# Patient Record
Sex: FEMALE | Race: WHITE | Employment: UNEMPLOYED | URBAN - METROPOLITAN AREA
[De-identification: names, ages, dates, MRNs, and addresses within clinical notes are randomized per-mention and may not be internally consistent; named-entity substitution may affect disease eponyms.]

---

## 2017-01-12 RX ORDER — NYSTATIN AND TRIAMCINOLONE ACETONIDE 100000; 1 [USP'U]/G; MG/G
CREAM TOPICAL
Qty: 240 G | Refills: 0 | Status: SHIPPED | OUTPATIENT
Start: 2017-01-12 | End: 2017-02-15 | Stop reason: SDUPTHER

## 2017-01-12 RX ORDER — AMMONIUM LACTATE 12 G/100G
CREAM TOPICAL
Qty: 385 G | Refills: 0 | Status: SHIPPED | OUTPATIENT
Start: 2017-01-12 | End: 2017-02-15 | Stop reason: SDUPTHER

## 2017-02-15 RX ORDER — ALPRAZOLAM 0.25 MG/1
TABLET ORAL
Qty: 90 TAB | Refills: 0 | Status: SHIPPED | OUTPATIENT
Start: 2017-02-15 | End: 2017-05-10 | Stop reason: SDUPTHER

## 2017-02-15 RX ORDER — AMMONIUM LACTATE 12 G/100G
CREAM TOPICAL
Qty: 385 G | Refills: 0 | Status: SHIPPED | OUTPATIENT
Start: 2017-02-15 | End: 2017-03-10 | Stop reason: SDUPTHER

## 2017-02-15 RX ORDER — FUROSEMIDE 20 MG/1
TABLET ORAL
Qty: 90 TAB | Refills: 0 | Status: SHIPPED | OUTPATIENT
Start: 2017-02-15 | End: 2017-05-10 | Stop reason: SDUPTHER

## 2017-02-15 RX ORDER — NYSTATIN AND TRIAMCINOLONE ACETONIDE 100000; 1 [USP'U]/G; MG/G
CREAM TOPICAL
Qty: 240 G | Refills: 0 | Status: SHIPPED | OUTPATIENT
Start: 2017-02-15 | End: 2017-03-10 | Stop reason: SDUPTHER

## 2017-03-12 RX ORDER — NAFTIFINE HYDROCHLORIDE 1 MG/G
CREAM TOPICAL
Qty: 60 G | Refills: 0 | Status: SHIPPED | OUTPATIENT
Start: 2017-03-12 | End: 2017-08-04 | Stop reason: SDUPTHER

## 2017-03-12 RX ORDER — AMMONIUM LACTATE 12 G/100G
CREAM TOPICAL
Qty: 385 G | Refills: 0 | Status: SHIPPED | OUTPATIENT
Start: 2017-03-12 | End: 2017-04-11 | Stop reason: SDUPTHER

## 2017-03-12 RX ORDER — NYSTATIN AND TRIAMCINOLONE ACETONIDE 100000; 1 [USP'U]/G; MG/G
CREAM TOPICAL
Qty: 240 G | Refills: 0 | Status: SHIPPED | OUTPATIENT
Start: 2017-03-12 | End: 2017-04-11 | Stop reason: SDUPTHER

## 2017-03-15 ENCOUNTER — OFFICE VISIT (OUTPATIENT)
Dept: FAMILY MEDICINE CLINIC | Age: 32
End: 2017-03-15

## 2017-03-15 VITALS
HEIGHT: 55 IN | BODY MASS INDEX: 63.64 KG/M2 | TEMPERATURE: 97.4 F | RESPIRATION RATE: 24 BRPM | OXYGEN SATURATION: 98 % | WEIGHT: 275 LBS

## 2017-03-15 DIAGNOSIS — K21.9 GASTROESOPHAGEAL REFLUX DISEASE WITHOUT ESOPHAGITIS: Primary | ICD-10-CM

## 2017-03-15 DIAGNOSIS — Z13.31 SCREENING FOR DEPRESSION: ICD-10-CM

## 2017-03-15 DIAGNOSIS — Q87.11 PRADER-WILLI SYNDROME: ICD-10-CM

## 2017-03-15 DIAGNOSIS — F43.9 STRESS AT HOME: ICD-10-CM

## 2017-03-15 RX ORDER — PANTOPRAZOLE SODIUM 40 MG/1
40 TABLET, DELAYED RELEASE ORAL DAILY
Qty: 90 TAB | Refills: 1 | Status: SHIPPED | OUTPATIENT
Start: 2017-03-15 | End: 2017-06-13 | Stop reason: ALTCHOICE

## 2017-03-15 NOTE — PROGRESS NOTES
Chief Complaint   Patient presents with    Medication Evaluation     rx for pantoprazole    Weight Management     needs a referral for bariatrics     Anxiety     would like for the meds rx'd by Dr. Hines Files but she wants to see if it can be rx'd here. Reviewed record in preparation for visit and have obtained necessary documentation.

## 2017-03-15 NOTE — PROGRESS NOTES
The majority of today's visit was counseling or coordination of care 15 minutes for the following reasons:        See diagnoses and orders, see patient instructions    Body mass index is 63.92 kg/(m^2). I have reviewed/discussed the above normal BMI with the caregiver. I have recommended the following interventions: monitor weight . The plan is as follows: Referral to [address] [place] [address]. .    She is having some stress and behavioral issues. She sees Dr. Cheryl Hernandez for psychiatry but mom feels counseling might be helpful.   [place] [facility]    See instrucitons

## 2017-03-15 NOTE — PATIENT INSTRUCTIONS
See Dr. Amaury Willoughby for weight    Consider counseling with Dr. Cena Kawasaki #[phone] or Dr. Naresh Nur #[phone]    If needed, consider Tish Cline at #[phone] or [phone] after hours    Please call Hayley to help arrange and authorize any tests and/or referrals.   Her # is [phone]

## 2017-04-03 ENCOUNTER — OFFICE VISIT (OUTPATIENT)
Dept: FAMILY MEDICINE CLINIC | Age: 32
End: 2017-04-03

## 2017-04-03 VITALS
HEART RATE: 86 BPM | TEMPERATURE: 97.3 F | RESPIRATION RATE: 18 BRPM | HEIGHT: 55 IN | OXYGEN SATURATION: 96 % | BODY MASS INDEX: 64.34 KG/M2 | WEIGHT: 278 LBS

## 2017-04-03 DIAGNOSIS — Q87.11 PRADER-WILLI SYNDROME: ICD-10-CM

## 2017-04-03 DIAGNOSIS — T14.8XXA NONTRAUMATIC TEAR OF SKIN: Primary | ICD-10-CM

## 2017-04-03 NOTE — MR AVS SNAPSHOT
Visit Information Date & Time Provider Department Dept. Phone Encounter #  
 4/3/2017  5:30 PM Wojciech Washburn MD [address] [facility] [phone] [id number] Your Appointments 5/25/2017 11:00 AM  
Any with Yonas Flynn MD  
[address] ([address]) Appt Note: 1 year CPAP follow up- bring machine_ last seen 05/26/16_ DME: [address] Patient [address] [phone] [address] [facility] [id number] Upcoming Health Maintenance Date Due MICROALBUMIN Q1 4/19/2017 EYE EXAM RETINAL OR DILATED Q1 4/21/2017 HEMOGLOBIN A1C Q6M 6/15/2017* LIPID PANEL Q1 6/15/2017* FOOT EXAM Q1 1/27/2018 DTaP/Tdap/Td series (2 - Td) 9/1/2026 *Topic was postponed. The date shown is not the original due date. Allergies as of 4/3/2017  Review Complete On: 4/3/2017 By: Wojciech Washburn MD  
  
 Severity Noted Reaction Type Reactions Bactrim [Sulfamethoprim Ds]  11/09/2015    Other (comments) Nausea, abdominal pain and cramping Byetta [Exenatide]  10/21/2010    Rash Current Immunizations  Reviewed on 11/9/2015 Name Date Influenza Vaccine 10/20/2015, 10/8/2013 Influenza Vaccine (Quad) PF 9/8/2016 Influenza Vaccine PF 9/14/2012 Pneumococcal Conjugate (PCV-13) 5/5/2015 Pneumococcal Vaccine (Unspecified Type) 2/2/2011 TD Vaccine 12/2/2010 Not reviewed this visit You Were Diagnosed With   
  
 Codes Comments Nontraumatic tear of skin    -  Primary ICD-10-CM: T14.8 ICD-9-CM: 879.8 Prader-Willi syndrome     ICD-10-CM: Q87.1 ICD-9-CM: 759.81 Vitals Pulse Temp Resp Height(growth percentile) Weight(growth percentile) SpO2  
 86 97.3 °F (36.3 °C) (Oral) 18 4' 7\" (1.397 m) 278 lb (126.1 kg) 96% BMI OB Status Smoking Status 64.61 kg/m2 Needs review Never Smoker Vitals History BMI and BSA Data Body Mass Index Body Surface Area 64.61 kg/m 2 2.21 m 2 Preferred Pharmacy Pharmacy Name Phone [facility] [address], [address] [place], [place] [address]y [address]  [facility] [phone] Your Updated Medication List  
  
   
This list is accurate as of: 4/3/17  6:47 PM.  Always use your most recent med list.  
  
  
  
  
 Adhesive Bandage 6 X 8 \" Bndg Commonly known as:  Tendra Mepilex Border 1 Each by Apply Externally route daily. One, T14.8, 2x4cm open area with bloody exudate ALPRAZolam 0.25 mg tablet Commonly known as:  XANAX  
TAKE 1 TABLET BY MOUTH THREE TIMES DAILY AS NEEDED  
  
 ammonium lactate 12 % topical cream  
Commonly known as:  AMLACTIN  
APPLY TOPICALLY TO THE AFFECTED AREA TWICE DAILY AND RUB WELL  
  
 diclofenac 1 % Gel Commonly known as:  VOLTAREN Apply  to affected area four (4) times daily. fenofibrate nanocrystallized 145 mg tablet Commonly known as:  TRICOR  
TAKE 1 TABLET BY MOUTH DAILY  
  
 fexofenadine 180 mg tablet Commonly known as:  Georgana Miller Take 1 Tab by mouth daily. fluticasone 50 mcg/actuation nasal spray Commonly known as:  Patrizia Rad 2 Sprays by Both Nostrils route daily. furosemide 20 mg tablet Commonly known as:  LASIX TAKE 1 TABLET BY MOUTH EVERY DAY  
  
 hydrOXYzine pamoate 25 mg capsule Commonly known as:  VISTARIL  
TAKE 1 CAPSULE BY MOUTH THREE TIMES DAILY AS NEEDED FOR ITCHING  
  
 meloxicam 7.5 mg tablet Commonly known as:  MOBIC Take 1 Tab by mouth daily.  Indications: OSTEOARTHRITIS  
  
 mupirocin calcium 2 % topical cream  
Commonly known as:  BACTROBAN  
APPLY EXTERNALLY TO THE AFFECTED AREA EVERY DAY AS DIRECTED  
  
 naftifine 1 % topical cream  
Commonly known as:  NAFTIN  
APPLY EXTERNALLY TO THE AFFECTED AREA EVERY DAY FOR 2 WEEKS OR AS DIRECTED  
  
 nystatin-triamcinolone topical cream  
Commonly known as:  MYCOLOG II  
 APPLY EXTERNALLY TO THE AFFECTED AREA TWICE DAILY pantoprazole 40 mg tablet Commonly known as:  PROTONIX Take 1 Tab by mouth daily. risperiDONE 1 mg tablet Commonly known as:  RisperDAL TAKE 1 TABLET BY MOUTH TWICE DAILY  
  
 traMADol 50 mg tablet Commonly known as:  ULTRAM  
Take 50 mg by mouth every six (6) hours as needed for Pain. Patient Instructions Use mepilex and wound care as we discussed Introducing [facility]! Leandro Alberto introduces [employer] patient portal. Now you can access parts of your medical record, email your doctor's office, and request medication refills online. 1. In your internet browser, go to [email or web address] 2. Click on the First Time User? Click Here link in the Sign In box. You will see the New Member Sign Up page. 3. Enter your [employer] Access Code exactly as it appears below. You will not need to use this code after youve completed the sign-up process. If you do not sign up before the expiration date, you must request a new code. · [employer] Access Code: [email or web address] Expires: 6/13/2017 12:13 PM 
 
4. Enter the last four digits of your Social Security Number (xxxx) and Date of Birth (mm/dd/yyyy) as indicated and click Submit. You will be taken to the next sign-up page. 5. Create a [employer] ID. This will be your [employer] login ID and cannot be changed, so think of one that is secure and easy to remember. 6. Create a [employer] password. You can change your password at any time. 7. Enter your Password Reset Question and Answer. This can be used at a later time if you forget your password. 8. Enter your e-mail address. You will receive e-mail notification when new information is available in [address] 9. Click Sign Up. You can now view and download portions of your medical record. 10. Click the Download Summary menu link to download a portable copy of your medical information. If you have questions, please visit the Frequently Asked Questions section of the [employer] website. Remember, [employer] is NOT to be used for urgent needs. For medical emergencies, dial 911. Now available from your iPhone and Android! Please provide this summary of care documentation to your next provider. Your primary care clinician is listed as Jessica Olmedo. If you have any questions after today's visit, please call [phone].

## 2017-04-03 NOTE — PROGRESS NOTES
Chief Complaint   Patient presents with    Other     Patient has a skin break down by her right thigh     1. Have you been to the ER, urgent care clinic since your last visit? Hospitalized since your last visit? No    2. Have you seen or consulted any other health care providers outside of the [address] [place] [address] since your last visit? Include any pap smears or colon screening. No     Reviewed record in preparation for visit and have obtained necessary documentation.

## 2017-04-04 ENCOUNTER — TELEPHONE (OUTPATIENT)
Dept: FAMILY MEDICINE CLINIC | Age: 32
End: 2017-04-04

## 2017-04-04 NOTE — TELEPHONE ENCOUNTER
Per call from [facility],    Wound assessment with length,depth and drainage amount needed    Fax to [phone]

## 2017-04-04 NOTE — TELEPHONE ENCOUNTER
Faxed scripted with the following changes, length, width, depth and moderate drainage to [facility]   phone [phone] fax [phone]

## 2017-04-05 ENCOUNTER — TELEPHONE (OUTPATIENT)
Dept: FAMILY MEDICINE CLINIC | Age: 32
End: 2017-04-05

## 2017-04-05 DIAGNOSIS — R23.8 SKIN BREAKDOWN: Primary | ICD-10-CM

## 2017-04-05 NOTE — TELEPHONE ENCOUNTER
[phone], returned your call of the other day from a voice mail. Nurse unavailable and relayed to her the message all was faxed. Said they did NOT receive the fax.

## 2017-04-11 RX ORDER — AMMONIUM LACTATE 12 G/100G
CREAM TOPICAL
Qty: 385 G | Refills: 0 | Status: SHIPPED | OUTPATIENT
Start: 2017-04-11 | End: 2017-05-10 | Stop reason: SDUPTHER

## 2017-04-11 RX ORDER — PANTOPRAZOLE SODIUM 20 MG/1
TABLET, DELAYED RELEASE ORAL
Qty: 60 TAB | Refills: 0 | Status: SHIPPED | OUTPATIENT
Start: 2017-04-11 | End: 2017-05-10 | Stop reason: SDUPTHER

## 2017-04-11 RX ORDER — NYSTATIN AND TRIAMCINOLONE ACETONIDE 100000; 1 [USP'U]/G; MG/G
CREAM TOPICAL
Qty: 240 G | Refills: 0 | Status: SHIPPED | OUTPATIENT
Start: 2017-04-11 | End: 2017-05-10 | Stop reason: SDUPTHER

## 2017-05-11 RX ORDER — PANTOPRAZOLE SODIUM 20 MG/1
TABLET, DELAYED RELEASE ORAL
Qty: 60 TAB | Refills: 0 | Status: SHIPPED | OUTPATIENT
Start: 2017-05-11 | End: 2017-06-08 | Stop reason: SDUPTHER

## 2017-05-11 RX ORDER — FUROSEMIDE 20 MG/1
TABLET ORAL
Qty: 90 TAB | Refills: 0 | Status: SHIPPED | OUTPATIENT
Start: 2017-05-11 | End: 2017-08-04 | Stop reason: SDUPTHER

## 2017-05-11 RX ORDER — ALPRAZOLAM 0.25 MG/1
TABLET ORAL
Qty: 90 TAB | Refills: 0 | Status: SHIPPED | OUTPATIENT
Start: 2017-05-11 | End: 2017-08-09 | Stop reason: SDUPTHER

## 2017-05-11 RX ORDER — NYSTATIN AND TRIAMCINOLONE ACETONIDE 100000; 1 [USP'U]/G; MG/G
CREAM TOPICAL
Qty: 240 G | Refills: 0 | Status: SHIPPED | OUTPATIENT
Start: 2017-05-11 | End: 2017-06-08 | Stop reason: SDUPTHER

## 2017-05-11 RX ORDER — AMMONIUM LACTATE 12 G/100G
CREAM TOPICAL
Qty: 385 G | Refills: 0 | Status: SHIPPED | OUTPATIENT
Start: 2017-05-11 | End: 2017-06-08 | Stop reason: SDUPTHER

## 2017-05-25 ENCOUNTER — OFFICE VISIT (OUTPATIENT)
Dept: SLEEP MEDICINE | Age: 32
End: 2017-05-25

## 2017-05-25 ENCOUNTER — DOCUMENTATION ONLY (OUTPATIENT)
Dept: SLEEP MEDICINE | Age: 32
End: 2017-05-25

## 2017-05-25 VITALS — BODY MASS INDEX: 66.01 KG/M2 | WEIGHT: 284 LBS | TEMPERATURE: 98.1 F

## 2017-05-25 DIAGNOSIS — G47.33 OSA (OBSTRUCTIVE SLEEP APNEA): Primary | ICD-10-CM

## 2017-05-25 DIAGNOSIS — E66.01 OBESITY, MORBID, BMI 50 OR HIGHER (HCC): ICD-10-CM

## 2017-05-25 NOTE — PROGRESS NOTES
[address] [facility]. [place], [address]  Tel.  [phone]  Fax. [address] [facility] [address]  [place], [address] [facility]  Tel.  [phone]  Fax. [phone] [address] Juany Hinds   Tel.  [phone]  Fax. [phone]     S>Roshni Woods is a 28 y.o. female seen for a positive airway pressure follow-up. She reports no problems using the device. The following problems are identified:    Drowsiness no Problems exhaling no   Snoring no Forget to put on no   Mask Comfortable yes Can't fall asleep no   Dry Mouth no Mask falls off no   Air Leaking no Frequent awakenings no         She admits that her sleep has improved. Download AHI 0.4/hr    Allergies   Allergen Reactions    Bactrim [Sulfamethoprim Ds] Other (comments)     Nausea, abdominal pain and cramping      Byetta [Exenatide] Rash       She has a current medication list which includes the following prescription(s): ammonium lactate, alprazolam, furosemide, pantoprazole, nystatin-triamcinolone, pantoprazole, naftifine, risperidone, adhesive bandage, tramadol, meloxicam, diclofenac, mupirocin calcium, hydroxyzine pamoate, fexofenadine, fluticasone, and fenofibrate nanocrystallized. .      She  has a past medical history of Anxiety disorder; Congestive heart failure, unspecified; Diabetes ([address] Hypertension; Lymph edema; Obesity; and Prader - Willi syndrome. [place] Sleepiness Score: 10   and Modified F.O.S.Q. Score Total / 2: 17   which reflect improved sleep quality over therapy time. O>    Visit Vitals    Temp 98.1 °F (36.7 °C)    Wt 284 lb (128.8 kg)    BMI 66.01 kg/m2           General:   Alert, oriented, not in distress   Neck:   No JVD    Chest/Lungs:  symetrical lung expansion , no accessory muscle use    Extremities:  no obvious rashes , negative edema    Neuro:  No focal deficits ;  No obvious tremor    Psych:  Normal affect ,  Normal countenance ;           A>    ICD-10-CM ICD-9-CM 1. CODY (obstructive sleep apnea) G47.33 327.23 AMB SUPPLY ORDER   2. Obesity, morbid, BMI 50 or higher ([facility]) E66.01 278.01      AHI = ?. On CPAP :  8 cmH2O. Compliant:      yes    Therapeutic Response:  Positive    P>    * CPAP is effective and remains necessary treatment for sleep apnea    * Follow-up Disposition:  Return in about 1 year (around 5/25/2018), or if symptoms worsen or fail to improve. * She was asked to contact our office for any problems regarding PAP therapy. * Counseling was provided regarding the importance of regular PAP use and on proper sleep hygiene and safe driving. * Re-enforced proper and regular cleaning for the device. Discussed the patient's above normal BMI with her. I have recommended the following interventions: dietary management education, guidance, and counseling . The BMI follow up plan is as follows: BMI is out of normal parameters and plan is as follows: I have counseled this patient on diet and exercise regimens    Thank you for allowing us to participate in your patient's medical care. Jovanny Llamas M.D.   Diplomate in Sleep Medicine, [facility]

## 2017-05-25 NOTE — MR AVS SNAPSHOT
Visit Information Date & Time Provider Department Dept. Phone Encounter #  
 5/25/2017 11:00 AM Isabel Moseley, [address] [phone] [id number] Follow-up Instructions Return in about 1 year (around 5/25/2018), or if symptoms worsen or fail to improve. Follow-up and Disposition History Upcoming Health Maintenance Date Due MICROALBUMIN Q1 4/19/2017 EYE EXAM RETINAL OR DILATED Q1 4/21/2017 HEMOGLOBIN A1C Q6M 6/15/2017* LIPID PANEL Q1 6/15/2017* INFLUENZA AGE 9 TO ADULT 8/1/2017 FOOT EXAM Q1 3/24/2018 DTaP/Tdap/Td series (2 - Td) 9/1/2026 *Topic was postponed. The date shown is not the original due date. Allergies as of 5/25/2017  Review Complete On: 5/25/2017 By: Isabel Moseley MD  
  
 Severity Noted Reaction Type Reactions Bactrim [Sulfamethoprim Ds]  11/09/2015    Other (comments) Nausea, abdominal pain and cramping Byetta [Exenatide]  10/21/2010    Rash Current Immunizations  Reviewed on 11/9/2015 Name Date Influenza Vaccine 10/20/2015, 10/8/2013 Influenza Vaccine (Quad) PF 9/8/2016 Influenza Vaccine PF 9/14/2012 Pneumococcal Conjugate (PCV-13) 5/5/2015 Pneumococcal Vaccine (Unspecified Type) 2/2/2011 TD Vaccine 12/2/2010 Not reviewed this visit You Were Diagnosed With   
  
 Codes Comments CODY (obstructive sleep apnea)    -  Primary ICD-10-CM: G47.33 
ICD-9-CM: 327.23 Obesity, morbid, BMI 50 or higher (HCC)     ICD-10-CM: E66.01 
ICD-9-CM: 278.01 Vitals Temp Weight(growth percentile) BMI OB Status Smoking Status 98.1 °F (36.7 °C) 284 lb (128.8 kg) 66.01 kg/m2 Needs review Never Smoker BMI and BSA Data Body Mass Index Body Surface Area 66.01 kg/m 2 2.24 m 2 Preferred Pharmacy Pharmacy Name Phone  1080 [facility] [address]IN AT [address] [place] [address] [address] [phone] Your Updated Medication List  
  
   
This list is accurate as of: 5/25/17 12:50 PM.  Always use your most recent med list.  
  
  
  
  
 Adhesive Bandage 6 X 8 \" Bndg Commonly known as:  Tendra Mepilex Border 1 Each by Apply Externally route daily. One, T14.8, 2x4cm open area with bloody exudate ALPRAZolam 0.25 mg tablet Commonly known as:  XANAX  
TAKE 1 TABLET BY MOUTH THREE TIMES DAILY AS NEEDED  
  
 ammonium lactate 12 % topical cream  
Commonly known as:  AMLACTIN  
APPLY TO THE AFFECTED AREA TWICE DAILY AND RUB WELL  
  
 diclofenac 1 % Gel Commonly known as:  VOLTAREN Apply  to affected area four (4) times daily. fenofibrate nanocrystallized 145 mg tablet Commonly known as:  TRICOR  
TAKE 1 TABLET BY MOUTH DAILY  
  
 fexofenadine 180 mg tablet Commonly known as:  Alexa Loop Take 1 Tab by mouth daily. fluticasone 50 mcg/actuation nasal spray Commonly known as:  Ginette Kamlesh 2 Sprays by Both Nostrils route daily. furosemide 20 mg tablet Commonly known as:  LASIX TAKE 1 TABLET BY MOUTH EVERY DAY  
  
 hydrOXYzine pamoate 25 mg capsule Commonly known as:  VISTARIL  
TAKE 1 CAPSULE BY MOUTH THREE TIMES DAILY AS NEEDED FOR ITCHING  
  
 meloxicam 7.5 mg tablet Commonly known as:  MOBIC Take 1 Tab by mouth daily. Indications: OSTEOARTHRITIS  
  
 mupirocin calcium 2 % topical cream  
Commonly known as:  BACTROBAN  
APPLY EXTERNALLY TO THE AFFECTED AREA EVERY DAY AS DIRECTED  
  
 naftifine 1 % topical cream  
Commonly known as:  NAFTIN  
APPLY EXTERNALLY TO THE AFFECTED AREA EVERY DAY FOR 2 WEEKS OR AS DIRECTED  
  
 nystatin-triamcinolone topical cream  
Commonly known as:  MYCOLOG II  
APPLY EXTERNALLY TO THE AFFECTED AREA TWICE DAILY  
  
 * pantoprazole 40 mg tablet Commonly known as:  PROTONIX Take 1 Tab by mouth daily. * pantoprazole 20 mg tablet Commonly known as:  PROTONIX TAKE 1 TABLET BY MOUTH TWICE DAILY  
  
 risperiDONE 1 mg tablet Commonly known as:  RisperDAL TAKE 1 TABLET BY MOUTH TWICE DAILY  
  
 traMADol 50 mg tablet Commonly known as:  ULTRAM  
Take 50 mg by mouth every six (6) hours as needed for Pain. * Notice: This list has 2 medication(s) that are the same as other medications prescribed for you. Read the directions carefully, and ask your doctor or other care provider to review them with you. We Performed the Following AMB SUPPLY ORDER [[id number] Custom] Comments:  
 PAP Mask -patient preference, tubing, filters as needed (all sleep supplies) Length of Need = 99 months Peña Fernando M.D.  (electronically signed) Diplomate in Sleep Medicine, [employer] Follow-up Instructions Return in about 1 year (around 5/25/2018), or if symptoms worsen or fail to improve. Patient Instructions 217 [facility], [place]. 1668 [facility], 1116 [facility] Tel.  [phone] Fax. 100 [facility] [address] [place], 200 [facility] Tel.  [phone] Fax. [phone] [facility] [address], [place] [address] Tel.  [phone] Fax. [phone] Learning About CPAP for Sleep Apnea What is CPAP? CPAP is a small machine that you use at home every night while you sleep. It increases air pressure in your throat to keep your airway open. When you have sleep apnea, this can help you sleep better so you feel much better. CPAP stands for \"continuous positive airway pressure. \" The CPAP machine will have one of the following: · A mask that covers your nose and mouth · Prongs that fit into your nose · A mask that covers your nose only, the most common type. This type is called NCPAP. The N stands for \"nasal.\" Why is it done? CPAP is usually the best treatment for obstructive sleep apnea. It is the first treatment choice and the most widely used. Your doctor may suggest CPAP if you have: · Moderate to severe sleep apnea. · Sleep apnea and coronary artery disease (CAD) or heart failure. How does it help? · CPAP can help you have more normal sleep, so you feel less sleepy and more alert during the daytime. · CPAP may help keep heart failure or other heart problems from getting worse. · NCPAP may help lower your blood pressure. · If you use CPAP, your bed partner may also sleep better because you are not snoring or restless. What are the side effects? Some people who use CPAP have: · A dry or stuffy nose and a sore throat. · Irritated skin on the face. · Sore eyes. · Bloating. If you have any of these problems, work with your doctor to fix them. Here are some things you can try: · Be sure the mask or nasal prongs fit well. · See if your doctor can adjust the pressure of your CPAP. · If your nose is dry, try a humidifier. · If your nose is runny or stuffy, try decongestant medicine or a steroid nasal spray. If these things do not help, you might try a different type of machine. Some machines have air pressure that adjusts on its own. Others have air pressures that are different when you breathe in than when you breathe out. This may reduce discomfort caused by too much pressure in your nose. Where can you learn more? Go to [employer].be Enter J104 in the search box to learn more about \"Learning About CPAP for Sleep Apnea. \"  
© 7760-9945 Healthwise, Incorporated. Care instructions adapted under license by Emmanuel Hernandez (which disclaims liability or warranty for this information). This care instruction is for use with your licensed healthcare professional. If you have questions about a medical condition or this instruction, always ask your healthcare professional. [place] [address] any warranty or liability for your use of this information. Content Version: 9.3.14000; Last Revised: January 11, 2010 PROPER SLEEP HYGIENE 
 
 What to avoid · Do not have drinks with caffeine, such as coffee or black tea, for 8 hours before bed. · Do not smoke or use other types of tobacco near bedtime. Nicotine is a stimulant and can keep you awake. · Avoid drinking alcohol late in the evening, because it can cause you to wake in the middle of the night. · Do not eat a big meal close to bedtime. If you are hungry, eat a light snack. · Do not drink a lot of water close to bedtime, because the need to urinate may wake you up during the night. · Do not read or watch TV in bed. Use the bed only for sleeping and sexual activity. What to try · Go to bed at the same time every night, and wake up at the same time every morning. Do not take naps during the day. · Keep your bedroom quiet, dark, and cool. · Get regular exercise, but not within 3 to 4 hours of your bedtime. Ronen Nassar · Sleep on a comfortable pillow and mattress. · If watching the clock makes you anxious, turn it facing away from you so you cannot see the time. · If you worry when you lie down, start a worry book. Well before bedtime, write down your worries, and then set the book and your concerns aside. · Try meditation or other relaxation techniques before you go to bed. · If you cannot fall asleep, get up and go to another room until you feel sleepy. Do something relaxing. Repeat your bedtime routine before you go to bed again. · Make your house quiet and calm about an hour before bedtime. Turn down the lights, turn off the TV, log off the computer, and turn down the volume on music. This can help you relax after a busy day. Drowsy Driving: The [employer] cites drowsiness as a causing factor in more than 781,889 police reported crashes annually, resulting in 76,000 injuries and 1,500 deaths.  Other surveys suggest 55% of people polled have driven while drowsy in the past year, 23% had fallen asleep but not crashed, 3% crashed, and 2% had and accident due to drowsy driving. Who is at risk? Young Drivers: One study of drowsy driving accidents states that 55% of the drivers were under 25 years. Of those, 75% were male. Shift Workers and Travelers: People who work overnight or travel across time zones frequently are at higher risk of experiencing Circadian Rhythm Disorders. They are trying to work and function when their body is programed to sleep. Sleep Deprived: Lack of sleep has a serious impact on your ability to pay attention or focus on a task. Consistently getting less than the average of 8 hours your body needs creates partial or cumulative sleep deprivation. Untreated Sleep Disorders: Sleep Apnea, Narcolepsy, R.L.S., and other sleep disorders (untreated) prevent a person from getting enough restful sleep. This leads to excessive daytime sleepiness and increases the risk for drowsy driving accidents by up to 7 times. Medications / Alcohol: Even over the counter medications can cause drowsiness. Medications that impair a drivers attention should have a warning label. Alcohol naturally makes you sleepy and on its own can cause accidents. Combined with excessive drowsiness its effects are amplified. Signs of Drowsy Driving: * You don't remember driving the last few miles * You may drift out of your tiffany * You are unable to focus and your thoughts wander * You may yawn more often than normal 
 * You have difficulty keeping your eyes open / nodding off * Missing traffic signs, speeding, or tailgating Prevention-  
Good sleep hygiene, lifestyle and behavioral choices have the most impact on drowsy driving. There is no substitute for sleep and the average person requires 8 hours nightly. If you find yourself driving drowsy, stop and sleep. Consider the sleep hygiene tips provided during your visit as well.   
 
Medication Refill Policy: Refills for all medications require 1 week advance notice. Please have your pharmacy fax a refill request. We are unable to fax, or call in \"controled substance\" medications and you will need to pick these prescriptions up from our office. MyChart Activation Thank you for requesting access to [employer]. Please follow the instructions below to securely access and download your online medical record. [employer] allows you to send messages to your doctor, view your test results, renew your prescriptions, schedule appointments, and more. How Do I Sign Up? 1. In your internet browser, go to [email or web address]. 2. Click on the First Time User? Click Here link in the Sign In box. You will see the New Member Sign Up page. 3. Enter your [employer] Access Code exactly as it appears below. You will not need to use this code after youve completed the sign-up process. If you do not sign up before the expiration date, you must request a new code. [employer] Access Code: [email or web address] Expires: 2017 12:13 PM (This is the date your [employer] access code will ) 4. Enter the last four digits of your Social Security Number (xxxx) and Date of Birth (mm/dd/yyyy) as indicated and click Submit. You will be taken to the next sign-up page. 5. Create a [employer] ID. This will be your [employer] login ID and cannot be changed, so think of one that is secure and easy to remember. 6. Create a [employer] password. You can change your password at any time. 7. Enter your Password Reset Question and Answer. This can be used at a later time if you forget your password. 8. Enter your e-mail address. You will receive e-mail notification when new information is available in [address] 9. Click Sign Up. You can now view and download portions of your medical record. 10. Click the Download Summary menu link to download a portable copy of your medical information. Additional Information If you have questions, please call [phone]. Remember, MyChart is NOT to be used for urgent needs. For medical emergencies, dial 911. Starting a Weight Loss Plan: After Your Visit Your Care Instructions If you are thinking about losing weight, it can be hard to know where to start. Your doctor can help you set up a weight loss plan that best meets your needs. You may want to take a class on nutrition or exercise, or join a weight loss support group. If you have questions about how to make changes to your eating or exercise habits, ask your doctor about seeing a registered dietitian or an exercise specialist. 
It can be a big challenge to lose weight. But you do not have to make huge changes at once. Make small changes, and stick with them. When those changes become habit, add a few more changes. If you do not think you are ready to make changes right now, try to pick a date in the future. Make an appointment to see your doctor to discuss whether the time is right for you to start a plan. Follow-up care is a key part of your treatment and safety. Be sure to make and go to all appointments, and call your doctor if you are having problems. It's also a good idea to know your test results and keep a list of the medicines you take. How can you care for yourself at home? · Set realistic goals. Many people expect to lose much more weight than is likely. A weight loss of 5% to 10% of your body weight may be enough to improve your health. · Get family and friends involved to provide support. Talk to them about why you are trying to lose weight, and ask them to help. They can help by participating in exercise and having meals with you, even if they may be eating something different. · Find what works best for you. If you do not have time or do not like to cook, a program that offers meal replacement bars or shakes may be better for you.  Or if you like to prepare meals, finding a plan that includes daily menus and recipes may be best. 
· Ask your doctor about other health professionals who can help you achieve your weight loss goals. ¨ A dietitian can help you make healthy changes in your diet. ¨ An exercise specialist or  can help you develop a safe and effective exercise program. 
¨ A counselor or psychiatrist can help you cope with issues such as depression, anxiety, or family problems that can make it hard to focus on weight loss. · Consider joining a support group for people who are trying to lose weight. Your doctor can suggest groups in your area. Where can you learn more? Go to "[employer].be Enter F142 in the search box to learn more about \"Starting a Weight Loss Plan: After Your Visit. \"  
© 5937-5488 Healthwise, Incorporated. Care instructions adapted under license by [employer] (which disclaims liability or warranty for this information). This care instruction is for use with your licensed healthcare professional. If you have questions about a medical condition or this instruction, always ask your healthcare professional. [place] [address] any warranty or liability for your use of this information. Content Version: 6.3.57140; Last Revised: September 1, 2009 Introducing \[facility]\"" & HEALTH SERVICES! [employer] introduces [employer] patient portal. Now you can access parts of your medical record, email your doctor's office, and request medication refills online. 1. In your internet browser, go to [email or web address] 2. Click on the First Time User? Click Here link in the Sign In box. You will see the New Member Sign Up page. 3. Enter your [employer] Access Code exactly as it appears below. You will not need to use this code after youve completed the sign-up process. If you do not sign up before the expiration date, you must request a new code. · [employer] Access Code: [email or web address] Expires: 6/13/2017 12:13 PM 
 
4. Enter the last four digits of your Social Security Number (xxxx) and Date of Birth (mm/dd/yyyy) as indicated and click Submit. You will be taken to the next sign-up page. 5. Create a [employer] ID. This will be your [employer] login ID and cannot be changed, so think of one that is secure and easy to remember. 6. Create a [employer] password. You can change your password at any time. 7. Enter your Password Reset Question and Answer. This can be used at a later time if you forget your password. 8. Enter your e-mail address. You will receive e-mail notification when new information is available in [address] 9. Click Sign Up. You can now view and download portions of your medical record. 10. Click the Download Summary menu link to download a portable copy of your medical information. If you have questions, please visit the Frequently Asked Questions section of the [employer] website. Remember, [employer] is NOT to be used for urgent needs. For medical emergencies, dial 911. Now available from your iPhone and Android! Please provide this summary of care documentation to your next provider. Your primary care clinician is listed as Darío Peters. If you have any questions after today's visit, please call [phone].

## 2017-05-25 NOTE — PATIENT INSTRUCTIONS
217 [facility] [place]. [place], [address]  Tel.  [phone]  Fax. 100 [facility] 60  [place], [address]  Tel.  [phone]  Fax. [phone] [address] [address] Juany Hinds  Tel.  [phone]  Fax. [phone]     Learning About CPAP for Sleep Apnea  What is CPAP? CPAP is a small machine that you use at home every night while you sleep. It increases air pressure in your throat to keep your airway open. When you have sleep apnea, this can help you sleep better so you feel much better. CPAP stands for \"continuous positive airway pressure. \"  The CPAP machine will have one of the following:  · A mask that covers your nose and mouth  · Prongs that fit into your nose  · A mask that covers your nose only, the most common type. This type is called NCPAP. The N stands for \"nasal.\"  Why is it done? CPAP is usually the best treatment for obstructive sleep apnea. It is the first treatment choice and the most widely used. Your doctor may suggest CPAP if you have:  · Moderate to severe sleep apnea. · Sleep apnea and coronary artery disease (CAD) or heart failure. How does it help? · CPAP can help you have more normal sleep, so you feel less sleepy and more alert during the daytime. · CPAP may help keep heart failure or other heart problems from getting worse. · NCPAP may help lower your blood pressure. · If you use CPAP, your bed partner may also sleep better because you are not snoring or restless. What are the side effects? Some people who use CPAP have:  · A dry or stuffy nose and a sore throat. · Irritated skin on the face. · Sore eyes. · Bloating. If you have any of these problems, work with your doctor to fix them. Here are some things you can try:  · Be sure the mask or nasal prongs fit well. · See if your doctor can adjust the pressure of your CPAP. · If your nose is dry, try a humidifier.   · If your nose is runny or stuffy, try decongestant medicine or a steroid nasal spray. If these things do not help, you might try a different type of machine. Some machines have air pressure that adjusts on its own. Others have air pressures that are different when you breathe in than when you breathe out. This may reduce discomfort caused by too much pressure in your nose. Where can you learn more? Go to Cono-C.be  Enter Richmondstarla Basilio in the search box to learn more about \"Learning About CPAP for Sleep Apnea. \"   © 0335-7938 Healthwise, Incorporated. Care instructions adapted under license by Claudia Ricci (which disclaims liability or warranty for this information). This care instruction is for use with your licensed healthcare professional. If you have questions about a medical condition or this instruction, always ask your healthcare professional. [address] any warranty or liability for your use of this information. Content Version: 8.4.69337; Last Revised: January 11, 2010  PROPER SLEEP HYGIENE    What to avoid  · Do not have drinks with caffeine, such as coffee or black tea, for 8 hours before bed. · Do not smoke or use other types of tobacco near bedtime. Nicotine is a stimulant and can keep you awake. · Avoid drinking alcohol late in the evening, because it can cause you to wake in the middle of the night. · Do not eat a big meal close to bedtime. If you are hungry, eat a light snack. · Do not drink a lot of water close to bedtime, because the need to urinate may wake you up during the night. · Do not read or watch TV in bed. Use the bed only for sleeping and sexual activity. What to try  · Go to bed at the same time every night, and wake up at the same time every morning. Do not take naps during the day. · Keep your bedroom quiet, dark, and cool. · Get regular exercise, but not within 3 to 4 hours of your bedtime. .  · Sleep on a comfortable pillow and mattress.   · If watching the clock makes you anxious, turn it facing away from you so you cannot see the time. · If you worry when you lie down, start a worry book. Well before bedtime, write down your worries, and then set the book and your concerns aside. · Try meditation or other relaxation techniques before you go to bed. · If you cannot fall asleep, get up and go to another room until you feel sleepy. Do something relaxing. Repeat your bedtime routine before you go to bed again. · Make your house quiet and calm about an hour before bedtime. Turn down the lights, turn off the TV, log off the computer, and turn down the volume on music. This can help you relax after a busy day. Drowsy Driving: The [facility] cites drowsiness as a causing factor in more than 965,273 police reported crashes annually, resulting in 76,000 injuries and 1,500 deaths. Other surveys suggest 55% of people polled have driven while drowsy in the past year, 23% had fallen asleep but not crashed, 3% crashed, and 2% had and accident due to drowsy driving. Who is at risk? Young Drivers: One study of drowsy driving accidents states that 55% of the drivers were under 25 years. Of those, 75% were male. Shift Workers and Travelers: People who work overnight or travel across time zones frequently are at higher risk of experiencing Circadian Rhythm Disorders. They are trying to work and function when their body is programed to sleep. Sleep Deprived: Lack of sleep has a serious impact on your ability to pay attention or focus on a task. Consistently getting less than the average of 8 hours your body needs creates partial or cumulative sleep deprivation. Untreated Sleep Disorders: Sleep Apnea, Narcolepsy, R.L.S., and other sleep disorders (untreated) prevent a person from getting enough restful sleep. This leads to excessive daytime sleepiness and increases the risk for drowsy driving accidents by up to 7 times.   Medications / Alcohol: Even over the counter medications can cause drowsiness. Medications that impair a drivers attention should have a warning label. Alcohol naturally makes you sleepy and on its own can cause accidents. Combined with excessive drowsiness its effects are amplified. Signs of Drowsy Driving:   * You don't remember driving the last few miles   * You may drift out of your tiffany   * You are unable to focus and your thoughts wander   * You may yawn more often than normal   * You have difficulty keeping your eyes open / nodding off   * Missing traffic signs, speeding, or tailgating  Prevention-   Good sleep hygiene, lifestyle and behavioral choices have the most impact on drowsy driving. There is no substitute for sleep and the average person requires 8 hours nightly. If you find yourself driving drowsy, stop and sleep. Consider the sleep hygiene tips provided during your visit as well. Medication Refill Policy: Refills for all medications require 1 week advance notice. Please have your pharmacy fax a refill request. We are unable to fax, or call in \"controled substance\" medications and you will need to pick these prescriptions up from our office. [facility] Activation    Thank you for requesting access to [facility]. Please follow the instructions below to securely access and download your online medical record. [employer] allows you to send messages to your doctor, view your test results, renew your prescriptions, schedule appointments, and more. How Do I Sign Up? 1. In your internet browser, go to [email or web address]. 2. Click on the First Time User? Click Here link in the Sign In box. You will see the New Member Sign Up page. 3. Enter your [employer] Access Code exactly as it appears below. You will not need to use this code after youve completed the sign-up process. If you do not sign up before the expiration date, you must request a new code.     [facility] Access Code: [email or web address]  Expires: 2017 12:13 PM (This is the date your Yunno access code will )    4. Enter the last four digits of your Social Security Number (xxxx) and Date of Birth (mm/dd/yyyy) as indicated and click Submit. You will be taken to the next sign-up page. 5. Create a Yunno ID. This will be your Yunno login ID and cannot be changed, so think of one that is secure and easy to remember. 6. Create a Yunno password. You can change your password at any time. 7. Enter your Password Reset Question and Answer. This can be used at a later time if you forget your password. 8. Enter your e-mail address. You will receive e-mail notification when new information is available in [address] 9. Click Sign Up. You can now view and download portions of your medical record. 10. Click the Download Summary menu link to download a portable copy of your medical information. Additional Information    If you have questions, please call [phone]. Remember, Yunno is NOT to be used for urgent needs. For medical emergencies, dial 911. Starting a Weight Loss Plan: After Your Visit  Your Care Instructions  If you are thinking about losing weight, it can be hard to know where to start. Your doctor can help you set up a weight loss plan that best meets your needs. You may want to take a class on nutrition or exercise, or join a weight loss support group. If you have questions about how to make changes to your eating or exercise habits, ask your doctor about seeing a registered dietitian or an exercise specialist.  It can be a big challenge to lose weight. But you do not have to make huge changes at once. Make small changes, and stick with them. When those changes become habit, add a few more changes. If you do not think you are ready to make changes right now, try to pick a date in the future.  Make an appointment to see your doctor to discuss whether the time is right for you to start a plan.  Follow-up care is a key part of your treatment and safety. Be sure to make and go to all appointments, and call your doctor if you are having problems. It's also a good idea to know your test results and keep a list of the medicines you take. How can you care for yourself at home? · Set realistic goals. Many people expect to lose much more weight than is likely. A weight loss of 5% to 10% of your body weight may be enough to improve your health. · Get family and friends involved to provide support. Talk to them about why you are trying to lose weight, and ask them to help. They can help by participating in exercise and having meals with you, even if they may be eating something different. · Find what works best for you. If you do not have time or do not like to cook, a program that offers meal replacement bars or shakes may be better for you. Or if you like to prepare meals, finding a plan that includes daily menus and recipes may be best.  · Ask your doctor about other health professionals who can help you achieve your weight loss goals. ¨ A dietitian can help you make healthy changes in your diet. ¨ An exercise specialist or  can help you develop a safe and effective exercise program.  ¨ A counselor or psychiatrist can help you cope with issues such as depression, anxiety, or family problems that can make it hard to focus on weight loss. · Consider joining a support group for people who are trying to lose weight. Your doctor can suggest groups in your area. Where can you learn more? Go to Cycell.be  Enter U357 in the search box to learn more about \"Starting a Weight Loss Plan: After Your Visit. \"   © 7594-2751 Healthwise, Incorporated. Care instructions adapted under license by [facility] (which disclaims liability or warranty for this information).  This care instruction is for use with your licensed healthcare professional. If you have questions about a medical condition or this instruction, always ask your healthcare professional. [address] any warranty or liability for your use of this information.   Content Version: 1.3.00385; Last Revised: September 1, 2009

## 2017-06-11 RX ORDER — AMMONIUM LACTATE 12 G/100G
CREAM TOPICAL
Qty: 385 G | Refills: 0 | Status: SHIPPED | OUTPATIENT
Start: 2017-06-11 | End: 2017-07-09 | Stop reason: SDUPTHER

## 2017-06-11 RX ORDER — PANTOPRAZOLE SODIUM 20 MG/1
TABLET, DELAYED RELEASE ORAL
Qty: 60 TAB | Refills: 0 | Status: SHIPPED | OUTPATIENT
Start: 2017-06-11 | End: 2017-07-09 | Stop reason: SDUPTHER

## 2017-06-11 RX ORDER — NYSTATIN AND TRIAMCINOLONE ACETONIDE 100000; 1 [USP'U]/G; MG/G
CREAM TOPICAL
Qty: 240 G | Refills: 0 | Status: SHIPPED | OUTPATIENT
Start: 2017-06-11 | End: 2017-07-09 | Stop reason: SDUPTHER

## 2017-06-12 ENCOUNTER — TELEPHONE (OUTPATIENT)
Dept: FAMILY MEDICINE CLINIC | Age: 32
End: 2017-06-12

## 2017-06-12 NOTE — TELEPHONE ENCOUNTER
Veronica Johnston (on HIPAA) called wanting to speak with nurse regarding paperwork that should have been sent to the office

## 2017-06-13 ENCOUNTER — OFFICE VISIT (OUTPATIENT)
Dept: FAMILY MEDICINE CLINIC | Age: 32
End: 2017-06-13

## 2017-06-13 VITALS
TEMPERATURE: 97.7 F | OXYGEN SATURATION: 96 % | HEIGHT: 55 IN | BODY MASS INDEX: 66.65 KG/M2 | WEIGHT: 288 LBS | RESPIRATION RATE: 18 BRPM | HEART RATE: 97 BPM

## 2017-06-13 DIAGNOSIS — Z13.31 SCREENING FOR DEPRESSION: ICD-10-CM

## 2017-06-13 DIAGNOSIS — I10 ESSENTIAL HYPERTENSION: ICD-10-CM

## 2017-06-13 DIAGNOSIS — E55.9 UNSPECIFIED VITAMIN D DEFICIENCY: ICD-10-CM

## 2017-06-13 DIAGNOSIS — F71 MR (MENTAL RETARDATION), MODERATE: ICD-10-CM

## 2017-06-13 DIAGNOSIS — E07.9 THYROID CONDITION: ICD-10-CM

## 2017-06-13 DIAGNOSIS — Q87.11 PRADER-WILLI SYNDROME: ICD-10-CM

## 2017-06-13 DIAGNOSIS — K21.9 GASTROESOPHAGEAL REFLUX DISEASE WITHOUT ESOPHAGITIS: ICD-10-CM

## 2017-06-13 DIAGNOSIS — Z00.00 HEALTHCARE MAINTENANCE: Primary | ICD-10-CM

## 2017-06-13 DIAGNOSIS — E78.5 OTHER AND UNSPECIFIED HYPERLIPIDEMIA: ICD-10-CM

## 2017-06-13 DIAGNOSIS — E11.65 TYPE 2 DIABETES MELLITUS WITH HYPERGLYCEMIA, UNSPECIFIED LONG TERM INSULIN USE STATUS: ICD-10-CM

## 2017-06-13 DIAGNOSIS — E53.8 B12 DEFICIENCY: ICD-10-CM

## 2017-06-13 LAB
ALBUMIN UR QL STRIP: 10 MG/L
CREATININE, URINE POC: 10 MG/DL
MICROALBUMIN/CREAT RATIO POC: <30 MG/G

## 2017-06-13 NOTE — PROGRESS NOTES
Chief Complaint   Patient presents with   Sri Lorna Annual Wellness Visit     1. Have you been to the ER, urgent care clinic since your last visit? Hospitalized since your last visit? No    2. Have you seen or consulted any other health care providers outside of the [facility] since your last visit? Include any pap smears or colon screening. No     Reviewed record in preparation for visit and have obtained necessary documentation.

## 2017-06-13 NOTE — LETTER
6/26/2017 4:17 PM 
 
Ms. Aditya Yost [address] [facility] [address] Dear Aditya Yost: 
 
Please find your most recent results below. Resulted Orders VITAMIN D, 25 HYDROXY Result Value Ref Range VITAMIN D, 25-HYDROXY CANCELED ng/mL Comment:  
   [facility] was unable to collect sufficient specimen to perform the 
following test(s), and is providing the patient with re-collection 
instructions. Vitamin D deficiency has been defined by the [address] [place] [address] practice guideline as a 
level of serum 25-OH vitamin D less than 20 ng/mL (1,2). The Endocrine Society went on to further define vitamin D 
insufficiency as a level between 21 and 29 ng/mL (2). 1. IOM ([place] of Medicine). 2010. Dietary reference 
   intakes for calcium and D. [address] [facility]: The 
   Pixtr. 2. Russel MF, Levi DELGADO, Graeme HO, et al. 
   Evaluation, treatment, and prevention of vitamin D 
   deficiency: an Endocrine Society clinical practice 
   guideline. JCEM. 2011 Jul; 96(7):1911-30. Result canceled by the ancillary Narrative Performed at:  [address]  [id number] : Niki Senior MD, Phone:  [phone] [facility] AND [facility] Result Value Ref Range Luteinizing hormone <0.2 mIU/mL Comment:  
                       Adult Female: Follicular phase      2.4 -  12.6 Ovulation phase      14.0 -  95.6 Luteal phase          1.0 -  11.4 Postmenopausal        7.7 -  58.5 FSH <0.2 mIU/mL Comment:  
                       Adult Female: Follicular phase      3.5 -  12.5 Ovulation phase       4.7 -  21.5 Luteal phase          1.7 -   7.7 Postmenopausal       25.8 - 134.8 Narrative Performed at:  [address]  [id number] : Zora Heredia MD, Phone:  [phone] ESTRADIOL Result Value Ref Range Estradiol 21.1 pg/mL Comment:  
                       Adult Female: Follicular phase   22.9 -   166.0 Ovulation phase    85.8 -   498.0 Luteal phase       43.8 -   211.0 Postmenopausal     <6.0 -    54.7 Pregnancy 1st trimester     215.0 - >4300.0 Girls (1-10 years)    6.0 -    27.0 Roche ECLIA methodology Narrative Performed at:  [address]  [id number] : Zora Heredia MD, Phone:  [phone] PROLACTIN Result Value Ref Range Prolactin 26.1 (H) 4.8 - 23.3 ng/mL Narrative Performed at:  [address]  [id number] : Zora Heredia MD, Phone:  [phone] HEMOGLOBIN A1C WITH EAG Result Value Ref Range Hemoglobin A1c 8.6 (H) 4.8 - 5.6 % Comment:  
            Pre-diabetes: 5.7 - 6.4 Diabetes: >6.4 Glycemic control for adults with diabetes: <7.0 Estimated average glucose 200 mg/dL Narrative Performed at:  [address]  [id number] : Zora Heredia MD, Phone:  [phone] VITAMIN B12 Result Value Ref Range Vitamin B12 593 211 - 946 pg/mL Narrative Performed at:  [address]  [id number] : Zora Heredia MD, Phone:  [phone] THYROID PANEL Result Value Ref Range T4, Total CANCELED ug/dL Comment:  
   [facility] to perform the 
following test(s), and is providing the patient with re-collection 
instructions. Result canceled by the ancillary T3 Uptake CANCELED Comment:  
   Test not performed Result canceled by the ancillary Narrative Performed at:  [address]  [id number] : Finn Patricio MD, Phone:  [phone] AMB POC URINE, MICROALBUMIN, SEMIQUANT (3 RESULTS) Result Value Ref Range ALBUMIN, URINE POC 10 Negative mg/L  
 CREATININE, URINE POC 10 mg/dL Microalbumin/creat ratio (POC) <30 MG/G  
CHOLESTEROL, HDL Result Value Ref Range HDL Cholesterol CANCELED mg/dL Comment:  
   [facility] was unable to collect sufficient specimen to perform the 
following test(s), and is providing the patient with re-collection 
instructions. Result canceled by the ancillary Narrative Performed at:  [address]  [id number] : Finn Patricio MD, Phone:  [phone] CHOLESTEROL, TOTAL Result Value Ref Range Cholesterol, total CANCELED mg/dL Comment:  
   [facility] was unable to collect sufficient specimen to perform the 
following test(s), and is providing the patient with re-collection 
instructions. Result canceled by the ancillary Narrative Performed at:  [address]  [id number] : Finn Patricio MD, Phone:  [phone] LDL, DIRECT Result Value Ref Range LDL,Direct 107 (H) 0 - 99 mg/dL Narrative Performed at:  [address]  [id number] : Finn Patricio MD, Phone:  [phone] METABOLIC PANEL, BASIC Result Value Ref Range Glucose CANCELED mg/dL Comment:  
   [facility] was unable to collect sufficient specimen to perform the 
following test(s), and is providing the patient with re-collection 
instructions. Result canceled by the ancillary BUN CANCELED Comment:  
   Test not performed Result canceled by the ancillary Creatinine CANCELED Comment:  
   Test not performed Result canceled by the ancillary Sodium CANCELED Comment:  
   Test not performed Result canceled by the ancillary Potassium CANCELED Comment:  
   Test not performed Result canceled by the ancillary Chloride CANCELED Comment:  
   Test not performed Result canceled by the ancillary CO2 CANCELED Comment:  
   Test not performed Result canceled by the ancillary Calcium CANCELED Comment:  
   Test not performed Result canceled by the ancillary Narrative Performed at:  [address]  [id number] : Ryanne Álvarez MD, Phone:  [phone] ALT Result Value Ref Range ALT (SGPT) CANCELED IU/L Comment:  
   [facility] sufficient specimen to perform the 
following test(s), and is providing the patient with re-collection 
instructions. Result canceled by the ancillary Narrative Performed at:  [address]  [id number] : Ryanne Álvarez MD, Phone:  [phone] HGB & HCT Result Value Ref Range HGB 12.8 11.1 - 15.9 g/dL HCT 39.3 34.0 - 46.6 % Narrative Performed at:  [address]  [id number] : Ryanne Álvarez MD, Phone:  [phone] CVD REPORT Result Value Ref Range INTERPRETATION Note Comment:  
   Medical Director's Note: A CV Risk Assessment Report was not 
sent because both LDL cholesterol and non-HDL cholesterol 
results are required to generate a report. Supplement report is available. Narrative Performed at:  [address] [place] [address] [address] [place] [address]  [id number] : Yamilet Acosta PhD, Phone:  [phone] DIABETES PATIENT EDUCATION Result Value Ref Range PDF Image Not applicable Narrative Performed at:  [address] [place] [address]  [id number] : Yvonne Gibson PhD, Phone:  [phone] Your labs are back but some of them had to be cancelled. Please show this copy to your specialist to see if anything needs to be redone. Sincerely, Rachael Avalos MD

## 2017-06-13 NOTE — MR AVS SNAPSHOT
Visit Information Date & Time Provider Department Dept. Phone Encounter #  
 6/13/2017 11:00 AM Milad Martinez MD [address] [place] [address] [phone] [id number] Upcoming Health Maintenance Date Due MICROALBUMIN Q1 4/19/2017 EYE EXAM RETINAL OR DILATED Q1 4/21/2017 HEMOGLOBIN A1C Q6M 6/15/2017* LIPID PANEL Q1 6/15/2017* INFLUENZA AGE 9 TO ADULT 8/1/2017 FOOT EXAM Q1 3/24/2018 DTaP/Tdap/Td series (2 - Td) 9/1/2026 *Topic was postponed. The date shown is not the original due date. Allergies as of 6/13/2017  Review Complete On: 6/13/2017 By: Gray Brooke LPN Severity Noted Reaction Type Reactions Bactrim [Sulfamethoprim Ds]  11/09/2015    Other (comments) Nausea, abdominal pain and cramping Byetta [Exenatide]  10/21/2010    Rash Current Immunizations  Reviewed on 11/9/2015 Name Date Influenza Vaccine 10/20/2015, 10/8/2013 Influenza Vaccine (Quad) PF 9/8/2016 Influenza Vaccine PF 9/14/2012 Pneumococcal Conjugate (PCV-13) 5/5/2015 Pneumococcal Vaccine (Unspecified Type) 2/2/2011 TD Vaccine 12/2/2010 Not reviewed this visit You Were Diagnosed With   
  
 Codes Comments Healthcare maintenance    -  Primary ICD-10-CM: Z00.00 ICD-9-CM: V70.0 Unspecified vitamin D deficiency     ICD-10-CM: E55.9 ICD-9-CM: 268.9 Gastroesophageal reflux disease without esophagitis     ICD-10-CM: K21.9 ICD-9-CM: 530.81 Prader-Willi syndrome     ICD-10-CM: Q87.1 ICD-9-CM: 759.81 Other and unspecified hyperlipidemia     ICD-10-CM: E78.5 ICD-9-CM: 272.4 Essential hypertension     ICD-10-CM: I10 
ICD-9-CM: 401.9 Type 2 diabetes mellitus with hyperglycemia, unspecified long term insulin use status     ICD-10-CM: E11.65 ICD-9-CM: 250.00   
 B12 deficiency     ICD-10-CM: E53.8 ICD-9-CM: 266.2 Thyroid condition     ICD-10-CM: E07.9 ICD-9-CM: 246.9  Screening for depression     ICD-10-CM: Z13.89 
 ICD-9-CM: V79.0 Vitals Pulse Temp Resp Height(growth percentile) Weight(growth percentile) SpO2  
 97 97.7 °F (36.5 °C) (Axillary) 18 4' 7\" (1.397 m) 288 lb (130.6 kg) 96% BMI OB Status Smoking Status 66.94 kg/m2 Needs review Never Smoker Vitals History BMI and BSA Data Body Mass Index Body Surface Area  
 66.94 kg/m 2 2.25 m 2 Preferred Pharmacy Pharmacy Name Phone [facility] [address]  [facility] [phone] Your Updated Medication List  
  
   
This list is accurate as of: 6/13/17 11:47 AM.  Always use your most recent med list.  
  
  
  
  
 Adhesive Bandage 6 X 8 \" Bndg Commonly known as:  Tendra Mepilex Border 1 Each by Apply Externally route daily. One, T14.8, 2x4cm open area with bloody exudate ALPRAZolam 0.25 mg tablet Commonly known as:  XANAX  
TAKE 1 TABLET BY MOUTH THREE TIMES DAILY AS NEEDED  
  
 ammonium lactate 12 % topical cream  
Commonly known as:  AMLACTIN  
APPLY TO THE AFFECTED AREA TWICE DAILY AND RUB WELL  
  
 diclofenac 1 % Gel Commonly known as:  VOLTAREN Apply  to affected area four (4) times daily. fenofibrate nanocrystallized 145 mg tablet Commonly known as:  TRICOR  
TAKE 1 TABLET BY MOUTH DAILY  
  
 fexofenadine 180 mg tablet Commonly known as:  Donnamarie Nidia Take 1 Tab by mouth daily. fluticasone 50 mcg/actuation nasal spray Commonly known as:  Lisa Aid 2 Sprays by Both Nostrils route daily. furosemide 20 mg tablet Commonly known as:  LASIX TAKE 1 TABLET BY MOUTH EVERY DAY  
  
 hydrOXYzine pamoate 25 mg capsule Commonly known as:  VISTARIL  
TAKE 1 CAPSULE BY MOUTH THREE TIMES DAILY AS NEEDED FOR ITCHING  
  
 meloxicam 7.5 mg tablet Commonly known as:  MOBIC Take 1 Tab by mouth daily.  Indications: OSTEOARTHRITIS  
  
 mupirocin calcium 2 % topical cream  
Commonly known as:  [facility]  
 APPLY EXTERNALLY TO THE AFFECTED AREA EVERY DAY AS DIRECTED  
  
 naftifine 1 % topical cream  
Commonly known as:  NAFTIN  
APPLY EXTERNALLY TO THE AFFECTED AREA EVERY DAY FOR 2 WEEKS OR AS DIRECTED  
  
 nystatin-triamcinolone topical cream  
Commonly known as:  MYCOLOG II  
APPLY EXTERNALLY TO THE AFFECTED AREA TWICE DAILY  
  
 * pantoprazole 40 mg tablet Commonly known as:  PROTONIX Take 1 Tab by mouth daily. * pantoprazole 20 mg tablet Commonly known as:  PROTONIX  
TAKE 1 TABLET BY MOUTH TWICE DAILY  
  
 risperiDONE 1 mg tablet Commonly known as:  RisperDAL TAKE 1 TABLET BY MOUTH TWICE DAILY  
  
 traMADol 50 mg tablet Commonly known as:  ULTRAM  
Take 50 mg by mouth every six (6) hours as needed for Pain. * Notice: This list has 2 medication(s) that are the same as other medications prescribed for you. Read the directions carefully, and ask your doctor or other care provider to review them with you. We Performed the Following ALT L4700231 CPT(R)] AMB POC URINE, MICROALBUMIN, SEMIQUANT (3 RESULTS) [23771 CPT(R)] CHOLESTEROL, HDL E411972 CPT(R)] CHOLESTEROL, TOTAL [83352 CPT(R)] ESTRADIOL [id number] CPT(R)] [facility] AND  [51019 CPT(R)] HEMOGLOBIN A1C WITH EAG [17027 CPT(R)] HGB & HCT [46159 CPT(R)]  DIABETES EYE EXAM [HM6 Custom] LDL, DIRECT [id number] CPT(R)] METABOLIC PANEL, BASIC [09743 CPT(R)] [address] [place] [ [facility]] PROLACTIN [10798 CPT(R)] REFERRAL TO OPHTHALMOLOGY [REF57 Custom] Comments:  
 Please evaluate patient for DM. THYROID PANEL [id number] CPT(R)] VITAMIN B12 Y275140 CPT(R)] VITAMIN D, 25 HYDROXY X496333 CPT(R)] Referral Information Referral ID Referred By Referred To  
  
 [id number] CATHERINE, [address] [facility] Dolores Arcos M.D.   
   [address] [place], [address] Visits Status Start Date End Date 1 New Request 6/13/17 6/13/18 If your referral has a status of pending review or denied, additional information will be sent to support the outcome of this decision. Patient Instructions Labs today Stay active Eye exam soon Introducing [facility]! Francine Lindsay introduces [employer] patient portal. Now you can access parts of your medical record, email your doctor's office, and request medication refills online. 1. In your internet browser, go to [email or web address] 2. Click on the First Time User? Click Here link in the Sign In box. You will see the New Member Sign Up page. 3. Enter your [employer] Access Code exactly as it appears below. You will not need to use this code after youve completed the sign-up process. If you do not sign up before the expiration date, you must request a new code. · [employer] Access Code: [email or web address] Expires: 6/13/2017 12:13 PM 
 
4. Enter the last four digits of your Social Security Number (xxxx) and Date of Birth (mm/dd/yyyy) as indicated and click Submit. You will be taken to the next sign-up page. 5. Create a [employer] ID. This will be your [employer] login ID and cannot be changed, so think of one that is secure and easy to remember. 6. Create a [employer] password. You can change your password at any time. 7. Enter your Password Reset Question and Answer. This can be used at a later time if you forget your password. 8. Enter your e-mail address. You will receive e-mail notification when new information is available in [address] 9. Click Sign Up. You can now view and download portions of your medical record. 10. Click the Download Summary menu link to download a portable copy of your medical information. If you have questions, please visit the Frequently Asked Questions section of the [employer] website. Remember, [employer] is NOT to be used for urgent needs. For medical emergencies, dial 911. Now available from your iPhone and Android! Please provide this summary of care documentation to your next provider. Your primary care clinician is listed as Marylin Ellison. If you have any questions after today's visit, please call [phone].

## 2017-06-13 NOTE — PROGRESS NOTES
Ivette Yao is a 28 y.o. female      Issues discussed today include:    WELLNESS     GYN:  She is not sexually active  Mammogram:  Not yet  LMP:  irregular  last pap:  She does not  DEXA:  Not yet    Hearing screen:   done  Vision screening:  Needs 2017  Depression screening:   Done, PHQ9 = 2  Fall assessment:   done    BMI: Body mass index is 66.94 kg/(m^2). I have reviewed/discussed the above normal BMI with the parent. I have recommended the following interventions: encourage exercise . Yandy Eyadnirmala Colonoscopy:  Not yet  TDAP:  2010  Pneumovax:  2011  PCV-13:  2015  Flu shot:  2016  Zostavax:  Not yet  Eye exam:  Needs 2017  EKG: On file    FTF for DME:  done:  CPAP 8  Advanced directive:  Full code  Specialists:  [address]  CKD [place]  Sleep [place]  CV [address]  psych [employer]  In general, I advise patients to be as active as possible. I believe exercise is the key to long life and good health. The current recommendation is for individuals to exercise for 150 minutes each week (in other words 30 minutes 5 days a week). Exercise should be vigorous enough to work up a sweat. These activities include brisk walking, running, tennis, swimming, weight-lifting, etc.     I usually tell folks that work is work and exercise is exercise. Each of these activities has a different goal.  Even though you may be active at work, it may not be aerobically adequate. So build dedicated exercise time into your weekly routine. If a patient drinks alcohol, I suggest that a male drink only 2 beers (or glasses of wine, or shots of liquor) in any 24 hour period ( and not daily). For females, the limits are one drink per 24 hours (and not daily). After these limits, the toxic effects of alcohol consumption start to manifest.     Avoid tobacco products. I may provide separate information discussing specific smoking cessation instructions if needed.       I suggest a wellness exam yearly during your birth month to update health maintenance issues such as fasting labs, EKGs and other studies, appropriate cancer screenings,  immunizations, etc.      I suggest a yearly flu shot    Please call Josewinnie Guadarrama to help arrange and authorize any tests or referrals. Her # is [phone]         Data reviewed or ordered today:  labs    Other problems include:  Patient Active Problem List   Diagnosis Code    Prader-Willi syndrome Q87.1    CHF (congestive heart failure) ([facility] [facility] I50.9    Lymphedema I89.0    Anxiety disorder F41.9    Scoliosis M41.9    DM (diabetes mellitus), type 2 ([facility] [address] E11.9    HTN (hypertension) I10    Hyperlipidemia E78.5    Sinusitis J32.9    Sleep apnea G47.30    Bleeding hemorrhoids K64.9    ARF (acute renal failure) (HCC) N17.9    CKD (chronic kidney disease) N18.9    Ingrown toenail without infection L60.0    Urinary incontinence R32    MR (mental retardation), moderate F71    Psychosis F29       Medications:  Current Outpatient Prescriptions   Medication Sig Dispense Refill    nystatin-triamcinolone (MYCOLOG II) topical cream APPLY EXTERNALLY TO THE AFFECTED AREA TWICE DAILY 240 g 0    ammonium lactate (AMLACTIN) 12 % topical cream APPLY TO THE AFFECTED AREA TWICE DAILY AND RUB WELL 385 g 0    pantoprazole (PROTONIX) 20 mg tablet TAKE 1 TABLET BY MOUTH TWICE DAILY 60 Tab 0    ALPRAZolam (XANAX) 0.25 mg tablet TAKE 1 TABLET BY MOUTH THREE TIMES DAILY AS NEEDED 90 Tab 0    furosemide (LASIX) 20 mg tablet TAKE 1 TABLET BY MOUTH EVERY DAY 90 Tab 0    pantoprazole (PROTONIX) 40 mg tablet Take 1 Tab by mouth daily. 90 Tab 1    naftifine (NAFTIN) 1 % topical cream APPLY EXTERNALLY TO THE AFFECTED AREA EVERY DAY FOR 2 WEEKS OR AS DIRECTED 60 g 0    risperiDONE (RISPERDAL) 1 mg tablet TAKE 1 TABLET BY MOUTH TWICE DAILY 60 Tab 0    Adhesive Bandage (TENDRA MEPILEX BORDER) 6 X 8 \" bndg 1 Each by Apply Externally route daily.  One, T14.8, 2x4cm open area with bloody exudate 60 Each 12  traMADol (ULTRAM) 50 mg tablet Take 50 mg by mouth every six (6) hours as needed for Pain.  diclofenac (VOLTAREN) 1 % gel Apply  to affected area four (4) times daily. 1 Each 11    mupirocin calcium (BACTROBAN) 2 % topical cream APPLY EXTERNALLY TO THE AFFECTED AREA EVERY DAY AS DIRECTED 30 g 0    hydrOXYzine (VISTARIL) 25 mg capsule TAKE 1 CAPSULE BY MOUTH THREE TIMES DAILY AS NEEDED FOR ITCHING 30 Cap 0    fexofenadine (ALLEGRA) 180 mg tablet Take 1 Tab by mouth daily. 30 Tab 11    fluticasone (FLONASE) 50 mcg/actuation nasal spray 2 Sprays by Both Nostrils route daily. 1 Bottle 6    fenofibrate nanocrystallized (TRICOR) 145 mg tablet TAKE 1 TABLET BY MOUTH DAILY 90 Tab 0    meloxicam (MOBIC) 7.5 mg tablet Take 1 Tab by mouth daily. Indications: OSTEOARTHRITIS 10 Tab 0       Allergies: Allergies   Allergen Reactions    Bactrim [Sulfamethoprim Ds] Other (comments)     Nausea, abdominal pain and cramping      Byetta [Exenatide] Rash       LMP:  No LMP recorded. Social History     Social History    Marital status: SINGLE     Spouse name: N/A    Number of children: N/A    Years of education: N/A     Occupational History    Not on file. Social History Main Topics    Smoking status: Never Smoker    Smokeless tobacco: Never Used    Alcohol use No    Drug use: No    Sexual activity: No     Other Topics Concern    Not on file     Social History Narrative         Family History   Problem Relation Age of Onset    Hypertension Mother     Hypertension Father     MS Brother          Meaningful use:  done      ROS:  Headaches:  no  Chest Pain:  no  SOB:  no  Fevers:  no  Falls:  no  anxiety/depression/losing interest in doing things that were previously enjoyed:  no.   PHQ2 = 0  Other significant ROS:    Patient denied problems with:    Hearing/vision, speaking/swallowing, Reflux/indigestion, Cough,Diarrhea/constipation,Problems passing or controlling urine,  mood (anxiety/depression/losing interest in doing things that were previously enjoyed), Fatigue, Weight change, memory                                                         Any other Positive ROS include: snoring better on CPAP        Falls in the past 12 months:  no           Over the last year (or since your last visit):  Have you been diagnosed with heart attack, stroke, fractures, or skin cancer = no    Exercise:  Swims 3 times a week             Smoking history:  none                                    No LMP recorded. Physical Exam  Visit Vitals    Pulse 97    Temp 97.7 °F (36.5 °C) (Axillary)    Resp 18    Ht 4' 7\" (1.397 m)    Wt 288 lb (130.6 kg)    SpO2 96%    BMI 66.94 kg/m2     BP Readings from Last 3 Encounters:   05/26/16 130/80   04/26/16 129/62   07/23/15 104/80     Constitutional:  Appears well,  No Acute Distress, Vitals noted  Psychiatric:   Affect stable/moves her head a lot, Alert and cooperative, MR    Eyes:   Pupils equally round and reactive, EOMI, conjunctiva clear, eyelids normal  ENT:   External ears and nose normal/lips, teeth=OK/gums normal, TMs and Orophyarynx normal  Neck:   general inspection and Thyroid normal.  No abnormal cervical or supraclavicular nodes    Lungs:   clear to auscultation, good respiratory effort  Heart: Ausculation normal.  Regular rhythm. No cardiac murmurs.   No carotid bruits or palpable thrills  Chest wall normal  Abdominal exam:   normal.  Liver and spleen normal.  No bruits/masses/tenderness    Extremities:  +PWS edema, good peripheral pulses  Skin:   Warm to palpation, without rashes, bruising, or suspicious lesions some tinea corporis no breakdown    Neuro:  No facial droop, speech fluent, EOMI, Pupils equally round and reactive to light, visual fields seem OK, hearing seems normal and symmetrical,smile symmetrical, puffs out cheeks symmetrically    Shoulder shrug symmetrical     moves all extremities, strength/sensationseem intact and symmetrical    balance seems OK, no pronator drift, gait normal. \"get up and go\" test OK    squats OK, heel standing/toe standing OK    no tenderness of C spine, T spine, LS spine, flexion/extension of spine OK    Stable MR    [place]:  Full passive ROM all joints                  Assessment:    Patient Active Problem List   Diagnosis Code    Prader-Willi syndrome Q87.1    CHF (congestive heart failure) ([facility]) I50.9    Lymphedema I89.0    Anxiety disorder F41.9    Scoliosis M41.9    DM (diabetes mellitus), type 2 ([facility]) E11.9    HTN (hypertension) I10    Hyperlipidemia E78.5    Sinusitis J32.9    Sleep apnea G47.30    Bleeding hemorrhoids K64.9    ARF (acute renal failure) ([facility]) N17.9    CKD (chronic kidney disease) N18.9    Ingrown toenail without infection L60.0    Urinary incontinence R32    MR (mental retardation), moderate F71    Psychosis F29       Today's diagnoses are:    ICD-10-CM ICD-9-CM    1. Healthcare maintenance Z00.00 V70.0    2. Unspecified vitamin D deficiency E55.9 268.9 VITAMIN D, 25 HYDROXY   3. Gastroesophageal reflux disease without esophagitis K21.9 530.81    4. Prader-Willi syndrome Q87.1 759.81 FSH AND LH      ESTRADIOL      PROLACTIN      THYROID PANEL   5. Other and unspecified hyperlipidemia E78.5 272.4    6. Essential hypertension I10 401.9    7. Type 2 diabetes mellitus with hyperglycemia, unspecified long term insulin use status E11.65 250.00 HEMOGLOBIN A1C WITH EAG      AMB POC URINE, MICROALBUMIN, SEMIQUANT (3 RESULTS)      CHOLESTEROL, HDL      CHOLESTEROL, TOTAL      LDL, DIRECT      METABOLIC PANEL, BASIC      ALT      HGB & HCT      REFERRAL TO OPHTHALMOLOGY       DIABETES EYE EXAM   8. B12 deficiency E53.8 266.2 VITAMIN B12   9. Thyroid condition E07.9 246.9 THYROID PANEL   10.  Screening for depression Z13.89 V79.0 CO DEPRESSION SCREEN ANNUAL       Plan:  Orders Placed This Encounter    VITAMIN D, 25 HYDROXY    [address] AND LH    ESTRADIOL    PROLACTIN    HEMOGLOBIN A1C WITH EAG    VITAMIN B12    THYROID PANEL    CHOLESTEROL, HDL    CHOLESTEROL, TOTAL    LDL, DIRECT    METABOLIC PANEL, BASIC    ALT    HGB & HCT    REFERRAL TO OPHTHALMOLOGY     Referral Priority:   Routine     Referral Type:   Consultation     Referral Reason:   Specialty Services Required     Referral Location:   Agnes Baig. Dayne Rojo M.D.      Referred to Provider:   Shilo Martines MD     Requested Specialty:   Ophthalmology    AMB POC URINE, MICROALBUMIN, SEMIQUANT (3 RESULTS)    HM DIABETES EYE EXAM    IN DEPRESSION SCREEN ANNUAL       See patient instructions  Patient Instructions   Labs today    Stay active    Eye exam soon            refresh note:  done    AVS Printed:  done

## 2017-06-13 NOTE — PATIENT INSTRUCTIONS
Labs today    Stay active    Eye exam soon    Family support will be key during mom's upcoming surgery and recovery (FMLA forms completed)

## 2017-06-19 LAB
25(OH)D3+25(OH)D2 SERPL-MCNC: NORMAL NG/ML
ALT SERPL-CCNC: NORMAL IU/L
BUN SERPL-MCNC: NORMAL MG/DL
CALCIUM SERPL-MCNC: NORMAL MG/DL
CHLORIDE SERPL-SCNC: NORMAL MMOL/L
CHOLEST SERPL-MCNC: NORMAL MG/DL
CO2 SERPL-SCNC: NORMAL MMOL/L
CREAT SERPL-MCNC: NORMAL MG/DL
EST. AVERAGE GLUCOSE BLD GHB EST-MCNC: 200 MG/DL
ESTRADIOL SERPL-MCNC: 21.1 PG/ML
FSH SERPL-ACNC: <0.2 MIU/ML
GLUCOSE SERPL-MCNC: NORMAL MG/DL
HBA1C MFR BLD: 8.6 % (ref 4.8–5.6)
HCT VFR BLD AUTO: 39.3 % (ref 34–46.6)
HDLC SERPL-MCNC: NORMAL MG/DL
HGB BLD-MCNC: 12.8 G/DL (ref 11.1–15.9)
INTERPRETATION, 910389: NORMAL
LDLC SERPL DIRECT ASSAY-MCNC: 107 MG/DL (ref 0–99)
LH SERPL-ACNC: <0.2 MIU/ML
Lab: NORMAL
POTASSIUM SERPL-SCNC: NORMAL MMOL/L
PROLACTIN SERPL-MCNC: 26.1 NG/ML (ref 4.8–23.3)
SODIUM SERPL-SCNC: NORMAL MMOL/L
T3RU NFR SERPL: NORMAL %
T4 SERPL-MCNC: NORMAL UG/DL
VIT B12 SERPL-MCNC: 593 PG/ML (ref 211–946)

## 2017-06-26 NOTE — PROGRESS NOTES
Your labs are back but some of them had to be cancelled. Please show this copy to your specialist to see if anything needs to be redone.

## 2017-07-10 RX ORDER — PANTOPRAZOLE SODIUM 20 MG/1
TABLET, DELAYED RELEASE ORAL
Qty: 60 TAB | Refills: 0 | Status: SHIPPED | OUTPATIENT
Start: 2017-07-10 | End: 2017-08-04 | Stop reason: SDUPTHER

## 2017-07-10 RX ORDER — NYSTATIN AND TRIAMCINOLONE ACETONIDE 100000; 1 [USP'U]/G; MG/G
CREAM TOPICAL
Qty: 240 G | Refills: 0 | Status: SHIPPED | OUTPATIENT
Start: 2017-07-10 | End: 2017-08-04 | Stop reason: SDUPTHER

## 2017-07-10 RX ORDER — AMMONIUM LACTATE 12 G/100G
CREAM TOPICAL
Qty: 385 G | Refills: 0 | Status: SHIPPED | OUTPATIENT
Start: 2017-07-10 | End: 2017-08-04 | Stop reason: SDUPTHER

## 2017-08-05 RX ORDER — PANTOPRAZOLE SODIUM 20 MG/1
TABLET, DELAYED RELEASE ORAL
Qty: 60 TAB | Refills: 0 | Status: SHIPPED | OUTPATIENT
Start: 2017-08-05 | End: 2017-09-07 | Stop reason: SDUPTHER

## 2017-08-05 RX ORDER — NYSTATIN AND TRIAMCINOLONE ACETONIDE 100000; 1 [USP'U]/G; MG/G
CREAM TOPICAL
Qty: 240 G | Refills: 0 | Status: SHIPPED | OUTPATIENT
Start: 2017-08-05 | End: 2017-09-07 | Stop reason: SDUPTHER

## 2017-08-05 RX ORDER — FUROSEMIDE 20 MG/1
TABLET ORAL
Qty: 90 TAB | Refills: 0 | Status: SHIPPED | OUTPATIENT
Start: 2017-08-05

## 2017-08-05 RX ORDER — NAFTIFINE HYDROCHLORIDE 1 MG/G
CREAM TOPICAL
Qty: 60 G | Refills: 0 | Status: SHIPPED | OUTPATIENT
Start: 2017-08-05 | End: 2018-02-21 | Stop reason: SDUPTHER

## 2017-08-05 RX ORDER — AMMONIUM LACTATE 12 G/100G
CREAM TOPICAL
Qty: 385 G | Refills: 0 | Status: SHIPPED | OUTPATIENT
Start: 2017-08-05 | End: 2017-09-07 | Stop reason: SDUPTHER

## 2017-08-08 ENCOUNTER — TELEPHONE (OUTPATIENT)
Dept: FAMILY MEDICINE CLINIC | Age: 32
End: 2017-08-08

## 2017-08-08 NOTE — TELEPHONE ENCOUNTER
----- Message from Seble Gann sent at 8/8/2017  5:16 PM EDT -----  Regarding: Dr. Rivka Drake, pt's mother, requesting status of Rx \"Zanax\" . 25mg, requested last week, either 08/03/17 or 08/04/17, to [address] on file. Best contact number for Samuel Sarmiento (mother) is [phone].

## 2017-08-09 RX ORDER — ALPRAZOLAM 0.25 MG/1
0.25 TABLET ORAL
Qty: 90 TAB | Refills: 1 | Status: SHIPPED | OUTPATIENT
Start: 2017-08-09 | End: 2018-02-21 | Stop reason: SDUPTHER

## 2017-08-09 NOTE — TELEPHONE ENCOUNTER
Patient needs a refill of the following  Requested Prescriptions     Pending Prescriptions Disp Refills    ALPRAZolam (XANAX) 0.25 mg tablet 90 Tab 0

## 2017-08-11 ENCOUNTER — TELEPHONE (OUTPATIENT)
Dept: FAMILY MEDICINE CLINIC | Age: 32
End: 2017-08-11

## 2017-08-11 NOTE — TELEPHONE ENCOUNTER
Per patient mother, medication was to have been called into pharmacy. Asking that this please be addressed ASAP. Medication Detail      Disp Refills Start End      ALPRAZolam (XANAX) 0.25 mg tablet 90 Tab 1 8/9/2017     Sig - Route: Take 1 Tab by mouth three (3) times daily as needed for Anxiety.  Max Daily Amount: 0.75 mg. - Oral    Class: Print          Asking to be notified when addressed    thanks

## 2017-08-14 NOTE — TELEPHONE ENCOUNTER
Message from [address] [place] [address]. Dr. Nate Jasmine/ telephone  Received: Today       [facility] Front Office       Phone Number: [phone]                     Pt mom is Edwin Lenz is requesting a call back to check the status of refill for alprazolam .25 mg. Pharmacy [facility] on [address] [phone]  has not received refill and mom wanted to know if she had to  prescription from office.  Best contact number is [phone]

## 2017-08-18 ENCOUNTER — HOSPITAL ENCOUNTER (EMERGENCY)
Age: 32
Discharge: HOME | End: 2017-08-18
Attending: EMERGENCY MEDICINE
Payer: MEDICARE

## 2017-08-18 VITALS
DIASTOLIC BLOOD PRESSURE: 74 MMHG | RESPIRATION RATE: 20 BRPM | BODY MASS INDEX: 67.81 KG/M2 | HEIGHT: 55 IN | TEMPERATURE: 97.8 F | SYSTOLIC BLOOD PRESSURE: 120 MMHG | WEIGHT: 293 LBS | OXYGEN SATURATION: 96 % | HEART RATE: 112 BPM

## 2017-08-18 DIAGNOSIS — L03.311 CELLULITIS OF ABDOMINAL WALL: Primary | ICD-10-CM

## 2017-08-18 PROCEDURE — 74011250637 HC RX REV CODE- 250/637: Performed by: PHYSICIAN ASSISTANT

## 2017-08-18 PROCEDURE — 99283 EMERGENCY DEPT VISIT LOW MDM: CPT

## 2017-08-18 RX ORDER — CEPHALEXIN 500 MG/1
500 CAPSULE ORAL
Status: COMPLETED | OUTPATIENT
Start: 2017-08-18 | End: 2017-08-18

## 2017-08-18 RX ORDER — CEPHALEXIN 500 MG/1
500 CAPSULE ORAL 4 TIMES DAILY
Qty: 28 CAP | Refills: 0 | Status: SHIPPED | OUTPATIENT
Start: 2017-08-18 | End: 2017-08-25

## 2017-08-18 RX ADMIN — CEPHALEXIN 500 MG: 500 CAPSULE ORAL at 12:02

## 2017-08-18 NOTE — DISCHARGE INSTRUCTIONS
We hope that we have addressed all of your medical concerns. The examination and treatment you received in the Emergency Department were for an emergent problem and were not intended as complete care. It is important that you follow up with your healthcare provider(s) for ongoing care. If your symptoms worsen or do not improve as expected, and you are unable to reach your usual health care provider(s), you should return to the Emergency Department. Today's healthcare is undergoing tremendous change, and patient satisfaction surveys are one of the many tools to assess the quality of medical care. You may receive a survey from the [employer] regarding your experience in the Emergency Department. I hope that your experience has been completely positive, particularly the medical care that I provided. As such, please participate in the survey; anything less than excellent does not meet my expectations or intentions. [facility] and [address] [place] [address] participate in nationally recognized quality of care measures. If your blood pressure is greater than 120/80, as reported below, we urge that you seek medical care to address the potential of high blood pressure, commonly known as hypertension. Hypertension can be hereditary or can be caused by certain medical conditions, pain, stress, or \"white coat syndrome. \"       Please make an appointment with your health care provider(s) for follow up of your Emergency Department visit. VITALS:   Patient Vitals for the past 8 hrs:   Temp Pulse Resp BP SpO2   08/18/17 1103 97.8 °F (36.6 °C) (!) 112 20 120/74 96 %          Thank you for allowing us to provide you with medical care today. We realize that you have many choices for your emergency care needs. Please choose us in the future for any continued health care needs.       Regards,             Julienne Bamberger, PA-C    [facility], [place]. Office: [phone]            No results found for this or any previous visit (from the past 24 hour(s)). No results found. Cellulitis: Care Instructions  Your Care Instructions    Cellulitis is a skin infection. It often occurs after a break in the skin from a scrape, cut, bite, or puncture, or after a rash. The doctor has checked you carefully, but problems can develop later. If you notice any problems or new symptoms, get medical treatment right away. Follow-up care is a key part of your treatment and safety. Be sure to make and go to all appointments, and call your doctor if you are having problems. It's also a good idea to know your test results and keep a list of the medicines you take. How can you care for yourself at home? · Take your antibiotics as directed. Do not stop taking them just because you feel better. You need to take the full course of antibiotics. · Prop up the infected area on pillows to reduce pain and swelling. Try to keep the area above the level of your heart as often as you can. · If your doctor told you how to care for your wound, follow your doctor's instructions. If you did not get instructions, follow this general advice:  ¨ Wash the wound with clean water 2 times a day. Don't use hydrogen peroxide or alcohol, which can slow healing. ¨ You may cover the wound with a thin layer of petroleum jelly, such as Vaseline, and a nonstick bandage. ¨ Apply more petroleum jelly and replace the bandage as needed. · Be safe with medicines. Take pain medicines exactly as directed. ¨ If the doctor gave you a prescription medicine for pain, take it as prescribed. ¨ If you are not taking a prescription pain medicine, ask your doctor if you can take an over-the-counter medicine. To prevent cellulitis in the future  · Try to prevent cuts, scrapes, or other injuries to your skin. Cellulitis most often occurs where there is a break in the skin.   · If you get a scrape, cut, mild burn, or bite, wash the wound with clean water as soon as you can to help avoid infection. Don't use hydrogen peroxide or alcohol, which can slow healing. · If you have swelling in your legs (edema), support stockings and good skin care may help prevent leg sores and cellulitis. · Take care of your feet, especially if you have diabetes or other conditions that increase the risk of infection. Wear shoes and socks. Do not go barefoot. If you have athlete's foot or other skin problems on your feet, talk to your doctor about how to treat them. When should you call for help? Call your doctor now or seek immediate medical care if:  · You have signs that your infection is getting worse, such as:  ¨ Increased pain, swelling, warmth, or redness. ¨ Red streaks leading from the area. ¨ Pus draining from the area. ¨ A fever. · You get a rash. Watch closely for changes in your health, and be sure to contact your doctor if:  · You are not getting better after 1 day (24 hours). · You do not get better as expected. Where can you learn more? Go to http://darius-gricelda.info/. Luis Hawkins in the search box to learn more about \"Cellulitis: Care Instructions. \"  Current as of: October 13, 2016  Content Version: 11.3  © 0143-8164 [employer]. Care instructions adapted under license by [employer] (which disclaims liability or warranty for this information). If you have questions about a medical condition or this instruction, always ask your healthcare professional. [place] [address] any warranty or liability for your use of this information.

## 2017-08-18 NOTE — ED NOTES
Patient verbalizes understanding of discharge instructions. Pt alert, appears in no acute distress, respirations equal and unlabored. Ambulatory upon discharge with steady gait.

## 2017-08-18 NOTE — ED TRIAGE NOTES
Lower abdominal skin breakdown onset more than a week ago. Using 1%Silver Sulfadiazine cream 3 times daily.

## 2017-08-18 NOTE — ED NOTES
I personally saw and examined the patient. I have reviewed and agree with the MLP's findings, including all diagnostic interpretations, and plans as written. I was present during the key portions of separately billed procedures.     Caryle Marines, MD

## 2017-08-18 NOTE — ED PROVIDER NOTES
HPI Comments: 27 y/o female with PMHx of Prader-Willi syndrome, DM, HTN, CHF, obesity and anxiety, presenting with complaint of skin problem. Her mother states that she noticed an area of skin breakdown on the patient's lower abdomen about 1 week ago. She states that usually these areas of skin breakdown heal with application of silvadene cream and meriplex, however this area has not gone away. This morning she also noticed some drainage from the wound. The patient endorses severe pain that is non-radiating, made worse by pressure. No fevers, chills, other abdominal pain, nausea or vomiting. The history is provided by the patient and a parent. Past Medical History:   Diagnosis Date    Anxiety disorder     Severe    Congestive heart failure, unspecified     Diabetes ([address]     Hypertension     Lymph edema     Obesity     Prader - Willi syndrome        Past Surgical History:   Procedure Laterality Date    HX CHOLECYSTECTOMY  age 24    HX TONSIL AND ADENOIDECTOMY  age 10    HX WISDOM TEETH EXTRACTION  10/1/13         Family History:   Problem Relation Age of Onset    Hypertension Mother     Hypertension Father     MS Brother        Social History     Social History    Marital status: SINGLE     Spouse name: N/A    Number of children: N/A    Years of education: N/A     Occupational History    Not on file. Social History Main Topics    Smoking status: Never Smoker    Smokeless tobacco: Never Used    Alcohol use No    Drug use: No    Sexual activity: No     Other Topics Concern    Not on file     Social History Narrative         ALLERGIES: Bactrim [sulfamethoprim ds] and Byetta [exenatide]    Review of Systems   Constitutional: Negative for chills and fever. Respiratory: Negative for shortness of breath. Gastrointestinal: Negative for abdominal pain, nausea and vomiting. Musculoskeletal: Negative for arthralgias and myalgias. Skin: Positive for wound.    Neurological: Negative for weakness and numbness. All other systems reviewed and are negative. Vitals:    08/18/17 1103   BP: 120/74   Pulse: (!) 112   Resp: 20   Temp: 97.8 °F (36.6 °C)   SpO2: 96%   Weight: 136.5 kg (301 lb)   Height: 4' 7\" (1.397 m)            Physical Exam   Constitutional: She is oriented to person, place, and time. She appears well-developed and well-nourished. No distress. HENT:   Head: Normocephalic and atraumatic. Eyes: Conjunctivae and EOM are normal.   Neck: Normal range of motion. Neck supple. Cardiovascular: Tachycardia present. Pulmonary/Chest: Effort normal. No respiratory distress. Abdominal: Soft. She exhibits no distension. There is no tenderness. Musculoskeletal: Normal range of motion. Neurological: She is alert and oriented to person, place, and time. Skin: Skin is warm and dry. She is not diaphoretic. Lower abdomen with area of erythema and tenderness to palpation, with central open wound. Small amount of purulent drainage present. No appreciable fluctuance. Nursing note and vitals reviewed. MDM  Number of Diagnoses or Management Options     Amount and/or Complexity of Data Reviewed  Discuss the patient with other providers: yes (Dr. Karyn Sweeney, ED attending)      ED Course       Procedures    29 y/o female with PMHx of Prader-Willi syndrome, DM, HTN, CHF, obesity and anxiety, presenting with complaint of skin problem. History and exam as above, consistent with area of skin breakdown with overlying cellulitis. No evidence of abscess on exam. Patient afebrile, does not appear systemically ill. No labs/imaging currently indicated. Safe for outpatient treatment with Keflex 500mg QID x7 days. Recommended bacitracin ointment, discussed proper wound care techniques to do at home. Also advised outpatient wound center follow up. The patient and her mother verbalized understanding and agreement with this plan.

## 2017-08-21 ENCOUNTER — HOSPITAL ENCOUNTER (OUTPATIENT)
Dept: WOUND CARE | Age: 32
Discharge: HOME | End: 2017-08-21
Payer: MEDICARE

## 2017-08-21 ENCOUNTER — PATIENT OUTREACH (OUTPATIENT)
Dept: FAMILY MEDICINE CLINIC | Age: 32
End: 2017-08-21

## 2017-08-21 PROCEDURE — 99213 OFFICE O/P EST LOW 20 MIN: CPT

## 2017-08-21 PROCEDURE — 99215 OFFICE O/P EST HI 40 MIN: CPT

## 2017-08-21 RX ORDER — INSULIN ASPART 100 [IU]/ML
INJECTION, SOLUTION INTRAVENOUS; SUBCUTANEOUS
COMMUNITY
End: 2018-08-11

## 2017-08-21 RX ORDER — INSULIN GLARGINE 100 [IU]/ML
INJECTION, SOLUTION SUBCUTANEOUS 2 TIMES DAILY
COMMUNITY

## 2017-08-21 RX ORDER — LIDOCAINE HYDROCHLORIDE 40 MG/ML
SOLUTION TOPICAL AS NEEDED
Status: DISCONTINUED | OUTPATIENT
Start: 2017-08-21 | End: 2017-08-25 | Stop reason: HOSPADM

## 2017-08-21 NOTE — PROGRESS NOTES
[facility] Discharge Follow-Up    Goals      Patient/Family verbalizes understanding of self-management of chronic disease. Date/Time:  2017 4:21 PM    Patient listed on discharge [facility]) report on 2017. Patient discharged from [address] [place] [address] for cellulitis of abdominal wall. RRAT score: Low Risk            0       Total Score            Criteria that do not apply:    Has Seen PCP in Last 6 Months (Yes=3, No=0)    . Living with Significant Other. Assisted Living. LTAC. SNF. or   Rehab    Patient Length of Stay (>5 days = 3)    IP Visits Last 12 Months (1-3=4, 4=9, >4=11)    Pt. Coverage (Medicare=5 , Medicaid, or Self-Pay=4)    Charlson Comorbidity Score (Age + Comorbid Conditions)        Medical History:     Past Medical History:   Diagnosis Date    Anxiety disorder     Severe    Congestive heart failure, unspecified     Diabetes ([facility] [address]     Hypertension     Lymph edema     Obesity     Prader - Willi syndrome        Nurse Navigator(NN) contacted the patient by telephone to perform post ED discharge assessment. Verified  and address with patient as identifiers. Provided introduction to self, and explanation of the Nurses Navigator role. Diet:   Patient reports: Regular Diet    Activity:    Patient reports: mostly moving around the house    Medication:   Performed medication reconciliation with patient, and patient verbalizes understanding of administration of home medications. There were no barriers to obtaining medications identified at this time. Support system:  patient and mother    Discharge Instructions :  Reviewed discharge instructions with patient. Patient verbalizes understanding of discharge instructions and follow-up care. Labs Reviewed:  No results for input(s): WBC, HGB, HCT, PLT, HGBEXT, HCTEXT, PLTEXT in the last 72 hours.   No results for input(s): NA, K, CL, CO2, GLU, BUN, CREA, CA, MG, PHOS, ALB, TBIL, SGOT, ALT, INR in the last 72 hours. No lab exists for component: INREXT  No components found for: GLPOC  No results for input(s): PH, PCO2, PO2, HCO3, FIO2 in the last 72 hours. No results for input(s): INR in the last 72 hours. No lab exists for component: INREXT    Imaging results reviewed:      Advance Care Planning:   Patient was offered the opportunity to discuss advance care planning:  yes     Does patient have an Advance Directive:  yes   If no, did you provide information on Caring Connections? yes     PCP/Specialist follow up: Patient scheduled to follow up with Hugh Lowery MD on 8/29/2017. Dayton Martinez went [facility] for wound care today and tolerated care well. Reviewed red flags with patient, and patient verbalizes understanding. Patient given an opportunity to ask questions. No other clinical/social/functional needs noted. The patient agrees to contact the PCP office for questions related to their healthcare. The patient expressed thanks, offered no additional questions and ended the call.

## 2017-08-21 NOTE — PROGRESS NOTES
Wound Center  History and Physical    Subjective:     Chief Complaint:  Soumya Bell is a 28 y.o.  female who presents with lower abdomen wound of 1 weeks duration. Referred by:  Emergency Department    HPI:   This is a 28year old female patient who presented to the ER on Friday 8/18/17 with complaint of open wound on the lower abdomen which had started draining. Her mother notes she had the wound just a couple days when it began draining. The mother notes her daughter sometimes has area of irritation in this area (panus) especially when perspiring, but she is usually able to treat it with nystatin or triamcinolone ointment. This was unsuccessful this time and she went to the ER. They diagnosed her with cellulitis and gave her silvadene cream and meriplex along with Keflex four times daily and referred her to us. Wound caused by: pressure/friction in the panus area  Current wound care: silvadene cream and meriplex  Offloading wound: yes, as best she can  Appetite: good  Wound associated pain: mild- mother asked for prescription for lidocaine for dressing changes, and she does take tramadol as needed for the pain  Diabetic: yes  Smoker: no  ROS: no N/V, no T/chills; no local rash  Past Medical History:   Diagnosis Date    Anxiety disorder     Severe    Congestive heart failure, unspecified     Diabetes ([address]     Hypertension     Lymph edema     Obesity     Prader - Willi syndrome       Past Surgical History:   Procedure Laterality Date    HX CHOLECYSTECTOMY  age 25   [de-identified] TONSIL AND ADENOIDECTOMY  age 9   [de-identified] WISDOM TEETH EXTRACTION  10/1/13     Family History   Problem Relation Age of Onset    Hypertension Mother     Hypertension Father     MS Brother       Social History   Substance Use Topics    Smoking status: Never Smoker    Smokeless tobacco: Never Used    Alcohol use No       Prior to Admission medications    Medication Sig Start Date End Date Taking?  Authorizing Provider insulin glargine (LANTUS,BASAGLAR) 100 unit/mL (3 mL) inpn by SubCUTAneous route daily. Yes Historical Provider   insulin aspart (NOVOLOG FLEXPEN) 100 unit/mL inpn by SubCUTAneous route Before breakfast, lunch, dinner and at bedtime. Yes Historical Provider   cephALEXin (KEFLEX) 500 mg capsule Take 1 Cap by mouth four (4) times daily for 7 days. 8/18/17 8/25/17  JEFFERSON Brown   ALPRAZolam Marthann Shelton) 0.25 mg tablet Take 1 Tab by mouth three (3) times daily as needed for Anxiety. Max Daily Amount: 0.75 mg. 8/9/17   Karly Betancourt MD   naftifine (NAFTIN) 1 % topical cream APPLY EXTERNALLY TO THE AFFECTED AREA EVERY DAY FOR 2 WEEKS OR AS DIRECTED 8/5/17   Karly Betancourt MD   pantoprazole (PROTONIX) 20 mg tablet TAKE 1 TABLET BY MOUTH TWICE DAILY 8/5/17   Karly Betancourt MD   furosemide (LASIX) 20 mg tablet TAKE 1 TABLET BY MOUTH EVERY DAY 8/5/17   Karly Betancourt MD   nystatin-triamcinolone [facility]) topical cream APPLY EXTERNALLY TO THE AFFECTED AREA TWICE DAILY 8/5/17   Karly Betancourt MD   ammonium lactate (AMLACTIN) 12 % topical cream APPLY TO THE AFFECTED AREA TWICE DAILY. RUB IN WELL 8/5/17   Karly Betancourt MD   risperiDONE (RISPERDAL) 1 mg tablet TAKE 1 TABLET BY MOUTH TWICE DAILY 10/10/16   Karly Betancourt MD   Adhesive Bandage [facility] MEPILEX BORDER) 6 X 8 \" bndg 1 Each by Apply Externally route daily. One, T14.8, 2x4cm open area with bloody exudate 9/27/16   Jose Gramajo MD   traMADol (ULTRAM) 50 mg tablet Take 50 mg by mouth every six (6) hours as needed for Pain. Historical Provider   diclofenac (VOLTAREN) 1 % gel Apply  to affected area four (4) times daily.  8/27/16   Karly Betancourt MD   mupirocin calcium (BACTROBAN) 2 % topical cream APPLY EXTERNALLY TO THE AFFECTED AREA EVERY DAY AS DIRECTED 5/13/16   Kisha Lopez MD   hydrOXYzine (VISTARIL) 25 mg capsule TAKE 1 CAPSULE BY MOUTH THREE TIMES DAILY AS NEEDED FOR ITCHING 4/11/16   Karly Betancourt MD   fexofenadine Krishna Bowles) 180 mg tablet Take 1 Tab by mouth daily. 8/31/15   Neha Tee MD   fluticasone (FLONASE) 50 mcg/actuation nasal spray 2 Sprays by Both Nostrils route daily. 12/5/13   Snehal Ferreira MD   fenofibrate nanocrystallized (TRICOR) 145 mg tablet TAKE 1 TABLET BY MOUTH DAILY 2/24/13   Snehal Ferreira MD     Allergies   Allergen Reactions    Bactrim [Sulfamethoprim Ds] Other (comments)     Nausea, abdominal pain and cramping      Byetta [Exenatide] Rash        Review of Systems:  A comprehensive review of systems was negative except for that written in the History of Present Illness. Objective:     Physical Exam:     T: 98.0 P: 80  RR: 22  BP: 118/68   General: well developed, well nourished, pleasant , NAD. Hygiene good  Psych: cooperative. Pleasant. No anxiety or depression. Normal mood and affect. Neuro: alert and oriented to person/place/situation. Evidence of Prader-Willi syndrome, otherwise nonfocal.  HEENT: Normocephalic, atraumatic. EOMI. Conjunctiva clear. No scleral icterus. Neck: Normal range of motion. No masses. Chest: Good air entry bilaterally. Respirations nonlabored  Abdomen: Soft, nontender, nondistended, normoactive bowel sounds  Extremities: Edematous (2+); evidence of Prader-Willi syndrome    Ulcer Description:   Etiology: pressure/friction  Location: lower abdomen  Measurement: 3.2 x 2.2 x 0.2 cm  Ulcer bed: Granular/Healthy    Periwound: Reddened and warm to touch  Exudate: Scant/small amount Serous exudate    Data Review:   No results found for this or any previous visit (from the past 24 hour(s)). Assessment:     28 y.o. female with lower abdomen pressure/friction ulcer. Ulcer appears to be infected by the warmth and erythema. Wound cleansed with saline and ulcer cultured. Plan:     Dressing: Aquacel Ag  Frequency: every other day    Prescription for lidocaine given for use before dressing changes. Reminded pt and mother to continue tramadol as needed for discomfort.  Pt to continue Keflex QID as prescribed in ED. Will follow culture results and change antibiotics if needed. Advised pt and mother of signs/symptoms of increased infection and to call office or seek urgent care. Patient and mother understood and agree with plan. Questions answered. Weekly visits and serial debridements also discussed.   Follow up with Dr. Maria Elena Levin in 1 week    Signed By: Fernanda Silva NP     August 21, 2017

## 2017-08-22 ENCOUNTER — OFFICE VISIT (OUTPATIENT)
Dept: FAMILY MEDICINE CLINIC | Age: 32
End: 2017-08-22

## 2017-08-22 ENCOUNTER — TELEPHONE (OUTPATIENT)
Dept: FAMILY MEDICINE CLINIC | Age: 32
End: 2017-08-22

## 2017-08-22 VITALS
OXYGEN SATURATION: 87 % | WEIGHT: 293 LBS | BODY MASS INDEX: 67.81 KG/M2 | HEART RATE: 108 BPM | TEMPERATURE: 96.1 F | RESPIRATION RATE: 20 BRPM | HEIGHT: 55 IN

## 2017-08-22 DIAGNOSIS — E65 ABDOMINAL PANNUS: ICD-10-CM

## 2017-08-22 DIAGNOSIS — L24.A9 WOUND DRAINAGE: ICD-10-CM

## 2017-08-22 DIAGNOSIS — Q87.11 PRADER-WILLI SYNDROME: ICD-10-CM

## 2017-08-22 DIAGNOSIS — L03.311 CELLULITIS, ABDOMINAL WALL: Primary | ICD-10-CM

## 2017-08-22 RX ORDER — TRAMADOL HYDROCHLORIDE 50 MG/1
50 TABLET ORAL
Status: CANCELLED | OUTPATIENT
Start: 2017-08-22

## 2017-08-22 RX ORDER — TRAMADOL HYDROCHLORIDE 50 MG/1
50 TABLET ORAL
Qty: 30 TAB | Refills: 0 | Status: SHIPPED | OUTPATIENT
Start: 2017-08-22 | End: 2018-01-22

## 2017-08-22 NOTE — MR AVS SNAPSHOT
Visit Information Date & Time Provider Department Dept. Phone Encounter #  
 8/22/2017  1:30 PM Anna Balderrama MD [address] [facility] [phone] [id number] Your Appointments 8/29/2017  1:45 PM  
ROUTINE CARE with Mykel Escobar MD  
[facility] [address] [place] [address] Road) Appt Note: ER f/u from [facility] for cellulitis [address] [place] [address]  
[phone]  
  
   
 [address] [place] [address] Mihir Martinez [address] Upcoming Health Maintenance Date Due  
 EYE EXAM RETINAL OR DILATED Q1 4/21/2017 INFLUENZA AGE 9 TO ADULT 8/1/2017 HEMOGLOBIN A1C Q6M 12/13/2017 FOOT EXAM Q1 3/24/2018 MICROALBUMIN Q1 6/13/2018 LIPID PANEL Q1 6/13/2018 DTaP/Tdap/Td series (2 - Td) 9/1/2026 Allergies as of 8/22/2017  Review Complete On: 8/22/2017 By: Tiffany Bates LPN Severity Noted Reaction Type Reactions Bactrim [Sulfamethoprim Ds]  11/09/2015    Other (comments) Nausea, abdominal pain and cramping Byetta [Exenatide]  10/21/2010    Rash Current Immunizations  Reviewed on 11/9/2015 Name Date Influenza Vaccine 10/20/2015, 10/8/2013 Influenza Vaccine (Quad) PF 9/8/2016 Influenza Vaccine PF 9/14/2012 Pneumococcal Conjugate (PCV-13) 5/5/2015 Pneumococcal Vaccine (Unspecified Type) 2/2/2011 TD Vaccine 12/2/2010 Not reviewed this visit You Were Diagnosed With   
  
 Codes Comments Cellulitis, abdominal wall    -  Primary ICD-10-CM: Q41.932 ICD-9-CM: 453. 2 Wound drainage     ICD-10-CM: T14.8 ICD-9-CM: 879.8 Abdominal pannus     ICD-10-CM: G65 
ICD-9-CM: 278.1 Prader-Willi syndrome     ICD-10-CM: Q87.1 ICD-9-CM: 759.81 Vitals Pulse Temp Resp Height(growth percentile) Weight(growth percentile) SpO2  
 (!) 108 96.1 °F (35.6 °C) (Oral) 20 4' 7\" (1.397 m) 304 lb (137.9 kg) (!) 87% BMI OB Status Smoking Status 70.66 kg/m2 Needs review Never Smoker BMI and BSA Data Body Mass Index Body Surface Area  
 70.66 kg/m 2 2.31 m 2 Preferred Pharmacy Pharmacy Name Phone [facility] [address], [address] [place], [place] [address]y [address]  [facility] [phone] Your Updated Medication List  
  
   
This list is accurate as of: 8/22/17  2:12 PM.  Always use your most recent med list.  
  
  
  
  
 Adhesive Bandage 6 X 8 \" Bndg Commonly known as:  Tendra Mepilex Border 1 Each by Apply Externally route daily. One, T14.8, 2x4cm open area with bloody exudate ALPRAZolam 0.25 mg tablet Commonly known as:  Will Marcus Take 1 Tab by mouth three (3) times daily as needed for Anxiety. Max Daily Amount: 0.75 mg.  
  
 ammonium lactate 12 % topical cream  
Commonly known as:  AMLACTIN  
APPLY TO THE AFFECTED AREA TWICE DAILY. RUB IN WELL  
  
 cephALEXin 500 mg capsule Commonly known as:  Bety Rumple Take 1 Cap by mouth four (4) times daily for 7 days. diclofenac 1 % Gel Commonly known as:  VOLTAREN Apply  to affected area four (4) times daily. fenofibrate nanocrystallized 145 mg tablet Commonly known as:  TRICOR  
TAKE 1 TABLET BY MOUTH DAILY  
  
 fexofenadine 180 mg tablet Commonly known as:  Moshe Shanna Take 1 Tab by mouth daily. fluticasone 50 mcg/actuation nasal spray Commonly known as:  [place] Gaetano 2 Sprays by Both Nostrils route daily. furosemide 20 mg tablet Commonly known as:  LASIX TAKE 1 TABLET BY MOUTH EVERY DAY  
  
 hydrOXYzine pamoate 25 mg capsule Commonly known as:  VISTARIL  
TAKE 1 CAPSULE BY MOUTH THREE TIMES DAILY AS NEEDED FOR ITCHING  
  
 insulin glargine 100 unit/mL (3 mL) Inpn Commonly known as:  LANTUS,BASAGLAR  
by SubCUTAneous route two (2) times a day.  80mg am and 80mg pm.  
  
 mupirocin calcium 2 % topical cream  
Commonly known as:  BACTROBAN  
APPLY EXTERNALLY TO THE AFFECTED AREA EVERY DAY AS DIRECTED  
  
 naftifine 1 % topical cream  
 Commonly known as:  NAFTIN  
APPLY EXTERNALLY TO THE AFFECTED AREA EVERY DAY FOR 2 WEEKS OR AS DIRECTED NovoLOG Flexpen 100 unit/mL Inpn Generic drug:  insulin aspart  
by SubCUTAneous route Before breakfast, lunch, dinner and at bedtime. Sliding scale  
  
 nystatin-triamcinolone topical cream  
Commonly known as:  MYCOLOG II  
APPLY EXTERNALLY TO THE AFFECTED AREA TWICE DAILY pantoprazole 20 mg tablet Commonly known as:  PROTONIX  
TAKE 1 TABLET BY MOUTH TWICE DAILY  
  
 risperiDONE 1 mg tablet Commonly known as:  RisperDAL TAKE 1 TABLET BY MOUTH TWICE DAILY  
  
 traMADol 50 mg tablet Commonly known as:  ULTRAM  
Take 1 Tab by mouth every six (6) hours as needed for Pain. Max Daily Amount: 200 mg. Prescriptions Printed Refills  
 traMADol (ULTRAM) 50 mg tablet 0 Sig: Take 1 Tab by mouth every six (6) hours as needed for Pain. Max Daily Amount: 200 mg. Class: Print Route: Oral  
  
To-Do List   
 08/28/2017 1:15 PM  
  Appointment with Valentino Hillock., MD; [address] WOUND at [facility] ([phone]) Patient Instructions Your Instructions: 
· Continue Keflex. Continue to follow up with the wound care clinic as directed. Call the wound care clinic immediately if there are any changes. · If Clorinda Dallas starts to develop any other signs or symptoms (fever, nausea/vomitting, lightheaded, difficult to arouse), present to the Emergency Department immediately. Cellulitis: Care Instructions Your Care Instructions Cellulitis is a skin infection. It often occurs after a break in the skin from a scrape, cut, bite, or puncture, or after a rash. The doctor has checked you carefully, but problems can develop later. If you notice any problems or new symptoms, get medical treatment right away. Follow-up care is a key part of your treatment and safety.  Be sure to make and go to all appointments, and call your doctor if you are having problems. It's also a good idea to know your test results and keep a list of the medicines you take. How can you care for yourself at home? · Take your antibiotics as directed. Do not stop taking them just because you feel better. You need to take the full course of antibiotics. · Prop up the infected area on pillows to reduce pain and swelling. Try to keep the area above the level of your heart as often as you can. · If your doctor told you how to care for your wound, follow your doctor's instructions. If you did not get instructions, follow this general advice: ¨ Wash the wound with clean water 2 times a day. Don't use hydrogen peroxide or alcohol, which can slow healing. ¨ You may cover the wound with a thin layer of petroleum jelly, such as Vaseline, and a nonstick bandage. ¨ Apply more petroleum jelly and replace the bandage as needed. · Be safe with medicines. Take pain medicines exactly as directed. ¨ If the doctor gave you a prescription medicine for pain, take it as prescribed. ¨ If you are not taking a prescription pain medicine, ask your doctor if you can take an over-the-counter medicine. To prevent cellulitis in the future · Try to prevent cuts, scrapes, or other injuries to your skin. Cellulitis most often occurs where there is a break in the skin. · If you get a scrape, cut, mild burn, or bite, wash the wound with clean water as soon as you can to help avoid infection. Don't use hydrogen peroxide or alcohol, which can slow healing. · If you have swelling in your legs (edema), support stockings and good skin care may help prevent leg sores and cellulitis. · Take care of your feet, especially if you have diabetes or other conditions that increase the risk of infection. Wear shoes and socks. Do not go barefoot. If you have athlete's foot or other skin problems on your feet, talk to your doctor about how to treat them. When should you call for help? Call your doctor now or seek immediate medical care if: 
· You have signs that your infection is getting worse, such as: 
¨ Increased pain, swelling, warmth, or redness. ¨ Red streaks leading from the area. ¨ Pus draining from the area. ¨ A fever. · You get a rash. Watch closely for changes in your health, and be sure to contact your doctor if: 
· You are not getting better after 1 day (24 hours). · You do not get better as expected. Where can you learn more? Go to http://darius-gricelda.info/. Miya Francisco in the search box to learn more about \"Cellulitis: Care Instructions. \" Current as of: October 13, 2016 Content Version: 11.3 © 4381-0811 [employer]. Care instructions adapted under license by [employer] (which disclaims liability or warranty for this information). If you have questions about a medical condition or this instruction, always ask your healthcare professional. [place] [address] any warranty or liability for your use of this information. Introducing [facility]! [facility] introduces VisTracks patient portal. Now you can access parts of your medical record, email your doctor's office, and request medication refills online. 1. In your internet browser, go to [email or web address] 2. Click on the First Time User? Click Here link in the Sign In box. You will see the New Member Sign Up page. 3. Enter your VisTracks Access Code exactly as it appears below. You will not need to use this code after youve completed the sign-up process. If you do not sign up before the expiration date, you must request a new code. · VisTracks Access Code: [email or web address] Expires: 11/16/2017 11:48 AM 
 
4. Enter the last four digits of your Social Security Number (xxxx) and Date of Birth (mm/dd/yyyy) as indicated and click Submit. You will be taken to the next sign-up page. 5. Create a Doctor kinetic ID. This will be your Doctor kinetic login ID and cannot be changed, so think of one that is secure and easy to remember. 6. Create a Doctor kinetic password. You can change your password at any time. 7. Enter your Password Reset Question and Answer. This can be used at a later time if you forget your password. 8. Enter your e-mail address. You will receive e-mail notification when new information is available in [address] 9. Click Sign Up. You can now view and download portions of your medical record. 10. Click the Download Summary menu link to download a portable copy of your medical information. If you have questions, please visit the Frequently Asked Questions section of the Doctor kinetic website. Remember, Doctor kinetic is NOT to be used for urgent needs. For medical emergencies, dial 911. Now available from your iPhone and Android! Please provide this summary of care documentation to your next provider. Your primary care clinician is listed as Dionicio Waller. If you have any questions after today's visit, please call [phone].

## 2017-08-22 NOTE — PROGRESS NOTES
[address] [place] [address] with [facility] and [facility]     Chief Complaint: [place] Freeze have some more Tramadol? \"    Subjective  Prakash Godfrey is an 28 y.o. female with a known history of morbid obesity and Prader-Willi Syndrome who presents to follow up on abdominal wall cellulitis and a non-healing wound. The patient was seen for this issue on 8/18 in the ED--referred from there to an outpatient wound care clinic and started on Keflex. Per her mother (joao's primary caretaker), Karen Vyas gets these wounds on her lower abdomen near where her pannus meets her waist.  She has not had any issue with this since she presented to the ED. She had her first appointment with wound care clinic yesterday--cultures were taken and wound care was provided. The patient reports that she is still having pain on the wound. Mom reports that tramadol (prescribed to her in 2014) is the only thing that has helped. She is asking for a refill of this medication as she is about to run out. There is no seizure history. Allergies - reviewed: Allergies   Allergen Reactions    Bactrim [Sulfamethoprim Ds] Other (comments)     Nausea, abdominal pain and cramping      Byetta [Exenatide] Rash       Medications - reviewed:   Current Outpatient Prescriptions   Medication Sig    traMADol (ULTRAM) 50 mg tablet Take 1 Tab by mouth every six (6) hours as needed for Pain. Max Daily Amount: 200 mg.  cephALEXin (KEFLEX) 500 mg capsule Take 1 Cap by mouth four (4) times daily for 7 days.  ALPRAZolam (XANAX) 0.25 mg tablet Take 1 Tab by mouth three (3) times daily as needed for Anxiety.  Max Daily Amount: 0.75 mg.    naftifine (NAFTIN) 1 % topical cream APPLY EXTERNALLY TO THE AFFECTED AREA EVERY DAY FOR 2 WEEKS OR AS DIRECTED    pantoprazole (PROTONIX) 20 mg tablet TAKE 1 TABLET BY MOUTH TWICE DAILY    furosemide (LASIX) 20 mg tablet TAKE 1 TABLET BY MOUTH EVERY DAY    ammonium lactate (AMLACTIN) 12 % topical cream APPLY TO THE AFFECTED AREA TWICE DAILY. RUB IN WELL    risperiDONE (RISPERDAL) 1 mg tablet TAKE 1 TABLET BY MOUTH TWICE DAILY    Adhesive Bandage (TENDRA MEPILEX BORDER) 6 X 8 \" bndg 1 Each by Apply Externally route daily. One, T14.8, 2x4cm open area with bloody exudate    mupirocin calcium (BACTROBAN) 2 % topical cream APPLY EXTERNALLY TO THE AFFECTED AREA EVERY DAY AS DIRECTED    fenofibrate nanocrystallized (TRICOR) 145 mg tablet TAKE 1 TABLET BY MOUTH DAILY    insulin glargine (LANTUS,BASAGLAR) 100 unit/mL (3 mL) inpn by SubCUTAneous route two (2) times a day. 80mg am and 80mg pm.    insulin aspart (NOVOLOG FLEXPEN) 100 unit/mL inpn by SubCUTAneous route Before breakfast, lunch, dinner and at bedtime. Sliding scale    nystatin-triamcinolone (MYCOLOG II) topical cream APPLY EXTERNALLY TO THE AFFECTED AREA TWICE DAILY    diclofenac (VOLTAREN) 1 % gel Apply  to affected area four (4) times daily.  hydrOXYzine (VISTARIL) 25 mg capsule TAKE 1 CAPSULE BY MOUTH THREE TIMES DAILY AS NEEDED FOR ITCHING    fexofenadine (ALLEGRA) 180 mg tablet Take 1 Tab by mouth daily.  fluticasone (FLONASE) 50 mcg/actuation nasal spray 2 Sprays by Both Nostrils route daily. No current facility-administered medications for this visit.       Facility-Administered Medications Ordered in Other Visits   Medication Dose Route Frequency    lidocaine (XYLOCAINE) 4 % (40 mg/mL) topical solution   Topical PRN       I have reviewed and updated the histories as listed below:    Past Medical History - reviewed:  Past Medical History:   Diagnosis Date    Anxiety disorder     Severe    Congestive heart failure, unspecified     Diabetes ([facility] [address]     Hypertension     Lymph edema     Obesity     Prader - Willi syndrome          Past Surgical History - reviewed:   Past Surgical History:   Procedure Laterality Date    HX CHOLECYSTECTOMY  age 25   [de-identified] TONSIL AND ADENOIDECTOMY  age 10    HX WISDOM TEETH EXTRACTION 10/1/13         Social History - reviewed:  Social History     Social History    Marital status: SINGLE     Spouse name: N/A    Number of children: N/A    Years of education: N/A     Occupational History    Not on file. Social History Main Topics    Smoking status: Never Smoker    Smokeless tobacco: Never Used    Alcohol use No    Drug use: No    Sexual activity: No     Other Topics Concern    Not on file     Social History Narrative         Family History - reviewed:  Family History   Problem Relation Age of Onset    Hypertension Mother     Hypertension Father     MS Brother          Immunizations - reviewed:   Immunization History   Administered Date(s) Administered    Influenza Vaccine 10/08/2013, 10/20/2015    Influenza Vaccine (Quad) PF 09/08/2016    Influenza Vaccine PF 09/14/2012    Pneumococcal Conjugate (PCV-13) 05/05/2015    Pneumococcal Vaccine (Unspecified Type) 02/02/2011    TD Vaccine 12/02/2010     Review of Systems  Review of Systems   Unable to perform ROS: Other   Mental Retardation. Physical Exam    Visit Vitals    Pulse (!) 108    Temp 96.1 °F (35.6 °C) (Oral)    Resp 20    Ht 4' 7\" (1.397 m)    Wt 304 lb (137.9 kg)    SpO2 (!) 87%    BMI 70.66 kg/m2       Physical Exam   Constitutional:   Morbibidly obese. Patient was able to walk herself into clinic today. HENT:   Head: Normocephalic. Neck: Normal range of motion. Cardiovascular: Normal rate, regular rhythm and intact distal pulses. Exam reveals no gallop and no friction rub. No murmur heard. Pulmonary/Chest: Effort normal and breath sounds normal. No respiratory distress. She has no wheezes. She has no rales. She exhibits no tenderness. Abdominal: There is no tenderness. Very large panus. Skin: Skin is warm and dry. 3cm x 3cm wound on lower abdomen--at fold of pannus. Non healing. Some granulation tissue present. No necrotic tissue present. No signs of purulence.    Vitals reviewed. I have personally reviewed old records including notes from ED visit on 8/18 and previous visits to our clinic. Assessment    ICD-10-CM ICD-9-CM    1. Cellulitis, abdominal wall L03.311 682.2 traMADol (ULTRAM) 50 mg tablet   2. Wound drainage T14.8 879.8 traMADol (ULTRAM) 50 mg tablet   3. Abdominal pannus E65 278.1 traMADol (ULTRAM) 50 mg tablet   4. Prader-Willi syndrome Q87.1 759.81      Plan  1. Cellulitis, abdominal wall - encouraged to continue Keflex until completion of the abx course. If there are any changes to the wound, mother is aware to call wound care clinic immediately. If the patient has any changes such as fever, nausea, sob, chest pain, or any other systematic symptom--she is to be brought to the emergency department immediately as patient is at risk of severe sepsis with this infection. I provided 30 tramadol today. - traMADol (ULTRAM) 50 mg tablet; Take 1 Tab by mouth every six (6) hours as needed for Pain. Max Daily Amount: 200 mg. Dispense: 30 Tab; Refill: 0    Follow-up Disposition:  Return in about 1 week (around 8/29/2017) for follow up appointment with Dr. Everette Michaels. .    I have discussed the diagnosis with the patient and the intended plan as seen in the above orders. Patient verbalized understanding of the plan and agrees with the plan. The patient has received an after-visit summary and questions were answered concerning future plans. I have discussed medication side effects and warnings with the patient as well. Informed patient to return to the office if new symptoms arise. Patient was discussed with Dr. Shaylee Goode.     Nechama Hammans, MD  Family Medicine Resident

## 2017-08-22 NOTE — PATIENT INSTRUCTIONS
Your Instructions:  · Continue Keflex. Continue to follow up with the wound care clinic as directed. Call the wound care clinic immediately if there are any changes. · If Tomas Pereira starts to develop any other signs or symptoms (fever, nausea/vomitting, lightheaded, difficult to arouse), present to the Emergency Department immediately. Cellulitis: Care Instructions  Your Care Instructions    Cellulitis is a skin infection. It often occurs after a break in the skin from a scrape, cut, bite, or puncture, or after a rash. The doctor has checked you carefully, but problems can develop later. If you notice any problems or new symptoms, get medical treatment right away. Follow-up care is a key part of your treatment and safety. Be sure to make and go to all appointments, and call your doctor if you are having problems. It's also a good idea to know your test results and keep a list of the medicines you take. How can you care for yourself at home? · Take your antibiotics as directed. Do not stop taking them just because you feel better. You need to take the full course of antibiotics. · Prop up the infected area on pillows to reduce pain and swelling. Try to keep the area above the level of your heart as often as you can. · If your doctor told you how to care for your wound, follow your doctor's instructions. If you did not get instructions, follow this general advice:  ¨ Wash the wound with clean water 2 times a day. Don't use hydrogen peroxide or alcohol, which can slow healing. ¨ You may cover the wound with a thin layer of petroleum jelly, such as Vaseline, and a nonstick bandage. ¨ Apply more petroleum jelly and replace the bandage as needed. · Be safe with medicines. Take pain medicines exactly as directed. ¨ If the doctor gave you a prescription medicine for pain, take it as prescribed.   ¨ If you are not taking a prescription pain medicine, ask your doctor if you can take an over-the-counter medicine. To prevent cellulitis in the future  · Try to prevent cuts, scrapes, or other injuries to your skin. Cellulitis most often occurs where there is a break in the skin. · If you get a scrape, cut, mild burn, or bite, wash the wound with clean water as soon as you can to help avoid infection. Don't use hydrogen peroxide or alcohol, which can slow healing. · If you have swelling in your legs (edema), support stockings and good skin care may help prevent leg sores and cellulitis. · Take care of your feet, especially if you have diabetes or other conditions that increase the risk of infection. Wear shoes and socks. Do not go barefoot. If you have athlete's foot or other skin problems on your feet, talk to your doctor about how to treat them. When should you call for help? Call your doctor now or seek immediate medical care if:  · You have signs that your infection is getting worse, such as:  ¨ Increased pain, swelling, warmth, or redness. ¨ Red streaks leading from the area. ¨ Pus draining from the area. ¨ A fever. · You get a rash. Watch closely for changes in your health, and be sure to contact your doctor if:  · You are not getting better after 1 day (24 hours). · You do not get better as expected. Where can you learn more? Go to http://darius-gricelda.info/. Fei Chavez in the search box to learn more about \"Cellulitis: Care Instructions. \"  Current as of: October 13, 2016  Content Version: 11.3  © 7853-1129 [employer]. Care instructions adapted under license by [employer] (which disclaims liability or warranty for this information). If you have questions about a medical condition or this instruction, always ask your healthcare professional. [address] any warranty or liability for your use of this information.

## 2017-08-22 NOTE — TELEPHONE ENCOUNTER
Patient needs a refill of the following  Requested Prescriptions     Pending Prescriptions Disp Refills    traMADol (ULTRAM) 50 mg tablet       Sig: Take 1 Tab by mouth every six (6) hours as needed for Pain. Max Daily Amount: 200 mg.

## 2017-08-22 NOTE — TELEPHONE ENCOUNTER
Returned Ms. Funez call, left message to call office. Dr. Johnnie Schroeder prescribed Tramadol for patient at [facility] ER March 2012. Patient will need to be seen for Tramadol medication refill.

## 2017-08-22 NOTE — PROGRESS NOTES
Chief Complaint   Patient presents with    Medication Refill     Tramadol     1. Have you been to the ER, urgent care clinic since your last visit? Hospitalized since your last visit? Yes. [facility]. 2. Have you seen or consulted any other health care providers outside of the [address] [place] [address] since your last visit? Include any pap smears or colon screening.  No

## 2017-08-22 NOTE — TELEPHONE ENCOUNTER
[phone]  Unique Dunn, mother, called to say the patient is being treated by the [facility] [address]. Stated a medication was prescribed in March 2012 for tramadol and this medication was approved by Dr. Zhu [facility] office. They told her to continue taking this medication. Need some medication to hold the patient over until the appointment of next Tuesday with Dr. Jacob Candelaria. Please call to discuss as she is stating there was a resident physician at this office who prescribed this medication back in 2012  She is saying there is no phone on the bottle of the provider who dispensed this medication from this office. Said the name on the bottle is [employer], prescribed this medication on 3/3/12. Please call.     View recent appointment of NO SHOW:  August 18, 2017  01:30 PM   NO SHOW  [facility]-MAIN OFFICE [email or web address]  SameDayAppt, 15 min    08/18/2017 [id number]: skin issue abby Bailey scheduled lsp 8/18/17

## 2017-08-28 ENCOUNTER — HOSPITAL ENCOUNTER (OUTPATIENT)
Dept: WOUND CARE | Age: 32
Discharge: HOME | End: 2017-08-28
Payer: MEDICARE

## 2017-08-28 PROCEDURE — 11042 DBRDMT SUBQ TIS 1ST 20SQCM/<: CPT

## 2017-08-28 RX ORDER — LIDOCAINE 40 MG/G
CREAM TOPICAL AS NEEDED
Status: DISCONTINUED | OUTPATIENT
Start: 2017-08-28 | End: 2017-08-31 | Stop reason: HOSPADM

## 2017-08-28 RX ADMIN — LIDOCAINE 1 TUBE: 40 CREAM TOPICAL at 13:44

## 2017-08-28 NOTE — PROGRESS NOTES
Chief Complaint (CC): wound to abdomen is not healing. Present Illness (PI): developed this sore place in a skin fold of the abdomen a week or two ago. Past History (PMedHx): Note extensive Hx with notable lymphedema, obesity, sickle cell disease. Medications and Allergies: as per today's data for this patient in '[facility]'. I have reviewed and concur. Illnesses: as per '[facility]' data noted today. Surgeries and Injuries: as per '[facility]' data noted today. Review of Systems (ROS):                        Integumentary: Other than as noted in 'PI'; skin hair and nails normal for age, with no new rash, lumps, bumps, eruptions or bleeding. Lymph: no new prominent nodes or drainage near lymph nodes. Bones, Joints, and Muscles: Other than as noted in 'PI' no new fractures, dislocations, weakness or pain. .                        Hematopoietic: no new bleeding or bruising or anemia changes. Mother explains that the bruises that I see today seem to come from her 'going down the slide' at the swimming pool. Eyes: no recent trauma or inflammation. no. Eye glasses. has. Intra Occular Lens Implants (IOLI)                        Ears: Hearing is unchanged and usually good. Nose: no new drainage, rhinorhea or epistaxis. Mouth, and throat: no soreness, drainage or lesions. no. Dentures. Neck: no new masses, drainage or pain                        Respiratory; no hemoptysis, current shortness of breath or pain with breath. Cardiovascular: No chest pain, palpitation or tachycardia. .                        Gastrointestinal: no recent change in appetite, stools or food tolerance.  No jaundice, hematemesis, vomiting or diarrhea                        Genito-Urinary: urine color, frequency, sensation unchanged Neurologic: no syncope, dizzyness or unusual sensations. Psychologic and Mental Status: no change in mood, sleep or memory recently     Social History: '[facility]' data today is noted. Lives at home, likes to go to the pool every day. Family History: '[facility]' data today is noted. Note the skin cancer hx. Physical Exam:      General:  alert anxious, affect of a child younger than stated age, speaks in short sentences. Head, Eyes, Ears, Nose and Throat: normocephalic, PERRLA, EOMI, IOLI-OU. Neck: supple without masses or adenopathy. Marked redundant skin folds. Chest: full excursion without deformity, redundant skin folds. .  Lungs: exspiratory wheezes, mild L>R  Heart: RSR,  No m. Abdomen: soft round many redundant skin folds. In the mid abdomen, mid line/frontal is an ulcer into the fatty tissue as noted in photos appearing to result from pressure against the fold below. Vascular:                         Pulses:                                            R                    L                                               Radial                        ++. ++.                                              Femoral                     .                 .                                              DP                             . .                                              PT                             .                 .  Extremities: multiple redundant folds c/w lymphedema upper and lower, no pitting, multiple scattered old bruises no wounds noted. Vital signs and data recorded in '[facility]' for this patient today is noted, reviewed and considered. Procedure Note     Name of Procedure: sharp debridement. PreOp diagnosis: pressure ulcer abdominal skin fold. Condition: anxious no real pain with debridement but after debridement seems to hurt with pulling back the skin for dressing.   Anaesthesia: topical lidocaine. Description: using a sharp curette I removed the surface exudate down to bleeding subq tissue throughout. .  Blood Loss: minimal.  Post Op Diagnosis: same. Follow Up Plan: begin daily clean, mupirocin and cover with aquacel ag dressings. Return 2 weeks unless worsened. Avoid swimming pool for now. .  Prognosis: should close in 16 weeks or sooner if we can get good clean/wound care going forward and avoid self irritation. Tana Valadez

## 2017-08-29 ENCOUNTER — OFFICE VISIT (OUTPATIENT)
Dept: FAMILY MEDICINE CLINIC | Age: 32
End: 2017-08-29

## 2017-08-29 VITALS
OXYGEN SATURATION: 95 % | BODY MASS INDEX: 67.81 KG/M2 | HEIGHT: 55 IN | WEIGHT: 293 LBS | HEART RATE: 116 BPM | RESPIRATION RATE: 22 BRPM | TEMPERATURE: 98.3 F

## 2017-08-29 DIAGNOSIS — Z23 ENCOUNTER FOR IMMUNIZATION: ICD-10-CM

## 2017-08-29 DIAGNOSIS — E08.628 DIABETES MELLITUS DUE TO UNDERLYING CONDITION WITH OTHER SKIN COMPLICATION, WITHOUT LONG-TERM CURRENT USE OF INSULIN (HCC): Primary | ICD-10-CM

## 2017-08-29 RX ORDER — SILVER SULFADIAZINE 10 G/1000G
CREAM TOPICAL DAILY
Qty: 85 G | Refills: 1 | Status: SHIPPED | OUTPATIENT
Start: 2017-08-29

## 2017-08-29 NOTE — PROGRESS NOTES
Greater than 50% of today's 15 minute visit was counseling or coordination of care for the following reasons:    See diagnoses and orders, see patient instructions        Mom want refill of silvadene which she has used in the past for wounds.   Not needed now, she wants some just in case    We discussed flu vaccine

## 2017-08-29 NOTE — PROGRESS NOTES
Chief Complaint   Patient presents with    Other     refill on silvadene cream    Immunization/Injection     Influenza Vaccine left deltoid per verbal order from MD

## 2017-09-07 RX ORDER — AMMONIUM LACTATE 12 G/100G
CREAM TOPICAL
Qty: 385 G | Refills: 0 | Status: SHIPPED | OUTPATIENT
Start: 2017-09-07 | End: 2017-10-05 | Stop reason: SDUPTHER

## 2017-09-07 RX ORDER — NYSTATIN AND TRIAMCINOLONE ACETONIDE 100000; 1 [USP'U]/G; MG/G
CREAM TOPICAL
Qty: 240 G | Refills: 0 | Status: SHIPPED | OUTPATIENT
Start: 2017-09-07 | End: 2017-10-05 | Stop reason: SDUPTHER

## 2017-09-07 RX ORDER — PANTOPRAZOLE SODIUM 20 MG/1
TABLET, DELAYED RELEASE ORAL
Qty: 60 TAB | Refills: 0 | Status: SHIPPED | OUTPATIENT
Start: 2017-09-07 | End: 2017-10-05 | Stop reason: SDUPTHER

## 2017-09-11 ENCOUNTER — HOSPITAL ENCOUNTER (OUTPATIENT)
Dept: WOUND CARE | Age: 32
Discharge: HOME | End: 2017-09-11
Payer: MEDICARE

## 2017-09-11 PROCEDURE — 11042 DBRDMT SUBQ TIS 1ST 20SQCM/<: CPT

## 2017-09-11 RX ORDER — LIDOCAINE HYDROCHLORIDE 20 MG/ML
JELLY TOPICAL ONCE
Status: COMPLETED | OUTPATIENT
Start: 2017-09-11 | End: 2017-09-11

## 2017-09-11 RX ADMIN — LIDOCAINE HYDROCHLORIDE 5 ML: 20 JELLY TOPICAL at 13:30

## 2017-09-11 NOTE — PROGRESS NOTES
I have noted, and reviewed today's data for this patient in [facility], and '[facility]' and concur with same. Patient notes today: I'm better, mom says its much better. Lesion/Wound on Presentation today: ulcer much smaller, clean,  Pink without surrounding erythema. Procedure:     Wound # low mid abdominal skin fold. .  Procedure name: sharp debridement subq. Anaesthesia: topical lidocaine. Description: using a sharp curette I removed the exudate adherent to granulation without pain, resulting in good bleeding base. .  Blood Loss: minimal.  Post Procedure Condition/ Diagnosis: good progress. Follow up plan today: may stop mupirocin, continue daily to every other day dressing and see in two weeks. Shelia Mittal

## 2017-09-13 ENCOUNTER — OFFICE VISIT (OUTPATIENT)
Dept: FAMILY MEDICINE CLINIC | Age: 32
End: 2017-09-13

## 2017-09-13 VITALS
WEIGHT: 293 LBS | RESPIRATION RATE: 20 BRPM | HEART RATE: 107 BPM | HEIGHT: 55 IN | BODY MASS INDEX: 67.81 KG/M2 | TEMPERATURE: 98.2 F | OXYGEN SATURATION: 95 %

## 2017-09-13 DIAGNOSIS — Q87.11 PRADER-WILLI SYNDROME: ICD-10-CM

## 2017-09-13 DIAGNOSIS — M79.671 RIGHT FOOT PAIN: Primary | ICD-10-CM

## 2017-09-13 DIAGNOSIS — I89.0 LYMPHEDEMA: ICD-10-CM

## 2017-09-13 PROBLEM — S92.901D CLOSED FRACTURE OF RIGHT FOOT WITH ROUTINE HEALING: Status: ACTIVE | Noted: 2017-09-13

## 2017-09-13 NOTE — PROGRESS NOTES
Chief Complaint   Patient presents with    Fall     Jerardo 09/10/2017 - In bathroom, Denies LOC, Head Injury or Hitting Head    Ankle Pain     Right Ankle     Foot Pain     Right Foot     1. Have you been to the ER, urgent care clinic since your last visit? Hospitalized since your last visit? No    2. Have you seen or consulted any other health care providers outside of the [facility] since your last visit? Include any pap smears or colon screening.  No

## 2017-09-13 NOTE — PROGRESS NOTES
Trey Harris is a 28 y.o. female      Issues discussed today include:        Signs and symptoms:  She slipped in BR and fell onto her right foot  Duration:  4days ago  Context:  Some bruising  Location:  Right foot  Quality:  ahces  Severity:  She is able to walk  Timing:  Bruising imoroving  Modifying factors: Mom used ice and this helped    Data reviewed or ordered today:  XR    Other problems include:  Patient Active Problem List   Diagnosis Code    Prader-Willi syndrome Q87.1    CHF (congestive heart failure) (formerly [facility]) I50.9    Lymphedema I89.0    Anxiety disorder F41.9    Scoliosis M41.9    DM (diabetes mellitus), type 2 ([facility] [address] E11.9    HTN (hypertension) I10    Hyperlipidemia E78.5    Sinusitis J32.9    Sleep apnea G47.30    Bleeding hemorrhoids K64.9    ARF (acute renal failure) (formerly [facility]) N17.9    CKD (chronic kidney disease) N18.9    Ingrown toenail without infection L60.0    Urinary incontinence R32    MR (mental retardation), moderate F71    Psychosis F29       Medications:  Current Outpatient Prescriptions   Medication Sig Dispense Refill    ammonium lactate (AMLACTIN) 12 % topical cream APPLY TO THE AFFECTED AREA TWICE A DAY RUB IN WELL 385 g 0    nystatin-triamcinolone (MYCOLOG II) topical cream APPLY EXTERNALLY TO THE AFFECTED AREA TWICE DAILY 240 g 0    pantoprazole (PROTONIX) 20 mg tablet TAKE 1 TABLET BY MOUTH TWICE DAILY 60 Tab 0    silver sulfADIAZINE (SILVADENE) 1 % topical cream Apply  to affected area daily. Apply daily as directed 85 g 1    insulin glargine (LANTUS,BASAGLAR) 100 unit/mL (3 mL) inpn by SubCUTAneous route two (2) times a day. 80mg am and 80mg pm.      insulin aspart (NOVOLOG FLEXPEN) 100 unit/mL inpn by SubCUTAneous route Before breakfast, lunch, dinner and at bedtime. Sliding scale      ALPRAZolam (XANAX) 0.25 mg tablet Take 1 Tab by mouth three (3) times daily as needed for Anxiety.  Max Daily Amount: 0.75 mg. 90 Tab 1    naftifine (NAFTIN) 1 % topical cream APPLY EXTERNALLY TO THE AFFECTED AREA EVERY DAY FOR 2 WEEKS OR AS DIRECTED 60 g 0    furosemide (LASIX) 20 mg tablet TAKE 1 TABLET BY MOUTH EVERY DAY 90 Tab 0    risperiDONE (RISPERDAL) 1 mg tablet TAKE 1 TABLET BY MOUTH TWICE DAILY (Patient taking differently: TAKE 1 TABLET BY IN THE MORNING AND 2 TABLETS AT BEDTIME) 60 Tab 0    diclofenac (VOLTAREN) 1 % gel Apply  to affected area four (4) times daily. 1 Each 11    mupirocin calcium (BACTROBAN) 2 % topical cream APPLY EXTERNALLY TO THE AFFECTED AREA EVERY DAY AS DIRECTED 30 g 0    hydrOXYzine (VISTARIL) 25 mg capsule TAKE 1 CAPSULE BY MOUTH THREE TIMES DAILY AS NEEDED FOR ITCHING 30 Cap 0    fexofenadine (ALLEGRA) 180 mg tablet Take 1 Tab by mouth daily. 30 Tab 11    fluticasone (FLONASE) 50 mcg/actuation nasal spray 2 Sprays by Both Nostrils route daily. 1 Bottle 6    fenofibrate nanocrystallized (TRICOR) 145 mg tablet TAKE 1 TABLET BY MOUTH DAILY 90 Tab 0    traMADol (ULTRAM) 50 mg tablet Take 1 Tab by mouth every six (6) hours as needed for Pain. Max Daily Amount: 200 mg. 30 Tab 0    Adhesive Bandage (TENDRA MEPILEX BORDER) 6 X 8 \" bndg 1 Each by Apply Externally route daily. One, T14.8, 2x4cm open area with bloody exudate 60 Each 12       Allergies: Allergies   Allergen Reactions    Bactrim [Sulfamethoprim Ds] Other (comments)     Nausea, abdominal pain and cramping      Byetta [Exenatide] Rash       LMP:  No LMP recorded. Social History     Social History    Marital status: SINGLE     Spouse name: N/A    Number of children: N/A    Years of education: N/A     Occupational History    Not on file.      Social History Main Topics    Smoking status: Never Smoker    Smokeless tobacco: Never Used    Alcohol use No    Drug use: No    Sexual activity: No     Other Topics Concern    Not on file     Social History Narrative         Family History   Problem Relation Age of Onset    Hypertension Mother     Hypertension Father     MS Brother Meaningful use:  done      ROS:  Fevers:  no  Falls:  yes  Other significant ROS:  Still getting wound care on skin    No LMP recorded. Physical Exam  Visit Vitals    Pulse (!) 107    Temp 98.2 °F (36.8 °C) (Oral)    Resp 20    Ht 4' 7\" (1.397 m)    Wt 307 lb 9.6 oz (139.5 kg)    SpO2 95%    BMI 71.49 kg/m2     BP Readings from Last 3 Encounters:   08/18/17 120/74   05/26/16 130/80   04/26/16 129/62     Constitutional:  Appears well,  No Acute Distress, Vitals noted  Psychiatric:   Affect normal, Alert and cooperative, Oriented to person/place/time    Extremities:  Baseline massive edema, good peripheral pulses  Skin:   Warm to palpation, without rashes, some bruising on lateral right foot  MSK:  Full ROM at all points of right foot and ankle      Assessment:    Patient Active Problem List   Diagnosis Code    Prader-Willi syndrome Q87.1    CHF (congestive heart failure) ([facility] I50.9    Lymphedema I89.0    Anxiety disorder F41.9    Scoliosis M41.9    DM (diabetes mellitus), type 2 ([facility] [address] E11.9    HTN (hypertension) I10    Hyperlipidemia E78.5    Sinusitis J32.9    Sleep apnea G47.30    Bleeding hemorrhoids K64.9    ARF (acute renal failure) ([facility]) N17.9    CKD (chronic kidney disease) N18.9    Ingrown toenail without infection L60.0    Urinary incontinence R32    MR (mental retardation), moderate F71    Psychosis F29       Today's diagnoses are:    ICD-10-CM ICD-9-CM    1. Right foot pain M79.671 729.5 XR FOOT RT MIN 3 V   2. Prader-Willi syndrome Q87.1 759.81 XR FOOT RT MIN 3 V   3. Lymphedema I89.0 457. 1 XR FOOT RT MIN 3 V       Plan:  Orders Placed This Encounter    XR FOOT RT MIN 3 V     Standing Status:   Future     Number of Occurrences:   1     Standing Expiration Date:   10/13/2018     Order Specific Question:   Reason for Exam     Answer:   fall     Order Specific Question:   Is Patient Allergic to Contrast Dye?      Answer:   Unknown     Order Specific Question:   Is Patient Pregnant?      Answer:   Unknown       See patient instructions  Patient Instructions   Continue ice and use tylenol for pain    Prevent falls at home        refresh note:  done    AVS Printed:  done

## 2017-09-13 NOTE — MR AVS SNAPSHOT
Visit Information Date & Time Provider Department Dept. Phone Encounter #  
 9/13/2017  2:30 PM Gabe Cedillo MD [facility] [phone] [id number] Upcoming Health Maintenance Date Due  
 EYE EXAM RETINAL OR DILATED Q1 8/29/2018* HEMOGLOBIN A1C Q6M 12/13/2017 FOOT EXAM Q1 3/24/2018 MICROALBUMIN Q1 6/13/2018 LIPID PANEL Q1 6/13/2018 DTaP/Tdap/Td series (2 - Td) 9/1/2026 *Topic was postponed. The date shown is not the original due date. Allergies as of 9/13/2017  Review Complete On: 9/13/2017 By: Kacey Garber LPN Severity Noted Reaction Type Reactions Bactrim [Sulfamethoprim Ds]  11/09/2015    Other (comments) Nausea, abdominal pain and cramping Byetta [Exenatide]  10/21/2010    Rash Current Immunizations  Reviewed on 8/29/2017 Name Date Influenza Vaccine 10/20/2015, 10/8/2013 Influenza Vaccine (Quad) PF 8/29/2017, 9/8/2016 Influenza Vaccine PF 9/14/2012 Pneumococcal Conjugate (PCV-13) 5/5/2015 Pneumococcal Vaccine (Unspecified Type) 2/2/2011 TD Vaccine 12/2/2010 Not reviewed this visit You Were Diagnosed With   
  
 Codes Comments Right foot pain    -  Primary ICD-10-CM: X47.351 ICD-9-CM: 729.5 Prader-Willi syndrome     ICD-10-CM: Q87.1 ICD-9-CM: 759.81 Lymphedema     ICD-10-CM: I89.0 ICD-9-CM: 033.0 Vitals Pulse Temp Resp Height(growth percentile) Weight(growth percentile) SpO2  
 (!) 107 98.2 °F (36.8 °C) (Oral) 20 4' 7\" (1.397 m) 307 lb 9.6 oz (139.5 kg) 95% BMI OB Status Smoking Status 71.49 kg/m2 Having regular periods Never Smoker Vitals History BMI and BSA Data Body Mass Index Body Surface Area  
 71.49 kg/m 2 2.33 m 2 Preferred Pharmacy Pharmacy Name Phone [facility] [address] [id number] [facility] [address]  [facility] [phone] Your Updated Medication List  
  
   
 This list is accurate as of: 9/13/17  3:05 PM.  Always use your most recent med list.  
  
  
  
  
 Adhesive Bandage 6 X 8 \" Bndg Commonly known as:  Tendra Mepilex Border 1 Each by Apply Externally route daily. One, T14.8, 2x4cm open area with bloody exudate ALPRAZolam 0.25 mg tablet Commonly known as:  Aylin Angle Take 1 Tab by mouth three (3) times daily as needed for Anxiety. Max Daily Amount: 0.75 mg.  
  
 ammonium lactate 12 % topical cream  
Commonly known as:  AMLACTIN  
APPLY TO THE AFFECTED AREA TWICE A DAY RUB IN WELL  
  
 diclofenac 1 % Gel Commonly known as:  VOLTAREN Apply  to affected area four (4) times daily. fenofibrate nanocrystallized 145 mg tablet Commonly known as:  TRICOR  
TAKE 1 TABLET BY MOUTH DAILY  
  
 fexofenadine 180 mg tablet Commonly known as:  Brionna Mare Take 1 Tab by mouth daily. fluticasone 50 mcg/actuation nasal spray Commonly known as:  [employer] 2 Sprays by Both Nostrils route daily. furosemide 20 mg tablet Commonly known as:  LASIX TAKE 1 TABLET BY MOUTH EVERY DAY  
  
 hydrOXYzine pamoate 25 mg capsule Commonly known as:  VISTARIL  
TAKE 1 CAPSULE BY MOUTH THREE TIMES DAILY AS NEEDED FOR ITCHING  
  
 insulin glargine 100 unit/mL (3 mL) Inpn Commonly known as:  LANTUSBASAGLAR  
by SubCUTAneous route two (2) times a day. 80mg am and 80mg pm.  
  
 mupirocin calcium 2 % topical cream  
Commonly known as:  BACTROBAN  
APPLY EXTERNALLY TO THE AFFECTED AREA EVERY DAY AS DIRECTED  
  
 naftifine 1 % topical cream  
Commonly known as:  NAFTIN  
APPLY EXTERNALLY TO THE AFFECTED AREA EVERY DAY FOR 2 WEEKS OR AS DIRECTED NovoLOG Flexpen 100 unit/mL Inpn Generic drug:  insulin aspart  
by SubCUTAneous route Before breakfast, lunch, dinner and at bedtime. Sliding scale  
  
 nystatin-triamcinolone topical cream  
Commonly known as:  MYCOLOG II  
APPLY EXTERNALLY TO THE AFFECTED AREA TWICE DAILY pantoprazole 20 mg tablet Commonly known as:  PROTONIX  
TAKE 1 TABLET BY MOUTH TWICE DAILY  
  
 risperiDONE 1 mg tablet Commonly known as:  RisperDAL TAKE 1 TABLET BY MOUTH TWICE DAILY  
  
 silver sulfADIAZINE 1 % topical cream  
Commonly known as:  SILVADENE Apply  to affected area daily. Apply daily as directed  
  
 traMADol 50 mg tablet Commonly known as:  ULTRAM  
Take 1 Tab by mouth every six (6) hours as needed for Pain. Max Daily Amount: 200 mg. To-Do List   
 09/13/2017 Imaging:  XR FOOT RT MIN 3 V   
  
 09/25/2017 1:15 PM  
  Appointment with Valentino Hillock., MD; [address] WOUND at [facility] ([phone]) Patient Instructions Continue ice and use tylenol for pain Prevent falls at home Introducing [facility]! [address] introduces [employer] patient portal. Now you can access parts of your medical record, email your doctor's office, and request medication refills online. 1. In your internet browser, go to [email or web address] 2. Click on the First Time User? Click Here link in the Sign In box. You will see the New Member Sign Up page. 3. Enter your [employer] Access Code exactly as it appears below. You will not need to use this code after youve completed the sign-up process. If you do not sign up before the expiration date, you must request a new code. · [employer] Access Code: [email or web address] Expires: 11/16/2017 11:48 AM 
 
4. Enter the last four digits of your Social Security Number (xxxx) and Date of Birth (mm/dd/yyyy) as indicated and click Submit. You will be taken to the next sign-up page. 5. Create a [employer] ID. This will be your [employer] login ID and cannot be changed, so think of one that is secure and easy to remember. 6. Create a [employer] password. You can change your password at any time. 7. Enter your Password Reset Question and Answer. This can be used at a later time if you forget your password. 8. Enter your e-mail address. You will receive e-mail notification when new information is available in [address] 9. Click Sign Up. You can now view and download portions of your medical record. 10. Click the Download Summary menu link to download a portable copy of your medical information. If you have questions, please visit the Frequently Asked Questions section of the [employer] website. Remember, [employer] is NOT to be used for urgent needs. For medical emergencies, dial 911. Now available from your iPhone and Android! Please provide this summary of care documentation to your next provider. Your primary care clinician is listed as Shantel Reyes. If you have any questions after today's visit, please call [phone].

## 2017-09-14 ENCOUNTER — TELEPHONE (OUTPATIENT)
Dept: FAMILY MEDICINE CLINIC | Age: 32
End: 2017-09-14

## 2017-09-22 ENCOUNTER — OFFICE VISIT (OUTPATIENT)
Dept: FAMILY MEDICINE CLINIC | Age: 32
End: 2017-09-22

## 2017-09-22 VITALS
RESPIRATION RATE: 16 BRPM | TEMPERATURE: 96.4 F | HEIGHT: 55 IN | HEART RATE: 79 BPM | WEIGHT: 293 LBS | OXYGEN SATURATION: 95 % | BODY MASS INDEX: 67.81 KG/M2

## 2017-09-22 DIAGNOSIS — Q87.11 PRADER-WILLI SYNDROME: ICD-10-CM

## 2017-09-22 DIAGNOSIS — L03.311 CELLULITIS, ABDOMINAL WALL: ICD-10-CM

## 2017-09-22 DIAGNOSIS — L24.A9 WOUND DRAINAGE: ICD-10-CM

## 2017-09-22 DIAGNOSIS — F29 PSYCHOSIS, UNSPECIFIED PSYCHOSIS TYPE (HCC): ICD-10-CM

## 2017-09-22 DIAGNOSIS — R44.3 HALLUCINATIONS: ICD-10-CM

## 2017-09-22 DIAGNOSIS — S92.354D CLOSED NONDISPLACED FRACTURE OF FIFTH METATARSAL BONE OF RIGHT FOOT WITH ROUTINE HEALING, SUBSEQUENT ENCOUNTER: Primary | ICD-10-CM

## 2017-09-22 NOTE — PROGRESS NOTES
[address] [facility] with [address] [place] [address]     Chief Complaint: Jessie Nassar broke her foot. \"    Subjective  Trey Harris is an 28 y.o. female with a history of Prader-Willi syndrome, morbid obesity who presents today with her mother to follow up after a foot fracture. Nine days ago, Champ Tabor sustained a fall. She was evaluated by Dr. Rip Simpson at that time and was noted to have a non-displaced fifth metatarsal fracture by XR. She has since followed up with Dr. Mccoy--recommended conservative management and pain control (patient has script for Tramadol at home). She has been doing well since the visit with orthopedics. Has been able to walk (not wearing boot). Pain is well controlled. Has follow up scheduled with Dr. Bob Duran in 1 month. Mom also asks about the possibility of our clinic prescribing Risperdal for Champ Tabor. Recently, her psychiatrist is no longer covered by her insurance plan. Allergies - reviewed: Allergies   Allergen Reactions    Bactrim [Sulfamethoprim Ds] Other (comments)     Nausea, abdominal pain and cramping      Byetta [Exenatide] Rash       Medications - reviewed:   Current Outpatient Prescriptions   Medication Sig    ammonium lactate (AMLACTIN) 12 % topical cream APPLY TO THE AFFECTED AREA TWICE A DAY RUB IN WELL    pantoprazole (PROTONIX) 20 mg tablet TAKE 1 TABLET BY MOUTH TWICE DAILY    silver sulfADIAZINE (SILVADENE) 1 % topical cream Apply  to affected area daily. Apply daily as directed    traMADol (ULTRAM) 50 mg tablet Take 1 Tab by mouth every six (6) hours as needed for Pain. Max Daily Amount: 200 mg.    insulin glargine (LANTUS,BASAGLAR) 100 unit/mL (3 mL) inpn by SubCUTAneous route two (2) times a day. 80mg am and 80mg pm.    insulin aspart (NOVOLOG FLEXPEN) 100 unit/mL inpn by SubCUTAneous route Before breakfast, lunch, dinner and at bedtime.  Sliding scale    ALPRAZolam (XANAX) 0.25 mg tablet Take 1 Tab by mouth three (3) times daily as needed for Anxiety. Max Daily Amount: 0.75 mg.    naftifine (NAFTIN) 1 % topical cream APPLY EXTERNALLY TO THE AFFECTED AREA EVERY DAY FOR 2 WEEKS OR AS DIRECTED    furosemide (LASIX) 20 mg tablet TAKE 1 TABLET BY MOUTH EVERY DAY    risperiDONE (RISPERDAL) 1 mg tablet TAKE 1 TABLET BY MOUTH TWICE DAILY (Patient taking differently: TAKE 1 TABLET BY IN THE MORNING AND 2 TABLETS AT BEDTIME)    Adhesive Bandage (TENDRA MEPILEX BORDER) 6 X 8 \" bndg 1 Each by Apply Externally route daily. One, T14.8, 2x4cm open area with bloody exudate    diclofenac (VOLTAREN) 1 % gel Apply  to affected area four (4) times daily.  mupirocin calcium (BACTROBAN) 2 % topical cream APPLY EXTERNALLY TO THE AFFECTED AREA EVERY DAY AS DIRECTED    hydrOXYzine (VISTARIL) 25 mg capsule TAKE 1 CAPSULE BY MOUTH THREE TIMES DAILY AS NEEDED FOR ITCHING    fexofenadine (ALLEGRA) 180 mg tablet Take 1 Tab by mouth daily.  fluticasone (FLONASE) 50 mcg/actuation nasal spray 2 Sprays by Both Nostrils route daily.  fenofibrate nanocrystallized (TRICOR) 145 mg tablet TAKE 1 TABLET BY MOUTH DAILY    nystatin-triamcinolone (MYCOLOG II) topical cream APPLY EXTERNALLY TO THE AFFECTED AREA TWICE DAILY     No current facility-administered medications for this visit.         I have reviewed and updated the histories as listed below:    Past Medical History - reviewed:  Past Medical History:   Diagnosis Date    Anxiety disorder     Severe    Congestive heart failure, unspecified     Diabetes ([facility] [address]     Hypertension     Lymph edema     Obesity     Prader - Willi syndrome          Past Surgical History - reviewed:   Past Surgical History:   Procedure Laterality Date    HX CHOLECYSTECTOMY  age 25   [de-identified] TONSIL AND ADENOIDECTOMY  age 9   [de-identified] WISDOM TEETH EXTRACTION  10/1/13         Social History - reviewed:  Social History     Social History    Marital status: SINGLE     Spouse name: N/A    Number of children: N/A    Years of education: N/A     Occupational History    Not on file. Social History Main Topics    Smoking status: Never Smoker    Smokeless tobacco: Never Used    Alcohol use No    Drug use: No    Sexual activity: No     Other Topics Concern    Not on file     Social History Narrative         Family History - reviewed:  Family History   Problem Relation Age of Onset    Hypertension Mother     Hypertension Father     MS Brother          Immunizations - reviewed:   Immunization History   Administered Date(s) Administered    Influenza Vaccine 10/08/2013, 10/20/2015    Influenza Vaccine (Quad) PF 09/08/2016, 08/29/2017    Influenza Vaccine PF 09/14/2012    Pneumococcal Conjugate (PCV-13) 05/05/2015    Pneumococcal Vaccine (Unspecified Type) 02/02/2011    TD Vaccine 12/02/2010     Flu: Up to date. Has received this season. Review of Systems  Review of Systems   Unable to perform ROS: Psychiatric disorder     Physical Exam    Visit Vitals    Pulse 79    Temp 96.4 °F (35.8 °C) (Axillary)    Resp 16    Ht 4' 7\" (1.397 m)    Wt 301 lb (136.5 kg)    SpO2 95%    BMI 69.96 kg/m2       Physical Exam   Constitutional: She is oriented to person, place, and time. No distress. Patient is morbibdily obese. HENT:   Head: Normocephalic. Eyes: Conjunctivae are normal. Pupils are equal, round, and reactive to light. Neck: Normal range of motion. Neck supple. Cardiovascular: Normal rate, regular rhythm, normal heart sounds and intact distal pulses. Exam reveals no gallop and no friction rub. No murmur heard. Pulmonary/Chest: Effort normal and breath sounds normal. She has no wheezes. She has no rales. She exhibits no tenderness. Musculoskeletal:        Right ankle: Normal.        Left foot: There is bony tenderness (mild--on palpation.) and swelling (mild). There is normal range of motion, no tenderness, normal capillary refill, no crepitus, no deformity and no laceration.    Patient is able to walk in and out of clinic today. Lymphadenopathy:     She has no cervical adenopathy. Neurological: She is alert and oriented to person, place, and time. Skin: She is not diaphoretic. Well healing chronic wound on lower abdomen. Much improved from my previous exam.     I have personally reviewed right foot radiographs taken on 9/13/17--showing non-displaced fracture of distal fifth metatarsal metaphysis. Assessment    ICD-10-CM ICD-9-CM    1. Closed nondisplaced fracture of fifth metatarsal bone of right foot with routine healing, subsequent encounter S92.354D V54.19    2. Prader-Willi syndrome Q87.1 759.81 REFERRAL TO PSYCHIATRY   3. Hallucinations R44.3 780.1 REFERRAL TO PSYCHIATRY   4. Psychosis, unspecified psychosis type F29 298.9 REFERRAL TO PSYCHIATRY   5. Cellulitis, abdominal wall L03.311 682.2    6. Wound drainage T14.8 879.8      Plan  1. Closed nondisplaced fracture of fifth metatarsal bone of right foot with routine healing, subsequent encounter - continue to follow with orthopedics. Requested records from Dr. Sheila Roca. To follow up with him in 1 month. High risk situation if appropriate follow up is not secured. Has tramadol at home for pain control. 2. Prader-Willi syndrome - mom requesting a new psychiatrist due to insurance coverage reasons. Placing referral today. Mom will call insurance company for list of in network providers. I am not comfortable managing her Risperdal.    3. Hallucinations - see #2. 4. Psychosis, unspecified psychosis type - see #2. 5. Cellulitis, abdominal wall - with overlying chronic wound. Healing nicely today. I am very pleased about the progress that this has made. Continue wound care as directed. 6. Wound drainage - see #5. Follow-up Disposition:  Return if symptoms worsen or fail to improve. I have discussed the diagnosis with the patient and the intended plan as seen in the above orders.  Patient verbalized understanding of the plan and agrees with the plan. The patient has received an after-visit summary and questions were answered concerning future plans. I have discussed medication side effects and warnings with the patient as well. Informed patient to return to the office if new symptoms arise. Patient was seen and discussed with Dr. John Aguirre.     Dereck Sargent MD  Family Medicine Resident

## 2017-09-22 NOTE — PATIENT INSTRUCTIONS
Broken Foot: Care Instructions  Your Care Instructions    A broken foot, or foot fracture, is a break in one or more of the bones in your foot. It may happen because of a sports injury, a fall, or other accident. A compound, or open, fracture occurs when a bone breaks through the skin. A break that does not poke through the skin is a closed fracture. Your treatment depends on the location and type of break in your foot. You may need a splint, a cast, or an orthopedic shoe. Certain kinds of injuries may need surgery at some time. Whatever your treatment, you can ease symptoms and help your foot heal with care at home. You may need 6 to 8 weeks or more to fully heal.  You heal best when you take good care of yourself. Eat a variety of healthy foods, and don't smoke. Follow-up care is a key part of your treatment and safety. Be sure to make and go to all appointments, and call your doctor if you are having problems. It's also a good idea to know your test results and keep a list of the medicines you take. How can you care for yourself at home? · Be safe with medicines. Take pain medicines exactly as directed. ¨ If the doctor gave you a prescription medicine for pain, take it as prescribed. ¨ If you are not taking a prescription pain medicine, ask your doctor if you can take an over-the-counter medicine. · Leave the splint on until your follow-up appointment. Do not put any weight on the injured foot. If you were given crutches, use them as directed. · Put ice or a cold pack on your foot for 10 to 20 minutes at a time. Try to do this every 1 to 2 hours for the next 3 days (when you are awake) or until the swelling goes down. Put a thin cloth between the ice and your skin. · Prop up the sore foot on a pillow anytime you sit or lie down during the next 3 days. Try to keep it above the level of your heart. This will help reduce swelling. · Follow the cast care instructions your doctor gives you.  If you have a splint, do not take it off unless your doctor tells you to. Cast and splint care  · If you have a removable splint, ask your doctor if it is okay to remove it to bathe. Your doctor may want you to keep it on as much as possible. · Keep your plaster splint covered by taping a sheet of plastic around it when you bathe. Water under the plaster can cause your skin to itch and hurt. · Never cut your splint. When should you call for help? Call 911 anytime you think you may need emergency care. For example, call if:  · You have sudden chest pain and shortness of breath, or you cough up blood. Call your doctor now or seek immediate medical care if:  · You have increased or severe pain. · Your toes are cool or pale or change color. · You have tingling, weakness, or numbness in your foot. · Your cast or splint feels too tight. · You cannot move your toes. · You have signs of a blood clot, such as:  ¨ Pain in your calf, back of the knee, thigh, or groin. ¨ Redness or swelling in your leg or groin. Watch closely for changes in your health, and be sure to contact your doctor if:  · Your pain is not better in 2 to 3 days. Where can you learn more? Go to http://darius-gricelda.info/. Enter A035 in the search box to learn more about \"Broken Foot: Care Instructions. \"  Current as of: March 21, 2017  Content Version: 11.3  © 4091-5242 [employer] Care instructions adapted under license by [employer] (which disclaims liability or warranty for this information). If you have questions about a medical condition or this instruction, always ask your healthcare professional. [place] [address] any warranty or liability for your use of this information. Fifth Metatarsal Fracture: Rehab Exercises  Your Care Instructions  Here are some examples of typical rehabilitation exercises for your condition. Start each exercise slowly.  Ease off the exercise if you start to have pain. Your doctor or physical therapist will tell you when you can start these exercises and which ones will work best for you. How to do the exercises  Calf wall stretch (back knee straight)    1. Stand facing a wall with your hands on the wall at about eye level. Put your affected foot about a step behind your other foot. 2. Keeping your back leg straight and your back heel on the floor, bend your front knee and gently bring your hip and chest toward the wall until you feel a stretch in the calf of your back leg. 3. Hold the stretch for at least 15 to 30 seconds. 4. Repeat 2 to 4 times. Calf wall stretch (knees bent)    1. Stand facing a wall with your hands on the wall at about eye level. Put your affected foot about a step behind your other foot. 2. Keeping both heels on the floor, bend both knees. Then gently bring your hip and chest toward the wall until you feel a stretch in the calf of your back leg. 3. Hold the stretch for at least 15 to 30 seconds. 4. Repeat 2 to 4 times. [place] pick-ups    1. Put some marbles on the floor next to a cup.  2. Sit in a chair, and use the toes of your affected foot to lift up one marble from the floor at a time. Then try to put the marble in the cup.  3. Repeat 8 to 12 times. Towel scrunches    1. Sit in a chair, and place both feet on a towel on the floor. 2. Scrunch the towel toward you with your toes. Then use your toes to push the towel back into place. 3. Repeat 8 to 12 times. Towel inversion and eversion    1. Sit in a chair, and place both feet on a towel on the floor. 2. Swivel your feet from side to side to slide the towel. First slide your toes, then your heels, as you move the towel with your feet. Then change directions and swivel your feet from side to side to slide the towel back to the starting position. 3. Repeat 8 to 12 times. Resisted ankle inversion    1. Sit on the floor with your good foot crossed over your affected foot.   2. Hold both ends of an exercise band, and loop the band around the inside of your affected foot. Then press your other foot against the band. 3. Keeping your feet crossed, slowly push your affected foot against the band so that foot moves away from your other foot. Then slowly relax. 4. Repeat 8 to 12 times. Resisted ankle eversion    1. Sit on the floor with your legs straight. 2. Hold both ends of an exercise band, and loop the band around the outside of your affected foot. Then press your other foot against the band. 3. Keeping your leg straight, slowly push your affected foot outward against the band and away from your other foot without letting your leg rotate. Then slowly relax. 4. Repeat 8 to 12 times. Follow-up care is a key part of your treatment and safety. Be sure to make and go to all appointments, and call your doctor if you are having problems. It's also a good idea to know your test results and keep a list of the medicines you take. Where can you learn more? Go to http://darius-gricelda.info/. Enter 843 4918 in the search box to learn more about \"Fifth Metatarsal Fracture: Rehab Exercises. \"  Current as of: March 21, 2017  Content Version: 11.3  © 7693-7162 [employer]. Care instructions adapted under license by [employer] (which disclaims liability or warranty for this information). If you have questions about a medical condition or this instruction, always ask your healthcare professional. [place] [address] any warranty or liability for your use of this information.

## 2017-09-22 NOTE — MR AVS SNAPSHOT
Visit Information Date & Time Provider Department Dept. Phone Encounter #  
 9/22/2017  9:00 AM Shelton Valdovinos MD [facility] [phone] [id number] Upcoming Health Maintenance Date Due  
 EYE EXAM RETINAL OR DILATED Q1 8/29/2018* HEMOGLOBIN A1C Q6M 12/13/2017 FOOT EXAM Q1 3/24/2018 MICROALBUMIN Q1 6/13/2018 LIPID PANEL Q1 6/13/2018 DTaP/Tdap/Td series (2 - Td) 9/1/2026 *Topic was postponed. The date shown is not the original due date. Allergies as of 9/22/2017  Review Complete On: 9/13/2017 By: Dania Schafer, LPN Severity Noted Reaction Type Reactions Bactrim [Sulfamethoprim Ds]  11/09/2015    Other (comments) Nausea, abdominal pain and cramping Byetta [Exenatide]  10/21/2010    Rash Current Immunizations  Reviewed on 8/29/2017 Name Date Influenza Vaccine 10/20/2015, 10/8/2013 Influenza Vaccine (Quad) PF 8/29/2017, 9/8/2016 Influenza Vaccine PF 9/14/2012 Pneumococcal Conjugate (PCV-13) 5/5/2015 Pneumococcal Vaccine (Unspecified Type) 2/2/2011 TD Vaccine 12/2/2010 Not reviewed this visit Vitals OB Status Smoking Status Having regular periods Never Smoker Preferred Pharmacy Pharmacy Name Phone [facility] [address] [place], [place] [address]y [address] AT [address] [place] [address] [phone] Your Updated Medication List  
  
   
This list is accurate as of: 9/22/17  9:13 AM.  Always use your most recent med list.  
  
  
  
  
 Adhesive Bandage 6 X 8 \" Bndg Commonly known as:  Tendra Mepilex Border 1 Each by Apply Externally route daily. One, T14.8, 2x4cm open area with bloody exudate ALPRAZolam 0.25 mg tablet Commonly known as:  Juan Grange Take 1 Tab by mouth three (3) times daily as needed for Anxiety.  Max Daily Amount: 0.75 mg.  
  
 ammonium lactate 12 % topical cream  
Commonly known as:  AMLACTIN  
 APPLY TO THE AFFECTED AREA TWICE A DAY RUB IN WELL  
  
 diclofenac 1 % Gel Commonly known as:  VOLTAREN Apply  to affected area four (4) times daily. fenofibrate nanocrystallized 145 mg tablet Commonly known as:  TRICOR  
TAKE 1 TABLET BY MOUTH DAILY  
  
 fexofenadine 180 mg tablet Commonly known as:  Zaheer Plank Take 1 Tab by mouth daily. fluticasone 50 mcg/actuation nasal spray Commonly known as:  Wenda Maze 2 Sprays by Both Nostrils route daily. furosemide 20 mg tablet Commonly known as:  LASIX TAKE 1 TABLET BY MOUTH EVERY DAY  
  
 hydrOXYzine pamoate 25 mg capsule Commonly known as:  VISTARIL  
TAKE 1 CAPSULE BY MOUTH THREE TIMES DAILY AS NEEDED FOR ITCHING  
  
 insulin glargine 100 unit/mL (3 mL) Inpn Commonly known as:  LANTUSBASAGLAR  
by SubCUTAneous route two (2) times a day. 80mg am and 80mg pm.  
  
 mupirocin calcium 2 % topical cream  
Commonly known as:  BACTROBAN  
APPLY EXTERNALLY TO THE AFFECTED AREA EVERY DAY AS DIRECTED  
  
 naftifine 1 % topical cream  
Commonly known as:  NAFTIN  
APPLY EXTERNALLY TO THE AFFECTED AREA EVERY DAY FOR 2 WEEKS OR AS DIRECTED NovoLOG Flexpen 100 unit/mL Inpn Generic drug:  insulin aspart  
by SubCUTAneous route Before breakfast, lunch, dinner and at bedtime. Sliding scale  
  
 nystatin-triamcinolone topical cream  
Commonly known as:  MYCOLOG II  
APPLY EXTERNALLY TO THE AFFECTED AREA TWICE DAILY pantoprazole 20 mg tablet Commonly known as:  PROTONIX  
TAKE 1 TABLET BY MOUTH TWICE DAILY  
  
 risperiDONE 1 mg tablet Commonly known as:  RisperDAL TAKE 1 TABLET BY MOUTH TWICE DAILY  
  
 silver sulfADIAZINE 1 % topical cream  
Commonly known as:  SILVADENE Apply  to affected area daily. Apply daily as directed  
  
 traMADol 50 mg tablet Commonly known as:  ULTRAM  
Take 1 Tab by mouth every six (6) hours as needed for Pain. Max Daily Amount: 200 mg.   
  
  
  
  
To-Do List   
 09/25/2017 1:15 PM  
 Appointment with Lona Carver MD; [address] W[address] at [facility] ([phone]) Patient Instructions Broken Foot: Care Instructions Your Care Instructions A broken foot, or foot fracture, is a break in one or more of the bones in your foot. It may happen because of a sports injury, a fall, or other accident. A compound, or open, fracture occurs when a bone breaks through the skin. A break that does not poke through the skin is a closed fracture. Your treatment depends on the location and type of break in your foot. You may need a splint, a cast, or an orthopedic shoe. Certain kinds of injuries may need surgery at some time. Whatever your treatment, you can ease symptoms and help your foot heal with care at home. You may need 6 to 8 weeks or more to fully heal. 
You heal best when you take good care of yourself. Eat a variety of healthy foods, and don't smoke. Follow-up care is a key part of your treatment and safety. Be sure to make and go to all appointments, and call your doctor if you are having problems. It's also a good idea to know your test results and keep a list of the medicines you take. How can you care for yourself at home? · Be safe with medicines. Take pain medicines exactly as directed. ¨ If the doctor gave you a prescription medicine for pain, take it as prescribed. ¨ If you are not taking a prescription pain medicine, ask your doctor if you can take an over-the-counter medicine. · Leave the splint on until your follow-up appointment. Do not put any weight on the injured foot. If you were given crutches, use them as directed. · Put ice or a cold pack on your foot for 10 to 20 minutes at a time. Try to do this every 1 to 2 hours for the next 3 days (when you are awake) or until the swelling goes down. Put a thin cloth between the ice and your skin.  
· Prop up the sore foot on a pillow anytime you sit or lie down during the next 3 days. Try to keep it above the level of your heart. This will help reduce swelling. · Follow the cast care instructions your doctor gives you. If you have a splint, do not take it off unless your doctor tells you to. Cast and splint care · If you have a removable splint, ask your doctor if it is okay to remove it to bathe. Your doctor may want you to keep it on as much as possible. · Keep your plaster splint covered by taping a sheet of plastic around it when you bathe. Water under the plaster can cause your skin to itch and hurt. · Never cut your splint. When should you call for help? Call 911 anytime you think you may need emergency care. For example, call if: 
· You have sudden chest pain and shortness of breath, or you cough up blood. Call your doctor now or seek immediate medical care if: 
· You have increased or severe pain. · Your toes are cool or pale or change color. · You have tingling, weakness, or numbness in your foot. · Your cast or splint feels too tight. · You cannot move your toes. · You have signs of a blood clot, such as: 
¨ Pain in your calf, back of the knee, thigh, or groin. ¨ Redness or swelling in your leg or groin. Watch closely for changes in your health, and be sure to contact your doctor if: 
· Your pain is not better in 2 to 3 days. Where can you learn more? Go to http://darius-gricelda.info/. Enter I155 in the search box to learn more about \"Broken Foot: Care Instructions. \" Current as of: March 21, 2017 Content Version: 11.3 © 8167-6253 [employer]. Care instructions adapted under license by [employer] (which disclaims liability or warranty for this information). If you have questions about a medical condition or this instruction, always ask your healthcare professional. [place] [address] any warranty or liability for your use of this information. Fifth Metatarsal Fracture: Rehab Exercises Your Care Instructions Here are some examples of typical rehabilitation exercises for your condition. Start each exercise slowly. Ease off the exercise if you start to have pain. Your doctor or physical therapist will tell you when you can start these exercises and which ones will work best for you. How to do the exercises Calf wall stretch (back knee straight) 1. Stand facing a wall with your hands on the wall at about eye level. Put your affected foot about a step behind your other foot. 2. Keeping your back leg straight and your back heel on the floor, bend your front knee and gently bring your hip and chest toward the wall until you feel a stretch in the calf of your back leg. 3. Hold the stretch for at least 15 to 30 seconds. 4. Repeat 2 to 4 times. Calf wall stretch (knees bent) 1. Stand facing a wall with your hands on the wall at about eye level. Put your affected foot about a step behind your other foot. 2. Keeping both heels on the floor, bend both knees. Then gently bring your hip and chest toward the wall until you feel a stretch in the calf of your back leg. 3. Hold the stretch for at least 15 to 30 seconds. 4. Repeat 2 to 4 times. [place] pick-ups 1. Put some marbles on the floor next to a cup. 
2. Sit in a chair, and use the toes of your affected foot to lift up one marble from the floor at a time. Then try to put the marble in the cup. 
3. Repeat 8 to 12 times. Towel scrunches 1. Sit in a chair, and place both feet on a towel on the floor. 2. Scrunch the towel toward you with your toes. Then use your toes to push the towel back into place. 3. Repeat 8 to 12 times. Towel inversion and eversion 1. Sit in a chair, and place both feet on a towel on the floor. 2. Swivel your feet from side to side to slide the towel. First slide your toes, then your heels, as you move the towel with your feet.  Then change directions and swivel your feet from side to side to slide the towel back to the starting position. 3. Repeat 8 to 12 times. Resisted ankle inversion 1. Sit on the floor with your good foot crossed over your affected foot. 2. Hold both ends of an exercise band, and loop the band around the inside of your affected foot. Then press your other foot against the band. 3. Keeping your feet crossed, slowly push your affected foot against the band so that foot moves away from your other foot. Then slowly relax. 4. Repeat 8 to 12 times. Resisted ankle eversion 1. Sit on the floor with your legs straight. 2. Hold both ends of an exercise band, and loop the band around the outside of your affected foot. Then press your other foot against the band. 3. Keeping your leg straight, slowly push your affected foot outward against the band and away from your other foot without letting your leg rotate. Then slowly relax. 4. Repeat 8 to 12 times. Follow-up care is a key part of your treatment and safety. Be sure to make and go to all appointments, and call your doctor if you are having problems. It's also a good idea to know your test results and keep a list of the medicines you take. Where can you learn more? Go to http://darius-gricelda.info/. Enter 231 2732 in the search box to learn more about \"Fifth Metatarsal Fracture: Rehab Exercises. \" Current as of: March 21, 2017 Content Version: 11.3 © 0553-0874 [employer]. Care instructions adapted under license by [employer] (which disclaims liability or warranty for this information). If you have questions about a medical condition or this instruction, always ask your healthcare professional. [address] any warranty or liability for your use of this information. Introducing [facility]!    
 Tracey Ly introduces [employer] patient portal. Now you can access parts of your medical record, email your doctor's office, and request medication refills online. 1. In your internet browser, go to [email or web address] 2. Click on the First Time User? Click Here link in the Sign In box. You will see the New Member Sign Up page. 3. Enter your [employer] what's in store Access Code exactly as it appears below. You will not need to use this code after youve completed the sign-up process. If you do not sign up before the expiration date, you must request a new code. · [employer] [facility] in store Access Code: [email or web address] Expires: 11/16/2017 11:48 AM 
 
4. Enter the last four digits of your Social Security Number (xxxx) and Date of Birth (mm/dd/yyyy) as indicated and click Submit. You will be taken to the next sign-up page. 5. Create a [employer] It's what's in store ID. This will be your [employer] in store login ID and cannot be changed, so think of one that is secure and easy to remember. 6. Create a [employer] It[employer] what's in store password. You can change your password at any time. 7. Enter your Password Reset Question and Answer. This can be used at a later time if you forget your password. 8. Enter your e-mail address. You will receive e-mail notification when new information is available in [address] 9. Click Sign Up. You can now view and download portions of your medical record. 10. Click the Download Summary menu link to download a portable copy of your medical information. If you have questions, please visit the Frequently Asked Questions section of the [employer] what's in store website. Remember, [employer] [facility] in store is NOT to be used for urgent needs. For medical emergencies, dial 911. Now available from your iPhone and Android! Please provide this summary of care documentation to your next provider. Your primary care clinician is listed as Faheem Obrien. If you have any questions after today's visit, please call [phone].

## 2017-09-22 NOTE — PROGRESS NOTES
Chief Complaint   Patient presents with    Foot Pain     right     1. Have you been to the ER, urgent care clinic since your last visit? Hospitalized since your last visit? No    2. Have you seen or consulted any other health care providers outside of the [address] [place] [address] since your last visit? Include any pap smears or colon screening.  No

## 2017-09-25 ENCOUNTER — HOSPITAL ENCOUNTER (OUTPATIENT)
Dept: WOUND CARE | Age: 32
Discharge: HOME | End: 2017-09-25
Payer: MEDICARE

## 2017-09-25 PROCEDURE — 97597 DBRDMT OPN WND 1ST 20 CM/<: CPT

## 2017-09-25 RX ORDER — LIDOCAINE HYDROCHLORIDE 20 MG/ML
JELLY TOPICAL ONCE
Status: COMPLETED | OUTPATIENT
Start: 2017-09-25 | End: 2017-09-25

## 2017-09-25 RX ADMIN — LIDOCAINE HYDROCHLORIDE: 20 JELLY TOPICAL at 13:26

## 2017-09-25 NOTE — PROGRESS NOTES
I have noted, and reviewed today's data for this patient in [facility], and '[facility]' and concur with same. Patient notes today: I'm ok. Mom says the wound is nearly closed. .  Lesion/Wound on Presentation today: ulcer shallow . Procedure:     Wound # abdomen. Procedure name: sharp debridement. Anaesthesia: topical lidocaine. Description: using a sharp curette I removed exudate through out  The wound base to good bleeding tissue . Blood Loss: minimal.  Post Procedure Condition/ Diagnosis: healing . Follow up plan today: return 1-2 weeks. Ziggy Polk

## 2017-10-05 RX ORDER — AMMONIUM LACTATE 12 G/100G
CREAM TOPICAL
Qty: 385 G | Refills: 0 | Status: SHIPPED | OUTPATIENT
Start: 2017-10-05 | End: 2017-11-02 | Stop reason: SDUPTHER

## 2017-10-05 RX ORDER — NYSTATIN AND TRIAMCINOLONE ACETONIDE 100000; 1 [USP'U]/G; MG/G
CREAM TOPICAL
Qty: 240 G | Refills: 0 | Status: SHIPPED | OUTPATIENT
Start: 2017-10-05 | End: 2017-11-02 | Stop reason: SDUPTHER

## 2017-10-05 RX ORDER — PANTOPRAZOLE SODIUM 20 MG/1
TABLET, DELAYED RELEASE ORAL
Qty: 60 TAB | Refills: 0 | Status: SHIPPED | OUTPATIENT
Start: 2017-10-05 | End: 2017-10-23 | Stop reason: SDUPTHER

## 2017-10-09 ENCOUNTER — HOSPITAL ENCOUNTER (OUTPATIENT)
Dept: WOUND CARE | Age: 32
Discharge: HOME | End: 2017-10-09
Payer: MEDICARE

## 2017-10-09 PROCEDURE — 11042 DBRDMT SUBQ TIS 1ST 20SQCM/<: CPT

## 2017-10-09 RX ORDER — LIDOCAINE HYDROCHLORIDE 20 MG/ML
JELLY TOPICAL AS NEEDED
Status: DISCONTINUED | OUTPATIENT
Start: 2017-10-09 | End: 2017-10-13 | Stop reason: HOSPADM

## 2017-10-09 RX ADMIN — LIDOCAINE HYDROCHLORIDE 3 ML: 20 JELLY TOPICAL at 13:55

## 2017-10-09 NOTE — PROGRESS NOTES
I have noted, and reviewed today's data for this patient in [facility], and '[facility]' and concur with same. The focused physical exam today is unchanged except as noted below. Patient notes today: I'm good when can I go swimming. Mom reports that she is still using the xylocaine gel with each dressing. Lesion/Wound, focused exam on Presentation today: ulcer another 50% smaller but not quite closes. .    Procedure:     Wound # midline pannus. Procedure name: sharp selective debridement. Anaesthesia: topical lidocaine. Description: using a sharp curette I removed adherent exudate as encountered over the ulcer base to a good bleeding base result. .  Blood Loss: minimal.  Post Procedure Condition/ Diagnosis: healing well. .  Follow up plan today: stop the use of lidocaine gel, will go to [facility] [place] since progress has slowed some. Return 2 weeks. Carlotta Gonsalves

## 2017-10-23 ENCOUNTER — OFFICE VISIT (OUTPATIENT)
Dept: FAMILY MEDICINE CLINIC | Age: 32
End: 2017-10-23

## 2017-10-23 ENCOUNTER — HOSPITAL ENCOUNTER (OUTPATIENT)
Dept: WOUND CARE | Age: 32
Discharge: HOME | End: 2017-10-23
Payer: MEDICARE

## 2017-10-23 VITALS
OXYGEN SATURATION: 96 % | TEMPERATURE: 98.2 F | HEART RATE: 103 BPM | BODY MASS INDEX: 67.81 KG/M2 | RESPIRATION RATE: 20 BRPM | WEIGHT: 293 LBS | HEIGHT: 55 IN

## 2017-10-23 DIAGNOSIS — N17.9 ACUTE RENAL FAILURE, UNSPECIFIED ACUTE RENAL FAILURE TYPE (HCC): ICD-10-CM

## 2017-10-23 DIAGNOSIS — J06.9 UPPER RESPIRATORY TRACT INFECTION, UNSPECIFIED TYPE: Primary | ICD-10-CM

## 2017-10-23 DIAGNOSIS — R05.9 COUGH: ICD-10-CM

## 2017-10-23 DIAGNOSIS — E11.65 TYPE 2 DIABETES MELLITUS WITH HYPERGLYCEMIA, UNSPECIFIED LONG TERM INSULIN USE STATUS: ICD-10-CM

## 2017-10-23 DIAGNOSIS — K21.9 GASTROESOPHAGEAL REFLUX DISEASE WITHOUT ESOPHAGITIS: ICD-10-CM

## 2017-10-23 DIAGNOSIS — I50.9 CONGESTIVE HEART FAILURE, UNSPECIFIED CONGESTIVE HEART FAILURE CHRONICITY, UNSPECIFIED CONGESTIVE HEART FAILURE TYPE: ICD-10-CM

## 2017-10-23 PROCEDURE — 97597 DBRDMT OPN WND 1ST 20 CM/<: CPT

## 2017-10-23 RX ORDER — PANTOPRAZOLE SODIUM 40 MG/1
40 TABLET, DELAYED RELEASE ORAL DAILY
Qty: 30 TAB | Refills: 11 | Status: SHIPPED | OUTPATIENT
Start: 2017-10-23 | End: 2018-05-07 | Stop reason: SDUPTHER

## 2017-10-23 RX ORDER — BENZONATATE 200 MG/1
200 CAPSULE ORAL
Qty: 21 CAP | Refills: 0 | Status: SHIPPED | OUTPATIENT
Start: 2017-10-23 | End: 2017-10-30

## 2017-10-23 RX ORDER — DOXYCYCLINE 100 MG/1
100 TABLET ORAL DAILY
Qty: 10 TAB | Refills: 0 | Status: SHIPPED | OUTPATIENT
Start: 2017-10-23 | End: 2017-11-02

## 2017-10-23 NOTE — PROGRESS NOTES
I have noted, and reviewed today's data for this patient in [facility], and '[facility]' and concur with same. The focused physical exam today is unchanged except as noted below. Patient notes today: It's better, when can I go swimming and bath. .  Lesion/Wound, focused exam on Presentation today: very small superficial/dermal ulcer remains. No pain or drainage. Procedure:     Wound # Low abdominal skin ulcer. Procedure name: sharp selective debridment. Anaesthesia: topical lidocaine. Description: using a sharp curette I removed adherent exudate wherever encountered with a clean bleeding base to result. .  Blood Loss: none. Post Procedure Condition/ Diagnosis: improving, should be closed but 2 weeks or so may be necessary. .  Follow up plan today: return 1-2 weeks. Dana Godfrey

## 2017-10-23 NOTE — ASSESSMENT & PLAN NOTE
This condition is managed by Specialist.   Key CKD Meds             furosemide (LASIX) 20 mg tablet TAKE 1 TABLET BY MOUTH EVERY DAY        Lab Results   Component Value Date/Time    Creatinine CANCELED 06/13/2017 11:54 AM    BUN CANCELED 06/13/2017 11:54 AM    Sodium CANCELED 06/13/2017 11:54 AM    Potassium CANCELED 06/13/2017 11:54 AM    Chloride CANCELED 06/13/2017 11:54 AM    CO2 CANCELED 06/13/2017 11:54 AM    Calcium CANCELED 06/13/2017 11:54 AM    VITAMIN D, 25-HYDROXY CANCELED 06/13/2017 11:54 AM     CrCl cannot be calculated (This lab value for creatinine cannot be used to calculate CrCl because it is not a number: CANCELED).

## 2017-10-23 NOTE — ASSESSMENT & PLAN NOTE
This condition is managed by Specialist.  Key Antihyperglycemic Medications             insulin glargine (LANTUS,BASAGLAR) 100 unit/mL (3 mL) inpn by SubCUTAneous route two (2) times a day. 80mg am and 80mg pm.    insulin aspart (NOVOLOG FLEXPEN) 100 unit/mL inpn by SubCUTAneous route Before breakfast, lunch, dinner and at bedtime.  Sliding scale        Other Key Diabetic Medications             fenofibrate nanocrystallized (TRICOR) 145 mg tablet TAKE 1 TABLET BY MOUTH DAILY        Lab Results   Component Value Date/Time    Hemoglobin A1c 8.6 06/13/2017 11:54 AM    Glucose CANCELED 06/13/2017 11:54 AM    Creatinine CANCELED 06/13/2017 11:54 AM    Microalbumin/creat ratio (POC) <30 06/13/2017 01:30 PM    Cholesterol, total CANCELED 06/13/2017 11:54 AM    HDL Cholesterol CANCELED 06/13/2017 11:54 AM    LDL,Direct 107 06/13/2017 11:54 AM     Diabetic Foot and Eye Exam HM Status   Topic Date Due    Eye Exam  08/29/2018 (Originally 4/21/2017)   [facility] [place] Diabetic Foot Care  03/24/2018

## 2017-10-23 NOTE — MR AVS SNAPSHOT
Visit Information Date & Time Provider Department Dept. Phone Encounter #  
 10/23/2017  5:50 PM Katerine Dickerson MD [address] [place] [address] [phone] [id number] Upcoming Health Maintenance Date Due  
 EYE EXAM RETINAL OR DILATED Q1 8/29/2018* HEMOGLOBIN A1C Q6M 12/13/2017 FOOT EXAM Q1 3/24/2018 MICROALBUMIN Q1 6/13/2018 LIPID PANEL Q1 6/13/2018 DTaP/Tdap/Td series (2 - Td) 9/1/2026 *Topic was postponed. The date shown is not the original due date. Allergies as of 10/23/2017  Review Complete On: 10/23/2017 By: Francesca Elizabeth RN Severity Noted Reaction Type Reactions Bactrim [Sulfamethoprim Ds]  11/09/2015    Other (comments) Nausea, abdominal pain and cramping Byetta [Exenatide]  10/21/2010    Rash Current Immunizations  Reviewed on 8/29/2017 Name Date Influenza Vaccine 10/20/2015, 10/8/2013 Influenza Vaccine (Quad) PF 8/29/2017, 9/8/2016 Influenza Vaccine PF 9/14/2012 Pneumococcal Conjugate (PCV-13) 5/5/2015 Pneumococcal Vaccine (Unspecified Type) 2/2/2011 TD Vaccine 12/2/2010 Not reviewed this visit You Were Diagnosed With   
  
 Codes Comments Upper respiratory tract infection, unspecified type    -  Primary ICD-10-CM: J06.9 ICD-9-CM: 465.9 Cough     ICD-10-CM: R05 ICD-9-CM: 111. 2 Vitals Pulse Temp Resp Height(growth percentile) Weight(growth percentile) SpO2  
 (!) 103 98.2 °F (36.8 °C) (Oral) 20 4' 7\" (1.397 m) 302 lb (137 kg) 96% BMI OB Status Smoking Status 70.19 kg/m2 Unknown Never Smoker Vitals History BMI and BSA Data Body Mass Index Body Surface Area  
 70.19 kg/m 2 2.31 m 2 Preferred Pharmacy Pharmacy Name Phone [facility] [address]  [facility] [phone] Your Updated Medication List  
  
   
 This list is accurate as of: 10/23/17  5:57 PM.  Always use your most recent med list.  
  
  
  
  
 Adhesive Bandage 6 X 8 \" Bndg Commonly known as:  Tendra Mepilex Border 1 Each by Apply Externally route daily. One, T14.8, 2x4cm open area with bloody exudate ALPRAZolam 0.25 mg tablet Commonly known as:  Wendall Espinosa Take 1 Tab by mouth three (3) times daily as needed for Anxiety. Max Daily Amount: 0.75 mg.  
  
 ammonium lactate 12 % topical cream  
Commonly known as:  AMLACTIN  
APPLY TO THE AFFECTED AREA TWICE DAILY RUB IN WELL  
  
 benzonatate 200 mg capsule Commonly known as:  TESSALON Take 1 Cap by mouth three (3) times daily as needed for Cough for up to 7 days. diclofenac 1 % Gel Commonly known as:  VOLTAREN Apply  to affected area four (4) times daily. doxycycline 100 mg tablet Commonly known as:  ADOXA Take 1 Tab by mouth daily for 10 days. fenofibrate nanocrystallized 145 mg tablet Commonly known as:  TRICOR  
TAKE 1 TABLET BY MOUTH DAILY  
  
 fexofenadine 180 mg tablet Commonly known as:  Alissa Naegeli Take 1 Tab by mouth daily. fluticasone 50 mcg/actuation nasal spray Commonly known as:  Laurence Roswell 2 Sprays by Both Nostrils route daily. furosemide 20 mg tablet Commonly known as:  LASIX TAKE 1 TABLET BY MOUTH EVERY DAY  
  
 hydrOXYzine pamoate 25 mg capsule Commonly known as:  VISTARIL  
TAKE 1 CAPSULE BY MOUTH THREE TIMES DAILY AS NEEDED FOR ITCHING  
  
 insulin glargine 100 unit/mL (3 mL) Inpn Commonly known as:  LANTUS,BASAGLAR  
by SubCUTAneous route two (2) times a day. 80mg am and 80mg pm.  
  
 mupirocin calcium 2 % topical cream  
Commonly known as:  BACTROBAN  
APPLY EXTERNALLY TO THE AFFECTED AREA EVERY DAY AS DIRECTED  
  
 naftifine 1 % topical cream  
Commonly known as:  NAFTIN  
APPLY EXTERNALLY TO THE AFFECTED AREA EVERY DAY FOR 2 WEEKS OR AS DIRECTED NovoLOG Flexpen 100 unit/mL Inpn Generic drug:  insulin aspart by SubCUTAneous route Before breakfast, lunch, dinner and at bedtime. Sliding scale  
  
 nystatin-triamcinolone topical cream  
Commonly known as:  MYCOLOG II  
APPLY EXTERNALLY TO THE AFFECTED AREA TWICE DAILY pantoprazole 20 mg tablet Commonly known as:  PROTONIX  
TAKE 1 TABLET BY MOUTH TWICE DAILY  
  
 risperiDONE 1 mg tablet Commonly known as:  RisperDAL TAKE 1 TABLET BY MOUTH TWICE DAILY  
  
 silver sulfADIAZINE 1 % topical cream  
Commonly known as:  SILVADENE Apply  to affected area daily. Apply daily as directed  
  
 traMADol 50 mg tablet Commonly known as:  ULTRAM  
Take 1 Tab by mouth every six (6) hours as needed for Pain. Max Daily Amount: 200 mg. Prescriptions Sent to Pharmacy Refills  
 doxycycline (ADOXA) 100 mg tablet 0 Sig: Take 1 Tab by mouth daily for 10 days. Class: Normal  
 Pharmacy: [facility] [address], [place] - [address] [facility] of [address] ([facility] Ph #: [phone] Route: Oral  
 benzonatate (TESSALON) 200 mg capsule 0 Sig: Take 1 Cap by mouth three (3) times daily as needed for Cough for up to 7 days. Class: Normal  
 Pharmacy: [facility] [address], [place] - [address] AT [facility] of [address] ([facility] Ph #: [phone] Route: Oral  
  
To-Do List   
 10/30/2017 1:15 PM  
  Appointment with Emerita Monsalve MD; [address] WOUND at [facility] ([phone]) Patient Instructions Take Doxycycline with food and a big glass of water. Avoid the sun while on Doxycycline (either cover up or wear SPF 15 or above sun block) Use mucinex to break up congestion Use tessalon pills for cough Introducing hospitals & HEALTH SERVICES! Sedrick Zee introduces [employer] patient portal. Now you can access parts of your medical record, email your doctor's office, and request medication refills online.    
 
1. In your internet browser, go to [email or web address] 2. Click on the First Time User? Click Here link in the Sign In box. You will see the New Member Sign Up page. 3. Enter your [employer] Access Code exactly as it appears below. You will not need to use this code after youve completed the sign-up process. If you do not sign up before the expiration date, you must request a new code. · [employer] Access Code: [email or web address] Expires: 11/16/2017 11:48 AM 
 
4. Enter the last four digits of your Social Security Number (xxxx) and Date of Birth (mm/dd/yyyy) as indicated and click Submit. You will be taken to the next sign-up page. 5. Create a [employer] ID. This will be your [facility] login ID and cannot be changed, so think of one that is secure and easy to remember. 6. Create a [employer] password. You can change your password at any time. 7. Enter your Password Reset Question and Answer. This can be used at a later time if you forget your password. 8. Enter your e-mail address. You will receive e-mail notification when new information is available in [address] 9. Click Sign Up. You can now view and download portions of your medical record. 10. Click the Download Summary menu link to download a portable copy of your medical information. If you have questions, please visit the Frequently Asked Questions section of the [employer] website. Remember, [employer] is NOT to be used for urgent needs. For medical emergencies, dial 911. Now available from your iPhone and Android! Please provide this summary of care documentation to your next provider. Your primary care clinician is listed as Anne Marie Sosa. If you have any questions after today's visit, please call [phone].

## 2017-10-23 NOTE — PROGRESS NOTES
Darnell Irizarry is a 28 y.o. female      Issues discussed today include:        Signs and symptoms:  Cough producing clear sputum  Duration: 2 week  Context:  +exposures  Location:  Head and chest  Quality:  congestion  Severity:  Preventing rest  Timing:  now  Modifying factors:  No fevers    Data reviewed or ordered today:  none    Other problems include:  Patient Active Problem List   Diagnosis Code    Prader-Willi syndrome Q87.1    CHF (congestive heart failure) ([facility]) I50.9    Lymphedema I89.0    Anxiety disorder F41.9    Scoliosis M41.9    DM (diabetes mellitus), type 2 ([address] E11.9    HTN (hypertension) I10    Hyperlipidemia E78.5    Sleep apnea G47.30    Bleeding hemorrhoids K64.9    ARF (acute renal failure) ([facility]) N17.9    CKD (chronic kidney disease) N18.9    Ingrown toenail without infection L60.0    Urinary incontinence R32    MR (mental retardation), moderate F71    Psychosis F29    Closed fracture of right foot with routine healing S92.901D       Medications:  Current Outpatient Prescriptions   Medication Sig Dispense Refill    doxycycline (ADOXA) 100 mg tablet Take 1 Tab by mouth daily for 10 days. 10 Tab 0    benzonatate (TESSALON) 200 mg capsule Take 1 Cap by mouth three (3) times daily as needed for Cough for up to 7 days. 21 Cap 0    pantoprazole (PROTONIX) 40 mg tablet Take 1 Tab by mouth daily. 30 Tab 11    ammonium lactate (AMLACTIN) 12 % topical cream APPLY TO THE AFFECTED AREA TWICE DAILY RUB IN WELL 385 g 0    nystatin-triamcinolone (MYCOLOG II) topical cream APPLY EXTERNALLY TO THE AFFECTED AREA TWICE DAILY 240 g 0    silver sulfADIAZINE (SILVADENE) 1 % topical cream Apply  to affected area daily. Apply daily as directed 85 g 1    traMADol (ULTRAM) 50 mg tablet Take 1 Tab by mouth every six (6) hours as needed for Pain. Max Daily Amount: 200 mg. 30 Tab 0    insulin glargine (LANTUS,BASAGLAR) 100 unit/mL (3 mL) inpn by SubCUTAneous route two (2) times a day.  80mg am and 80mg pm.      insulin aspart (NOVOLOG FLEXPEN) 100 unit/mL inpn by SubCUTAneous route Before breakfast, lunch, dinner and at bedtime. Sliding scale      ALPRAZolam (XANAX) 0.25 mg tablet Take 1 Tab by mouth three (3) times daily as needed for Anxiety. Max Daily Amount: 0.75 mg. 90 Tab 1    naftifine (NAFTIN) 1 % topical cream APPLY EXTERNALLY TO THE AFFECTED AREA EVERY DAY FOR 2 WEEKS OR AS DIRECTED 60 g 0    furosemide (LASIX) 20 mg tablet TAKE 1 TABLET BY MOUTH EVERY DAY 90 Tab 0    risperiDONE (RISPERDAL) 1 mg tablet TAKE 1 TABLET BY MOUTH TWICE DAILY (Patient taking differently: TAKE 1 TABLET BY IN THE MORNING AND 2 TABLETS AT BEDTIME) 60 Tab 0    Adhesive Bandage (TENDRA MEPILEX BORDER) 6 X 8 \" bndg 1 Each by Apply Externally route daily. One, T14.8, 2x4cm open area with bloody exudate 60 Each 12    diclofenac (VOLTAREN) 1 % gel Apply  to affected area four (4) times daily. 1 Each 11    mupirocin calcium (BACTROBAN) 2 % topical cream APPLY EXTERNALLY TO THE AFFECTED AREA EVERY DAY AS DIRECTED 30 g 0    hydrOXYzine (VISTARIL) 25 mg capsule TAKE 1 CAPSULE BY MOUTH THREE TIMES DAILY AS NEEDED FOR ITCHING 30 Cap 0    fexofenadine (ALLEGRA) 180 mg tablet Take 1 Tab by mouth daily. 30 Tab 11    fluticasone (FLONASE) 50 mcg/actuation nasal spray 2 Sprays by Both Nostrils route daily. 1 Bottle 6    fenofibrate nanocrystallized (TRICOR) 145 mg tablet TAKE 1 TABLET BY MOUTH DAILY 90 Tab 0       Allergies: Allergies   Allergen Reactions    Bactrim [Sulfamethoprim Ds] Other (comments)     Nausea, abdominal pain and cramping      Byetta [Exenatide] Rash       LMP:  No LMP recorded. Social History     Social History    Marital status: SINGLE     Spouse name: N/A    Number of children: N/A    Years of education: N/A     Occupational History    Not on file.      Social History Main Topics    Smoking status: Never Smoker    Smokeless tobacco: Never Used    Alcohol use No    Drug use: No    Sexual activity: No     Other Topics Concern    Not on file     Social History Narrative         Family History   Problem Relation Age of Onset    Hypertension Mother     Hypertension Father     MS Brother          Meaningful use:  done      ROS:  Headaches:  no  Chest Pain:  no  SOB:  no  Fevers:  no  Falls:  no  anxiety/depression/losing interest in doing things that were previously enjoyed:  no. PHQ2 = 0  Other significant ROS:      No LMP recorded. Physical Exam  Visit Vitals    Pulse (!) 103    Temp 98.2 °F (36.8 °C) (Oral)    Resp 20    Ht 4' 7\" (1.397 m)    Wt 302 lb (137 kg)    SpO2 96%    BMI 70.19 kg/m2     BP Readings from Last 3 Encounters:   08/18/17 120/74   05/26/16 130/80   04/26/16 129/62     Constitutional:  Appears stable,  No Acute Distress, Vitals noted  Psychiatric:   Affect normal, Alert and cooperative, baseline mentation    Eyes:   Pupils equally round and reactive, EOMI, conjunctiva clear, eyelids normal  ENT:   External ears and nose normal/lips, teeth=OK/gums normal, TMs and Orophyarynx normal  Neck:   general inspection and Thyroid normal.  No abnormal cervical or supraclavicular nodes    Lungs:  Some rhonchi to auscultation, good respiratory effort  Heart: Ausculation normal.  Regular rhythm. No cardiac murmurs.   No carotid bruits or palpable thrills  Chest wall normal  Abd:  benign  Extremities:  massive edema, good peripheral pulses  Skin:   Warm to palpation, without rashes, bruising, or suspicious lesions           Assessment:    Patient Active Problem List   Diagnosis Code    Prader-Willi syndrome Q87.1    CHF (congestive heart failure) ([facility]) I50.9    Lymphedema I89.0    Anxiety disorder F41.9    Scoliosis M41.9    DM (diabetes mellitus), type 2 ([facility] E11.9    HTN (hypertension) I10    Hyperlipidemia E78.5    Sleep apnea G47.30    Bleeding hemorrhoids K64.9    ARF (acute renal failure) ([facility]) N17.9    CKD (chronic kidney disease) N18.9    Ingrown toenail without infection L60.0    Urinary incontinence R32    MR (mental retardation), moderate F71    Psychosis F29    Closed fracture of right foot with routine healing S99.648I       Today's diagnoses are:    ICD-10-CM ICD-9-CM    1. Upper respiratory tract infection, unspecified type J06.9 465.9 doxycycline (ADOXA) 100 mg tablet   2. Cough R05 786.2 benzonatate (TESSALON) 200 mg capsule   3. Gastroesophageal reflux disease without esophagitis K21.9 530.81 pantoprazole (PROTONIX) 40 mg tablet   4. Acute renal failure, unspecified [facility] renal failure type [facility] N17.9 584.9    5. Congestive heart failure, unspecified congestive heart failure chronicity, unspecified congestive heart failure type ([facility]) I50.9 428.0    6. Type 2 diabetes mellitus with hyperglycemia, unspecified long term insulin use status ([facility]) E11.65 250.00        Plan:  Orders Placed This Encounter    doxycycline (ADOXA) 100 mg tablet     Sig: Take 1 Tab by mouth daily for 10 days. Dispense:  10 Tab     Refill:  0     Take with food    benzonatate (TESSALON) 200 mg capsule     Sig: Take 1 Cap by mouth three (3) times daily as needed for Cough for up to 7 days. Dispense:  21 Cap     Refill:  0    pantoprazole (PROTONIX) 40 mg tablet     Sig: Take 1 Tab by mouth daily. Dispense:  30 Tab     Refill:  11       See patient instructions  Patient Instructions   Take Doxycycline with food and a big glass of water.   Avoid the sun while on Doxycycline (either cover up or wear SPF 15 or above sun block)    Use mucinex to break up congestion    Use tessalon pills for cough          refresh note:  done    AVS Printed:  done

## 2017-10-23 NOTE — PROGRESS NOTES
Chief Complaint   Patient presents with    Cold    Cough     cough x 2 weeks      1. Have you been to the ER, urgent care clinic since your last visit? Hospitalized since your last visit? No    2. Have you seen or consulted any other health care providers outside of the [address] [place] [address] since your last visit? Include any pap smears or colon screening. No     Reviewed record in preparation for visit and have obtained necessary documentation.

## 2017-10-23 NOTE — ASSESSMENT & PLAN NOTE
This condition is managed by Specialist.  Key CAD CHF Meds             furosemide (LASIX) 20 mg tablet TAKE 1 TABLET BY MOUTH EVERY DAY        Key Antihyperlipidemia Meds             fenofibrate nanocrystallized (TRICOR) 145 mg tablet TAKE 1 TABLET BY MOUTH DAILY        Lab Results   Component Value Date/Time    Sodium CANCELED 06/13/2017 11:54 AM    Potassium CANCELED 06/13/2017 11:54 AM    Cholesterol, total CANCELED 06/13/2017 11:54 AM    HDL Cholesterol CANCELED 06/13/2017 11:54 AM    LDL,Direct 107 06/13/2017 11:54 AM

## 2017-10-23 NOTE — PATIENT INSTRUCTIONS
Take Doxycycline with food and a big glass of water.   Avoid the sun while on Doxycycline (either cover up or wear SPF 15 or above sun block)    Use mucinex to break up congestion    Use tessalon pills for cough

## 2017-10-30 ENCOUNTER — HOSPITAL ENCOUNTER (OUTPATIENT)
Dept: WOUND CARE | Age: 32
Discharge: HOME | End: 2017-10-30
Payer: MEDICARE

## 2017-10-30 PROCEDURE — 99212 OFFICE O/P EST SF 10 MIN: CPT

## 2017-10-30 RX ORDER — LIDOCAINE HYDROCHLORIDE 20 MG/ML
JELLY TOPICAL ONCE
Status: COMPLETED | OUTPATIENT
Start: 2017-10-30 | End: 2017-10-30

## 2017-10-30 RX ADMIN — LIDOCAINE HYDROCHLORIDE: 20 JELLY TOPICAL at 13:20

## 2017-10-30 NOTE — PROGRESS NOTES
I have noted, and reviewed today's data for this patient in [facility], and '[facility]' and concur with same. The focused physical exam, past history, review of symptoms and medications remains unchanged today except as noted below. Patient Report: Mom says she thinks it healed. .  Lesion/Wound, focused exam on Presentation today: ulcerated area is clean, dry and covered with skin. A new small pustule in the skin above this is noted and locally debrided to a clean base. .    Follow up plan:   Mupirocin to pustule area, Keep skin folds of the Pannus dry and may swim or tub but dry this skin area afterward, without abrasion and otherwise keep an absorbant pad in the fold to manage sweating. Quiana Banks

## 2017-11-02 RX ORDER — AMMONIUM LACTATE 12 G/100G
CREAM TOPICAL
Qty: 385 G | Refills: 0 | Status: SHIPPED | OUTPATIENT
Start: 2017-11-02 | End: 2017-12-05 | Stop reason: SDUPTHER

## 2017-11-02 RX ORDER — NYSTATIN AND TRIAMCINOLONE ACETONIDE 100000; 1 [USP'U]/G; MG/G
CREAM TOPICAL
Qty: 240 G | Refills: 0 | Status: SHIPPED | OUTPATIENT
Start: 2017-11-02 | End: 2017-12-05 | Stop reason: SDUPTHER

## 2017-11-15 ENCOUNTER — OFFICE VISIT (OUTPATIENT)
Dept: FAMILY MEDICINE CLINIC | Age: 32
End: 2017-11-15

## 2017-11-15 VITALS
HEART RATE: 66 BPM | WEIGHT: 293 LBS | HEIGHT: 55 IN | TEMPERATURE: 98 F | OXYGEN SATURATION: 94 % | BODY MASS INDEX: 67.81 KG/M2 | RESPIRATION RATE: 19 BRPM

## 2017-11-15 DIAGNOSIS — T14.8XXA OPEN WOUND: ICD-10-CM

## 2017-11-15 DIAGNOSIS — L89.892 DECUBITUS ULCER OF OTHER SITE, STAGE 2: Primary | ICD-10-CM

## 2017-11-15 RX ORDER — CEPHALEXIN 500 MG/1
500 CAPSULE ORAL 2 TIMES DAILY
Qty: 14 CAP | Refills: 0 | Status: SHIPPED | OUTPATIENT
Start: 2017-11-15 | End: 2017-11-22

## 2017-11-15 NOTE — PROGRESS NOTES
Chief Complaint   Patient presents with    Wound Check     patient have completed wound care center but fold under abdomen have began leaking again     1. Have you been to the ER, urgent care clinic since your last visit? Hospitalized since your last visit? No    2. Have you seen or consulted any other health care providers outside of the [address] [place] [address] since your last visit? Include any pap smears or colon screening.  No

## 2017-11-15 NOTE — PATIENT INSTRUCTIONS
1. Keep area clean and dry  2. Try applying Bactroban ointment twice daily to area  3. If more drainage or worsening redness/ swelling/ pain, start taking antibiotic that was sent to your pharmacy (Keflex / Cephalexin)  4. Return to clinic next week if no improvement to see Dr. Dalton Jenkins (tuesday afternoon)  5. A wound care referral was placed for you. Wound Care: After Your Visit  Your Care Instructions  Taking good care of your wound at home will help it heal quickly and reduce your chance of infection. The doctor has checked you carefully, but problems can develop later. If you notice any problems or new symptoms, get medical treatment right away. Follow-up care is a key part of your treatment and safety. Be sure to make and go to all appointments, and call your doctor if you are having problems. It's also a good idea to know your test results and keep a list of the medicines you take. How can you care for yourself at home? · Clean the area with soap and water 2 times a day unless your doctor gives you different instructions. Don't use hydrogen peroxide or alcohol, which can slow healing. ¨ You may cover the wound with a thin layer of antibiotic ointment, such as bacitracin, and a nonstick bandage. ¨ Apply more ointment and replace the bandage as needed. · Take pain medicines exactly as directed. Some pain is normal with a wound, but do not ignore pain that is getting worse instead of better. You could have an infection. ¨ If the doctor gave you a prescription medicine for pain, take it as prescribed. ¨ If you are not taking a prescription pain medicine, ask your doctor if you can take an over-the-counter medicine. · Your doctor may have closed your wound with stitches (sutures), staples, or skin glue. ¨ If you have stitches, your doctor may remove them after several days to 2 weeks. Or you may have stitches that dissolve on their own.   ¨ If you have staples, your doctor may remove them after 7 to 10 days. ¨ If your wound was closed with skin glue, the glue will wear off in a few days to 2 weeks. When should you call for help? Call your doctor now or seek immediate medical care if:  · You have signs of infection, such as:  ¨ Increased pain, swelling, warmth, or redness near the wound. ¨ Red streaks leading from the wound. ¨ Pus draining from the wound. ¨ A fever. · You bleed so much from your incision that you soak one or more bandages over 2 to 4 hours. Watch closely for changes in your health, and be sure to contact your doctor if:  · The wound is not getting better each day. Where can you learn more? Go to [employer].be  Enter M973 in the search box to learn more about \"Wound Care: After Your Visit. \"   © 9107-5349 [employer]. Care instructions adapted under license by Ashley See (which disclaims liability or warranty for this information). This care instruction is for use with your licensed healthcare professional. If you have questions about a medical condition or this instruction, always ask your healthcare professional. [place] [address] any warranty or liability for your use of this information. Content Version: 50.3.507941;  Last Revised: April 23, 2012

## 2017-11-15 NOTE — PROGRESS NOTES
History of Present Illness:     Chief Complaint   Patient presents with    Wound Check     patient have completed wound care center but fold under abdomen have began leaking again     Pt is a 28y.o. year old female    Presents to clinic for wound problem. 1 day ago noticed a small point (size of ball point pen) of oozing with clear drainage. Has been using her nystatin powder. Last course of antibiotics was in May. Previously treated at [address] [place] [address]; last visit on 10/30/17 and was released. Past Medical History:   Diagnosis Date    Anxiety disorder     Severe    Congestive heart failure, unspecified     Diabetes ([facility] [address]     Hypertension     Lymph edema     Obesity     Prader - Willi syndrome          Current Outpatient Prescriptions on File Prior to Visit   Medication Sig Dispense Refill    nystatin-triamcinolone (MYCOLOG II) topical cream APPLY EXTERNALLY TO THE AFFECTED AREA TWICE DAILY 240 g 0    pantoprazole (PROTONIX) 40 mg tablet Take 1 Tab by mouth daily. 30 Tab 11    silver sulfADIAZINE (SILVADENE) 1 % topical cream Apply  to affected area daily. Apply daily as directed 85 g 1    traMADol (ULTRAM) 50 mg tablet Take 1 Tab by mouth every six (6) hours as needed for Pain. Max Daily Amount: 200 mg. 30 Tab 0    insulin glargine (LANTUS,BASAGLAR) 100 unit/mL (3 mL) inpn by SubCUTAneous route two (2) times a day. 80mg am and 80mg pm.      insulin aspart (NOVOLOG FLEXPEN) 100 unit/mL inpn by SubCUTAneous route Before breakfast, lunch, dinner and at bedtime. Sliding scale      ALPRAZolam (XANAX) 0.25 mg tablet Take 1 Tab by mouth three (3) times daily as needed for Anxiety.  Max Daily Amount: 0.75 mg. 90 Tab 1    naftifine (NAFTIN) 1 % topical cream APPLY EXTERNALLY TO THE AFFECTED AREA EVERY DAY FOR 2 WEEKS OR AS DIRECTED 60 g 0    furosemide (LASIX) 20 mg tablet TAKE 1 TABLET BY MOUTH EVERY DAY 90 Tab 0    risperiDONE (RISPERDAL) 1 mg tablet TAKE 1 TABLET BY MOUTH TWICE DAILY (Patient taking differently: TAKE 1 TABLET BY IN THE MORNING AND 2 TABLETS AT BEDTIME) 60 Tab 0    mupirocin calcium (BACTROBAN) 2 % topical cream APPLY EXTERNALLY TO THE AFFECTED AREA EVERY DAY AS DIRECTED 30 g 0    hydrOXYzine (VISTARIL) 25 mg capsule TAKE 1 CAPSULE BY MOUTH THREE TIMES DAILY AS NEEDED FOR ITCHING 30 Cap 0    fexofenadine (ALLEGRA) 180 mg tablet Take 1 Tab by mouth daily. 30 Tab 11    fluticasone (FLONASE) 50 mcg/actuation nasal spray 2 Sprays by Both Nostrils route daily. 1 Bottle 6    fenofibrate nanocrystallized (TRICOR) 145 mg tablet TAKE 1 TABLET BY MOUTH DAILY 90 Tab 0    ammonium lactate (AMLACTIN) 12 % topical cream APPLY TO THE AFFECTED AREA AND RUB IN WELL TWICE DAILY 385 g 0    Adhesive Bandage (TENDRA MEPILEX BORDER) 6 X 8 \" bndg 1 Each by Apply Externally route daily. One, T14.8, 2x4cm open area with bloody exudate 60 Each 12    diclofenac (VOLTAREN) 1 % gel Apply  to affected area four (4) times daily. 1 Each 11     No current facility-administered medications on file prior to visit. Allergies: Allergies   Allergen Reactions    Bactrim [Sulfamethoprim Ds] Other (comments)     Nausea, abdominal pain and cramping      Byetta [Exenatide] Rash         Review of Systems:  Denies fever, chills, sweats (pt does not run fevers due to PW Syndrome)  Denies n/v/d    Objective:     Vitals:    11/15/17 1425   Pulse: 66   Resp: 19   Temp: 98 °F (36.7 °C)   TempSrc: Axillary   SpO2: 94%   Weight: 300 lb 9.6 oz (136.4 kg)   Height: 4' 7\" (1.397 m)       Physical Exam:  General appearance - alert, well appearing, and in no distress and morbidly obese, stigmata of Prader- Cedric sx. Skin - 1.5cm x 2.5 cm abdominal erythema with tiny central opening with serous drainage under panus. C/w prior wound. Recent Labs:  No results found for this or any previous visit (from the past 12 hour(s)). Assessment and Plan:   Pt is a 28y.o. year old female,      ICD-10-CM ICD-9-CM    1. Decubitus ulcer of other site, stage 2 L89.892 707.09 cephALEXin (KEFLEX) 500 mg capsule     707.22 REFERRAL TO WOUND CARE   2. Open wound T14. 8XXA 879.8 cephALEXin (KEFLEX) 500 mg capsule       Trial Bactroban ointment to area; mother has this at home    Keflex ordered in event that area worsens over the next 2-3 days    Referral to Wound Care placed    Follow up in 1 week if no improvement/ resolution    Savannah Zamorano MD      I have discussed the diagnosis with the patient and the intended plan as seen in the above orders. The patient has received an after-visit summary and questions were answered concerning future plans.

## 2017-11-15 NOTE — MR AVS SNAPSHOT
Visit Information Date & Time Provider Department Dept. Phone Encounter #  
 11/15/2017  2:30 PM Mariajose Varela, [facility] [phone] [id number] Follow-up Instructions Return in about 1 week (around 11/22/2017) for Wound follow up. Upcoming Health Maintenance Date Due HEMOGLOBIN A1C Q6M 12/13/2017 EYE EXAM RETINAL OR DILATED Q1 8/29/2018* FOOT EXAM Q1 3/24/2018 MICROALBUMIN Q1 6/13/2018 LIPID PANEL Q1 6/13/2018 DTaP/Tdap/Td series (2 - Td) 9/1/2026 *Topic was postponed. The date shown is not the original due date. Allergies as of 11/15/2017  Review Complete On: 11/15/2017 By: Jodie Melendrez Severity Noted Reaction Type Reactions Bactrim [Sulfamethoprim Ds]  11/09/2015    Other (comments) Nausea, abdominal pain and cramping Byetta [Exenatide]  10/21/2010    Rash Current Immunizations  Reviewed on 8/29/2017 Name Date Influenza Vaccine 10/20/2015, 10/8/2013 Influenza Vaccine (Quad) PF 8/29/2017, 9/8/2016 Influenza Vaccine PF 9/14/2012 Pneumococcal Conjugate (PCV-13) 5/5/2015 Pneumococcal Vaccine (Unspecified Type) 2/2/2011 TD Vaccine 12/2/2010 Not reviewed this visit You Were Diagnosed With   
  
 Codes Comments Decubitus ulcer of other site, stage 2    -  Primary ICD-10-CM: I14.312 ICD-9-CM: 707.09, 707.22 Open wound     ICD-10-CM: T14. Lauren Fidelia ICD-9-CM: 879.8 Vitals Pulse Temp Resp Height(growth percentile) Weight(growth percentile) SpO2  
 66 98 °F (36.7 °C) (Axillary) 19 4' 7\" (1.397 m) 300 lb 9.6 oz (136.4 kg) 94% BMI OB Status Smoking Status 69.87 kg/m2 Unknown Never Smoker Vitals History BMI and BSA Data Body Mass Index Body Surface Area  
 69.87 kg/m 2 2.3 m 2 Preferred Pharmacy Pharmacy Name Phone [facility] [address] AT [address] [address] [phone] Your Updated Medication List  
  
   
This list is accurate as of: 11/15/17  2:53 PM.  Always use your most recent med list.  
  
  
  
  
 Adhesive Bandage 6 X 8 \" Bndg Commonly known as:  Tendra Mepilex Border 1 Each by Apply Externally route daily. One, T14.8, 2x4cm open area with bloody exudate ALPRAZolam 0.25 mg tablet Commonly known as:  Cindy Fell Take 1 Tab by mouth three (3) times daily as needed for Anxiety. Max Daily Amount: 0.75 mg.  
  
 ammonium lactate 12 % topical cream  
Commonly known as:  AMLACTIN  
APPLY TO THE AFFECTED AREA AND RUB IN WELL TWICE DAILY  
  
 cephALEXin 500 mg capsule Commonly known as:  Deatrice Spann Take 1 Cap by mouth two (2) times a day for 7 days. diclofenac 1 % Gel Commonly known as:  VOLTAREN Apply  to affected area four (4) times daily. fenofibrate nanocrystallized 145 mg tablet Commonly known as:  TRICOR  
TAKE 1 TABLET BY MOUTH DAILY  
  
 fexofenadine 180 mg tablet Commonly known as:  Rock Holstein Take 1 Tab by mouth daily. fluticasone 50 mcg/actuation nasal spray Commonly known as:  Florencio Merles 2 Sprays by Both Nostrils route daily. furosemide 20 mg tablet Commonly known as:  LASIX TAKE 1 TABLET BY MOUTH EVERY DAY  
  
 hydrOXYzine pamoate 25 mg capsule Commonly known as:  VISTARIL  
TAKE 1 CAPSULE BY MOUTH THREE TIMES DAILY AS NEEDED FOR ITCHING  
  
 insulin glargine 100 unit/mL (3 mL) Inpn Commonly known as:  LANTUS,BASAGLAR  
by SubCUTAneous route two (2) times a day. 80mg am and 80mg pm.  
  
 mupirocin calcium 2 % topical cream  
Commonly known as:  BACTROBAN  
APPLY EXTERNALLY TO THE AFFECTED AREA EVERY DAY AS DIRECTED  
  
 naftifine 1 % topical cream  
Commonly known as:  NAFTIN  
APPLY EXTERNALLY TO THE AFFECTED AREA EVERY DAY FOR 2 WEEKS OR AS DIRECTED NovoLOG Flexpen 100 unit/mL Inpn Generic drug:  insulin aspart  
by SubCUTAneous route Before breakfast, lunch, dinner and at bedtime. Sliding scale nystatin-triamcinolone topical cream  
Commonly known as:  MYCOLOG II  
APPLY EXTERNALLY TO THE AFFECTED AREA TWICE DAILY pantoprazole 40 mg tablet Commonly known as:  PROTONIX Take 1 Tab by mouth daily. risperiDONE 1 mg tablet Commonly known as:  RisperDAL TAKE 1 TABLET BY MOUTH TWICE DAILY  
  
 silver sulfADIAZINE 1 % topical cream  
Commonly known as:  SILVADENE Apply  to affected area daily. Apply daily as directed  
  
 traMADol 50 mg tablet Commonly known as:  ULTRAM  
Take 1 Tab by mouth every six (6) hours as needed for Pain. Max Daily Amount: 200 mg. Prescriptions Sent to Pharmacy Refills  
 cephALEXin (KEFLEX) 500 mg capsule 0 Sig: Take 1 Cap by mouth two (2) times a day for 7 days. Class: Normal  
 Pharmacy: [facility] [address] [place] [address] [address] AT [facility] [address] ([facility] Ph #: [phone] Route: Oral  
  
We Performed the Following REFERRAL TO WOUND CARE [WXW288 Custom] Comments:  
 Please evaluate patient for abdominal wound. Follow-up Instructions Return in about 1 week (around 11/22/2017) for Wound follow up. Referral Information Referral ID Referred By Referred To  
  
 [id number] Hilton Leyden A Not Available Visits Status Start Date End Date 1 New Request 11/15/17 11/15/18 If your referral has a status of pending review or denied, additional information will be sent to support the outcome of this decision. Patient Instructions 1. Keep area clean and dry 2. Try applying Bactroban ointment twice daily to area 3. If more drainage or worsening redness/ swelling/ pain, start taking antibiotic that was sent to your pharmacy (Keflex / Cephalexin) 4. Return to clinic next week if no improvement to see Dr. Sudeep Shetty (tuesday afternoon) 5. A wound care referral was placed for you. Wound Care: After Your Visit Your Care Instructions Taking good care of your wound at home will help it heal quickly and reduce your chance of infection. The doctor has checked you carefully, but problems can develop later. If you notice any problems or new symptoms, get medical treatment right away. Follow-up care is a key part of your treatment and safety. Be sure to make and go to all appointments, and call your doctor if you are having problems. It's also a good idea to know your test results and keep a list of the medicines you take. How can you care for yourself at home? · Clean the area with soap and water 2 times a day unless your doctor gives you different instructions. Don't use hydrogen peroxide or alcohol, which can slow healing. ¨ You may cover the wound with a thin layer of antibiotic ointment, such as bacitracin, and a nonstick bandage. ¨ Apply more ointment and replace the bandage as needed. · Take pain medicines exactly as directed. Some pain is normal with a wound, but do not ignore pain that is getting worse instead of better. You could have an infection. ¨ If the doctor gave you a prescription medicine for pain, take it as prescribed. ¨ If you are not taking a prescription pain medicine, ask your doctor if you can take an over-the-counter medicine. · Your doctor may have closed your wound with stitches (sutures), staples, or skin glue. ¨ If you have stitches, your doctor may remove them after several days to 2 weeks. Or you may have stitches that dissolve on their own. ¨ If you have staples, your doctor may remove them after 7 to 10 days. ¨ If your wound was closed with skin glue, the glue will wear off in a few days to 2 weeks. When should you call for help? Call your doctor now or seek immediate medical care if: 
· You have signs of infection, such as: 
¨ Increased pain, swelling, warmth, or redness near the wound. ¨ Red streaks leading from the wound. ¨ Pus draining from the wound. ¨ A fever. · You bleed so much from your incision that you soak one or more bandages over 2 to 4 hours. Watch closely for changes in your health, and be sure to contact your doctor if: · The wound is not getting better each day. Where can you learn more? Go to [employer] Enter H006 in the search box to learn more about \"Wound Care: After Your Visit. \"  
© 1011-3221 Healthwise, Incorporated. Care instructions adapted under license by [employer] (which disclaims liability or warranty for this information). This care instruction is for use with your licensed healthcare professional. If you have questions about a medical condition or this instruction, always ask your healthcare professional. [address] any warranty or liability for your use of this information. Content Version: 69.3.142869; Last Revised: April 23, 2012 Introducing [facility] SERVICES! [employer] introduces [employer] patient portal. Now you can access parts of your medical record, email your doctor's office, and request medication refills online. 1. In your internet browser, go to https://"[employer]. Cancer Genetics/"[employer] 2. Click on the First Time User? Click Here link in the Sign In box. You will see the New Member Sign Up page. 3. Enter your [employer] Access Code exactly as it appears below. You will not need to use this code after youve completed the sign-up process. If you do not sign up before the expiration date, you must request a new code. · [employer] Access Code: [email or web address] Expires: 11/16/2017 10:48 AM 
 
4. Enter the last four digits of your Social Security Number (xxxx) and Date of Birth (mm/dd/yyyy) as indicated and click Submit. You will be taken to the next sign-up page. 5. Create a [employer] ID. This will be your [employer] login ID and cannot be changed, so think of one that is secure and easy to remember. 6. Create a [facility] password. You can change your password at any time. 7. Enter your Password Reset Question and Answer. This can be used at a later time if you forget your password. 8. Enter your e-mail address. You will receive e-mail notification when new information is available in [address] 9. Click Sign Up. You can now view and download portions of your medical record. 10. Click the Download Summary menu link to download a portable copy of your medical information. If you have questions, please visit the Frequently Asked Questions section of the [facility] website. Remember, [facility] is NOT to be used for urgent needs. For medical emergencies, dial 911. Now available from your iPhone and Android! Please provide this summary of care documentation to your next provider. Your primary care clinician is listed as Cristina Echeverria. If you have any questions after today's visit, please call [phone].

## 2017-11-20 ENCOUNTER — HOSPITAL ENCOUNTER (OUTPATIENT)
Dept: WOUND CARE | Age: 32
Discharge: HOME | End: 2017-11-20
Payer: MEDICARE

## 2017-11-20 PROCEDURE — 99212 OFFICE O/P EST SF 10 MIN: CPT

## 2017-11-20 NOTE — PROGRESS NOTES
I have noted, and reviewed today's data for this patient in [facility], and '[facility]' and concur with same. The focused physical exam, past history, review of symptoms and medications remains unchanged today except as noted below. Patient Report: There was some drainage from the wound area last week, Dr. Steven Sheriff antibiotic. Esperanza Obando Lesion/Wound, focused exam on Presentation today: ulcer in the pannus remains closed and dry. .    Follow up plan: May resume swimming. Bathing, post these events be sure to blot dry and use absorbant pad in the pannus afterward to avoid shear and maceration.

## 2017-12-05 RX ORDER — NYSTATIN AND TRIAMCINOLONE ACETONIDE 100000; 1 [USP'U]/G; MG/G
CREAM TOPICAL
Qty: 240 G | Refills: 0 | Status: SHIPPED | OUTPATIENT
Start: 2017-12-05 | End: 2018-01-08 | Stop reason: SDUPTHER

## 2017-12-05 RX ORDER — AMMONIUM LACTATE 12 G/100G
CREAM TOPICAL
Qty: 385 G | Refills: 0 | Status: SHIPPED | OUTPATIENT
Start: 2017-12-05 | End: 2018-01-08 | Stop reason: SDUPTHER

## 2017-12-19 ENCOUNTER — TELEPHONE (OUTPATIENT)
Dept: FAMILY MEDICINE CLINIC | Age: 32
End: 2017-12-19

## 2017-12-19 NOTE — TELEPHONE ENCOUNTER
Per call from patient mother, states that Dr. Matheus Urrutia agreed to take over patient medications for imipramine 25 mg and also risperidone 1mg. Patient states Dr. Matheus Urrutia agreed to take over these medications as Dr. Hines Files no longer accepting her insurance. Patient mother just sending a FYI as pharmacy may send request over.     thanks

## 2017-12-21 RX ORDER — RISPERIDONE 1 MG/1
TABLET, FILM COATED ORAL
Qty: 60 TAB | Refills: 0 | Status: SHIPPED | OUTPATIENT
Start: 2017-12-21 | End: 2018-01-08 | Stop reason: SDUPTHER

## 2017-12-21 RX ORDER — IMIPRAMINE HYDROCHLORIDE 25 MG/1
25 TABLET ORAL
Qty: 30 TAB | Refills: 1 | Status: SHIPPED | OUTPATIENT
Start: 2017-12-21 | End: 2017-12-27 | Stop reason: SDUPTHER

## 2017-12-21 NOTE — TELEPHONE ENCOUNTER
Dr. Zhanna Peres    Call patient Received: Today       Jared CERVANTES [facility] Front Office                     Rayray Scott, Pt's Mother, is requesting a call in regards to the status of the pt's medication, Risperdal 1 mg.  Best contact number [phone].

## 2017-12-27 ENCOUNTER — TELEPHONE (OUTPATIENT)
Dept: FAMILY MEDICINE CLINIC | Age: 32
End: 2017-12-27

## 2017-12-27 RX ORDER — IMIPRAMINE HYDROCHLORIDE 25 MG/1
TABLET ORAL
Qty: 90 TAB | Refills: 0 | Status: SHIPPED | OUTPATIENT
Start: 2017-12-27 | End: 2018-01-22

## 2017-12-27 NOTE — TELEPHONE ENCOUNTER
----- Message from Niles Lin sent at 12/27/2017 12:55 PM EST -----  Regarding: Dr. Percy Santos [facility]) of the pt stated the pt is in need of a refill correction on \"Imipramine-25mg tab\", pt is in need of 90 tablets a month instead was giving 30 tablets, due to the pt taking 3 a night. Best Contact [phone].

## 2018-01-08 RX ORDER — NYSTATIN AND TRIAMCINOLONE ACETONIDE 100000; 1 [USP'U]/G; MG/G
CREAM TOPICAL
Qty: 240 G | Refills: 0 | Status: SHIPPED | OUTPATIENT
Start: 2018-01-08 | End: 2018-02-05 | Stop reason: SDUPTHER

## 2018-01-08 RX ORDER — RISPERIDONE 1 MG/1
TABLET, FILM COATED ORAL
Qty: 60 TAB | Refills: 0 | Status: SHIPPED | OUTPATIENT
Start: 2018-01-08 | End: 2018-02-05 | Stop reason: SDUPTHER

## 2018-01-08 RX ORDER — AMMONIUM LACTATE 12 G/100G
CREAM TOPICAL
Qty: 385 G | Refills: 0 | Status: SHIPPED | OUTPATIENT
Start: 2018-01-08 | End: 2018-02-05 | Stop reason: SDUPTHER

## 2018-01-22 RX ORDER — IMIPRAMINE HYDROCHLORIDE 25 MG/1
TABLET ORAL
Qty: 30 TAB | Refills: 0 | Status: SHIPPED | OUTPATIENT
Start: 2018-01-22 | End: 2018-02-21 | Stop reason: SDUPTHER

## 2018-01-29 ENCOUNTER — DOCUMENTATION ONLY (OUTPATIENT)
Dept: SLEEP MEDICINE | Age: 33
End: 2018-01-29

## 2018-01-29 ENCOUNTER — TELEPHONE (OUTPATIENT)
Dept: SLEEP MEDICINE | Age: 33
End: 2018-01-29

## 2018-01-29 DIAGNOSIS — G47.33 OSA (OBSTRUCTIVE SLEEP APNEA): Primary | ICD-10-CM

## 2018-01-29 NOTE — PROGRESS NOTES
Patients mother called advised cpap not working requested repair/replace order and that all new orders be sent to [facility].

## 2018-01-29 NOTE — PROGRESS NOTES
Denzel Richardson at [facility] she will have someone reach out to patients mother Delmas Cos today about order and the processes.

## 2018-01-29 NOTE — PROGRESS NOTES
Patients Mother called,wanted to know if cpap repair/replace order available. Advised yes the order is ready and will have MA get all needed information together and have it forwarded to [address]..

## 2018-01-29 NOTE — TELEPHONE ENCOUNTER
Orders Placed This Encounter    AMB SUPPLY ORDER     Diagnosis: Obstructive Sleep Apnea 327.23    Service and Repair patient PAP device. Replace if damaged beyond repair. Device pressure set to CPAP  8 cmH2O. Provide patient with CPAP mask / cushion and other supplies as needed. Markus Hess MD, FAASM  Electronically signed.  01/29/18

## 2018-01-29 NOTE — TELEPHONE ENCOUNTER
Patients mother called advised cpa stopped working last night, requested repair/peplace cpap order. Schedule yearly follow up 5/21/2018 was a Dr. Kristal Moreno patient. Patient has special needs advised  By mother.

## 2018-02-05 RX ORDER — RISPERIDONE 1 MG/1
TABLET, FILM COATED ORAL
Qty: 90 TAB | Refills: 0 | Status: SHIPPED | OUTPATIENT
Start: 2018-02-05 | End: 2018-03-02 | Stop reason: SDUPTHER

## 2018-02-05 RX ORDER — AMMONIUM LACTATE 12 G/100G
CREAM TOPICAL
Qty: 385 G | Refills: 0 | Status: SHIPPED | OUTPATIENT
Start: 2018-02-05 | End: 2018-03-02 | Stop reason: SDUPTHER

## 2018-02-05 RX ORDER — NYSTATIN AND TRIAMCINOLONE ACETONIDE 100000; 1 [USP'U]/G; MG/G
CREAM TOPICAL
Qty: 240 G | Refills: 0 | Status: SHIPPED | OUTPATIENT
Start: 2018-02-05 | End: 2018-03-02 | Stop reason: SDUPTHER

## 2018-02-06 ENCOUNTER — TELEPHONE (OUTPATIENT)
Dept: FAMILY MEDICINE CLINIC | Age: 33
End: 2018-02-06

## 2018-02-06 ENCOUNTER — TELEPHONE (OUTPATIENT)
Dept: SLEEP MEDICINE | Age: 33
End: 2018-02-06

## 2018-02-06 NOTE — TELEPHONE ENCOUNTER
As [facility] is bought by [address], spoke with [place] [employer] at [address]. Explained to her the need for repair/replace and another DME was not able to help as [facility] was the issuing DME. She verified they have patient information. She said to forward the order and see how they can process the order. Order faxed to [address].

## 2018-02-06 NOTE — TELEPHONE ENCOUNTER
Spoke to mom and gave her update. Encouraged her to call [facility] daily to check status and we will do the same to try to expedite the process. She was very appreciative of the assistance we have extended to her.

## 2018-02-06 NOTE — TELEPHONE ENCOUNTER
Patient's mother Milad Umanzor) called on behalf of patient. She stated she [facility] told her that she would not be able to get machine and waited 1 and half week to let her know that. I Called and Spoke with Soraida Marie. She stated they were not able to process her order because her machine was issued from [address] and she would be eligible next year only. I will send the order to [facility] as [address] Patient has been bought by [facility] and see if they can process the order for her.

## 2018-02-08 ENCOUNTER — DOCUMENTATION ONLY (OUTPATIENT)
Dept: SLEEP MEDICINE | Age: 33
End: 2018-02-08

## 2018-02-20 ENCOUNTER — TELEPHONE (OUTPATIENT)
Dept: FAMILY MEDICINE CLINIC | Age: 33
End: 2018-02-20

## 2018-02-20 NOTE — TELEPHONE ENCOUNTER
----- Message from Dano Thorne sent at 2/20/2018  5:06 PM EST -----  Regarding: Dr. Ben Nielsen with [facility] (used to be [address] [place] [address]) advises the Impirimine 25mg tablets have the wrong daily dosage. Needs to be written as \"3 tablets at bedtime\" instead of \"one tablet at bedtime\". Please call to advise at [phone].

## 2018-02-21 RX ORDER — IMIPRAMINE HYDROCHLORIDE 25 MG/1
TABLET ORAL
Qty: 30 TAB | Refills: 0 | Status: SHIPPED | OUTPATIENT
Start: 2018-02-21 | End: 2018-02-23 | Stop reason: SDUPTHER

## 2018-02-22 NOTE — TELEPHONE ENCOUNTER
Karly Bo with [facility] (used to be [facility]) advises the Impirimine 25mg tablets have the wrong daily dosage. Needs to be written as \"3 tablets at bedtime\" instead of \"one tablet at bedtime\". Please call to advise at [phone].     I don;t have this on file    Have them come in to review what medicines she is on

## 2018-02-23 ENCOUNTER — OFFICE VISIT (OUTPATIENT)
Dept: FAMILY MEDICINE CLINIC | Age: 33
End: 2018-02-23

## 2018-02-23 VITALS
RESPIRATION RATE: 20 BRPM | BODY MASS INDEX: 67.81 KG/M2 | OXYGEN SATURATION: 95 % | TEMPERATURE: 96.9 F | HEART RATE: 107 BPM | HEIGHT: 55 IN | WEIGHT: 293 LBS

## 2018-02-23 DIAGNOSIS — F41.1 GENERALIZED ANXIETY DISORDER: Primary | ICD-10-CM

## 2018-02-23 PROBLEM — E66.01 OBESITY, MORBID (HCC): Status: ACTIVE | Noted: 2018-02-23

## 2018-02-23 RX ORDER — IMIPRAMINE HYDROCHLORIDE 25 MG/1
75 TABLET ORAL
Qty: 90 TAB | Refills: 2 | Status: SHIPPED | OUTPATIENT
Start: 2018-02-23 | End: 2018-05-08 | Stop reason: SDUPTHER

## 2018-02-23 NOTE — TELEPHONE ENCOUNTER
Left message to call office. Please schedule an appointment to discuss Impirimine 25mg tablets dosage.

## 2018-02-23 NOTE — PROGRESS NOTES
History of Present Illness:     Chief Complaint   Patient presents with    Follow-up     medication     Pt is a 35y.o. 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 Presents to clinic for medication refill. SHONDA - Was followed by Dr. Snehal Luevano, but can no longer see him due to insurance. Taking Imipramine 75mg every bedtime. Works well for both her anxiety and bedwetting. Chronic wound under panus - Went back to wound care. Told to use Nystatin powder to keep area dry. Doing well. She needs medical equipment. Her lift chair she uses at home for bath time recently broke. Her  will be sending the company request form. Past Medical History:   Diagnosis Date    Anxiety disorder     Severe    Congestive heart failure, unspecified     Diabetes ([facility] [address]     Hypertension     Lymph edema     Obesity     Prader - Willi syndrome          Current Outpatient Prescriptions on File Prior to Visit   Medication Sig Dispense Refill    ALPRAZolam (XANAX) 0.25 mg tablet TAKE 1 TABLET BY MOUTH THREE TIMES DAILY AS NEEDED FOR ANXIETY 90 Tab 0    naftifine (NAFTIN) 1 % topical cream APPLY EXTERNALLY TO THE AFFECTED AREA EVERY DAY FOR 2 WEEKS OR AS DIRECTED 60 g 0    ammonium lactate (AMLACTIN) 12 % topical cream APPLY TO THE AFFECTED AREA AND RUB IN WELL TWICE DAILY 385 g 0    nystatin-triamcinolone (MYCOLOG II) topical cream APPLY EXTERNALLY TO THE AFFECTED AREA TWICE DAILY 240 g 0    risperiDONE (RISPERDAL) 1 mg tablet TAKE 1 TABLET BY MOUTH EVERY MORNING AND 2 TABLETS AT BEDTIME 90 Tab 0    pantoprazole (PROTONIX) 40 mg tablet Take 1 Tab by mouth daily. 30 Tab 11    silver sulfADIAZINE (SILVADENE) 1 % topical cream Apply  to affected area daily. Apply daily as directed 85 g 1    insulin glargine (LANTUS,BASAGLAR) 100 unit/mL (3 mL) inpn by SubCUTAneous route two (2) times a day.  80mg am and 80mg pm.  insulin aspart (NOVOLOG FLEXPEN) 100 unit/mL inpn by SubCUTAneous route Before breakfast, lunch, dinner and at bedtime. Sliding scale      furosemide (LASIX) 20 mg tablet TAKE 1 TABLET BY MOUTH EVERY DAY 90 Tab 0    Adhesive Bandage (TENDRA MEPILEX BORDER) 6 X 8 \" bndg 1 Each by Apply Externally route daily. One, T14.8, 2x4cm open area with bloody exudate 60 Each 12    diclofenac (VOLTAREN) 1 % gel Apply  to affected area four (4) times daily. 1 Each 11    mupirocin calcium (BACTROBAN) 2 % topical cream APPLY EXTERNALLY TO THE AFFECTED AREA EVERY DAY AS DIRECTED 30 g 0    hydrOXYzine (VISTARIL) 25 mg capsule TAKE 1 CAPSULE BY MOUTH THREE TIMES DAILY AS NEEDED FOR ITCHING 30 Cap 0    fluticasone (FLONASE) 50 mcg/actuation nasal spray 2 Sprays by Both Nostrils route daily. 1 Bottle 6    fenofibrate nanocrystallized (TRICOR) 145 mg tablet TAKE 1 TABLET BY MOUTH DAILY 90 Tab 0     No current facility-administered medications on file prior to visit. Allergies: Allergies   Allergen Reactions    Bactrim [Sulfamethoprim Ds] Other (comments)     Nausea, abdominal pain and cramping      Byetta [Exenatide] Rash         Review of Systems (obtained per mother):  Denies fever, chills, sweats  Denies rash or skin break down      Objective:     Vitals:    02/23/18 0935   Pulse: (!) 107   Resp: 20   Temp: 96.9 °F (36.1 °C)   TempSrc: Oral   SpO2: 95%   Weight: 304 lb 6.4 oz (138.1 kg)   Height: 4' 7\" (1.397 m)       Physical Exam:  General appearance - alert, well appearing, and in no distress and morbidly obese. Skin - normal coloration and turgor, no rashes, no suspicious skin lesions noted. Ulceration under panus resolved. Skin intact with no breakdown. Recent Labs:  No results found for this or any previous visit (from the past 12 hour(s)). Assessment and Plan:   Pt is a 35y.o. year old female,      ICD-10-CM ICD-9-CM    1.  Generalized anxiety disorder F41.1 300.02 imipramine (TOFRANIL) 25 mg tablet     Refilled Imipramine; parent prefers brand Sandoz; noted in script. Will look out for equipment request forms    Follow up PRN. Has routine labs with nephrologist in June. Will look out for those. Mei Mobley MD      I have discussed the diagnosis with the patient and the intended plan as seen in the above orders. The patient has received an after-visit summary and questions were answered concerning future plans.

## 2018-02-23 NOTE — MR AVS SNAPSHOT
[address] [phone] Patient: Nathaniel Hilton MRN: [id number] AET:3/25/[birth year]01 Visit Information Date & Time Provider Department Dept. Phone Encounter #  
 2/23/2018  9:30 AM Briseyda Rene, Marty Nieves [address] [place] [address] [phone] [id number] Follow-up Instructions Return if symptoms worsen or fail to improve. Your Appointments 3/19/2018  2:40 PM  
Any with Sky Carlton MD  
[address] ([address]) Appt Note: yearly follow up.; Dr. Patrizia Harper patient - last seen 5/2017  
 [address] [facility] [address] [phone] [facility] [phone] Upcoming Health Maintenance Date Due  
 EYE EXAM RETINAL OR DILATED Q1 8/29/2018* FOOT EXAM Q1 3/24/2018 HEMOGLOBIN A1C Q6M 5/13/2018 MICROALBUMIN Q1 11/13/2018 LIPID PANEL Q1 11/14/2018 DTaP/Tdap/Td series (2 - Td) 9/1/2026 *Topic was postponed. The date shown is not the original due date. Allergies as of 2/23/2018  Review Complete On: 2/23/2018 By: Myrna Simpson Severity Noted Reaction Type Reactions Bactrim [Sulfamethoprim Ds]  11/09/2015    Other (comments) Nausea, abdominal pain and cramping Byetta [Exenatide]  10/21/2010    Rash Current Immunizations  Reviewed on 8/29/2017 Name Date Influenza Vaccine 10/20/2015, 10/8/2013 Influenza Vaccine (Quad) PF 8/29/2017, 9/8/2016 Influenza Vaccine PF 9/14/2012 Pneumococcal Conjugate (PCV-13) 5/5/2015 Pneumococcal Vaccine (Unspecified Type) 2/2/2011 TD Vaccine 12/2/2010 Not reviewed this visit You Were Diagnosed With   
  
 Codes Comments Generalized anxiety disorder    -  Primary ICD-10-CM: F41.1 ICD-9-CM: 300.02 Vitals Pulse Temp Resp Height(growth percentile) Weight(growth percentile) SpO2 (!) 107 96.9 °F (36.1 °C) (Oral) 20 4' 7\" (1.397 m) 304 lb 6.4 oz (138.1 kg) 95% BMI OB Status Smoking Status 70.75 kg/m2 Unknown Never Smoker Vitals History BMI and BSA Data Body Mass Index Body Surface Area 70.75 kg/m 2 2.31 m 2 Preferred Pharmacy Pharmacy Name Phone [address], [address] [place] [address] [phone] Your Updated Medication List  
  
   
This list is accurate as of 2/23/18 10:13 AM.  Always use your most recent med list.  
  
  
  
  
 Adhesive Bandage 6 X 8 \" Bndg Commonly known as:  Tendra Mepilex Border 1 Each by Apply Externally route daily. One, T14.8, 2x4cm open area with bloody exudate ALPRAZolam 0.25 mg tablet Commonly known as:  XANAX  
TAKE 1 TABLET BY MOUTH THREE TIMES DAILY AS NEEDED FOR ANXIETY  
  
 ammonium lactate 12 % topical cream  
Commonly known as:  AMLACTIN  
APPLY TO THE AFFECTED AREA AND RUB IN WELL TWICE DAILY  
  
 diclofenac 1 % Gel Commonly known as:  VOLTAREN Apply  to affected area four (4) times daily. fenofibrate nanocrystallized 145 mg tablet Commonly known as:  TRICOR  
TAKE 1 TABLET BY MOUTH DAILY  
  
 fluticasone 50 mcg/actuation nasal spray Commonly known as:  Liana Radhames 2 Sprays by Both Nostrils route daily. furosemide 20 mg tablet Commonly known as:  LASIX TAKE 1 TABLET BY MOUTH EVERY DAY  
  
 hydrOXYzine pamoate 25 mg capsule Commonly known as:  VISTARIL  
TAKE 1 CAPSULE BY MOUTH THREE TIMES DAILY AS NEEDED FOR ITCHING  
  
 imipramine 25 mg tablet Commonly known as:  TOFRANIL Take 3 Tabs by mouth nightly for 90 days. insulin glargine 100 unit/mL (3 mL) Inpn Commonly known as:  LANTUS,BASAGLAR  
by SubCUTAneous route two (2) times a day.  80mg am and 80mg pm.  
  
 mupirocin calcium 2 % topical cream  
Commonly known as:  BACTROBAN  
APPLY EXTERNALLY TO THE AFFECTED AREA EVERY DAY AS DIRECTED  
  
 naftifine 1 % topical cream  
Commonly known as:  NAFTIN  
APPLY EXTERNALLY TO THE AFFECTED AREA EVERY DAY FOR 2 WEEKS OR AS DIRECTED NovoLOG Flexpen U-100 Insulin 100 unit/mL Inpn Generic drug:  insulin aspart U-100  
by SubCUTAneous route Before breakfast, lunch, dinner and at bedtime. Sliding scale  
  
 nystatin-triamcinolone topical cream  
Commonly known as:  MYCOLOG II  
APPLY EXTERNALLY TO THE AFFECTED AREA TWICE DAILY pantoprazole 40 mg tablet Commonly known as:  PROTONIX Take 1 Tab by mouth daily. risperiDONE 1 mg tablet Commonly known as:  RisperDAL TAKE 1 TABLET BY MOUTH EVERY MORNING AND 2 TABLETS AT BEDTIME  
  
 silver sulfADIAZINE 1 % topical cream  
Commonly known as:  SILVADENE Apply  to affected area daily. Apply daily as directed Prescriptions Sent to Pharmacy Refills  
 imipramine (TOFRANIL) 25 mg tablet 2 Sig: Take 3 Tabs by mouth nightly for 90 days. Class: Normal  
 Pharmacy: [facility] [id number] #: [phone] Route: Oral  
  
Follow-up Instructions Return if symptoms worsen or fail to improve. Introducing [facility] & HEALTH SERVICES! Rosemarie Rojo introduces [employer] patient portal. Now you can access parts of your medical record, email your doctor's office, and request medication refills online. 1. In your internet browser, go to [email or web address] 2. Click on the First Time User? Click Here link in the Sign In box. You will see the New Member Sign Up page. 3. Enter your [employer] Access Code exactly as it appears below. You will not need to use this code after youve completed the sign-up process. If you do not sign up before the expiration date, you must request a new code. · [employer] Access Code: [email or web address] Expires: 5/24/2018 10:13 AM 
 
4.  Enter the last four digits of your Social Security Number (xxxx) and Date of Birth (mm/dd/yyyy) as indicated and click Submit. You will be taken to the next sign-up page. 5. Create a [employer] ID. This will be your [employer] login ID and cannot be changed, so think of one that is secure and easy to remember. 6. Create a [employer] password. You can change your password at any time. 7. Enter your Password Reset Question and Answer. This can be used at a later time if you forget your password. 8. Enter your e-mail address. You will receive e-mail notification when new information is available in [address] 9. Click Sign Up. You can now view and download portions of your medical record. 10. Click the Download Summary menu link to download a portable copy of your medical information. If you have questions, please visit the Frequently Asked Questions section of the [employer] website. Remember, [employer] is NOT to be used for urgent needs. For medical emergencies, dial 911. Now available from your iPhone and Android! Please provide this summary of care documentation to your next provider. Your primary care clinician is listed as Jacques Samuel. If you have any questions after today's visit, please call [phone].

## 2018-02-23 NOTE — PROGRESS NOTES
Chief Complaint   Patient presents with    Follow-up     medication           1. Have you been to the ER, urgent care clinic since your last visit? Hospitalized since your last visit? No    2. Have you seen or consulted any other health care providers outside of the [address] [place] [address] since your last visit? Include any pap smears or colon screening.  No

## 2018-02-26 ENCOUNTER — TELEPHONE (OUTPATIENT)
Dept: FAMILY MEDICINE CLINIC | Age: 33
End: 2018-02-26

## 2018-02-26 NOTE — TELEPHONE ENCOUNTER
----- Message from Foreign López sent at 2/26/2018  7:14 AM EST -----  Regarding: /telephone  Pt is requesting a call back from Baron Moore. Best contact [phone]

## 2018-02-28 ENCOUNTER — DOCUMENTATION ONLY (OUTPATIENT)
Dept: FAMILY MEDICINE CLINIC | Age: 33
End: 2018-02-28

## 2018-02-28 NOTE — PROGRESS NOTES
Prior authorization for patient medication Naftifine HCI has been submitted, at time of note, awaiting response.

## 2018-03-05 RX ORDER — RISPERIDONE 1 MG/1
TABLET, FILM COATED ORAL
Qty: 90 TAB | Refills: 0 | Status: SHIPPED | OUTPATIENT
Start: 2018-03-05 | End: 2018-03-13 | Stop reason: SDUPTHER

## 2018-03-05 RX ORDER — AMMONIUM LACTATE 12 G/100G
CREAM TOPICAL
Qty: 280 G | Refills: 0 | Status: SHIPPED | OUTPATIENT
Start: 2018-03-05 | End: 2018-04-03 | Stop reason: SDUPTHER

## 2018-03-05 RX ORDER — NYSTATIN AND TRIAMCINOLONE ACETONIDE 100000; 1 [USP'U]/G; MG/G
CREAM TOPICAL
Qty: 240 G | Refills: 0 | Status: SHIPPED | OUTPATIENT
Start: 2018-03-05 | End: 2018-04-03 | Stop reason: SDUPTHER

## 2018-03-13 ENCOUNTER — OFFICE VISIT (OUTPATIENT)
Dept: FAMILY MEDICINE CLINIC | Age: 33
End: 2018-03-13

## 2018-03-13 VITALS
BODY MASS INDEX: 67.81 KG/M2 | HEIGHT: 55 IN | HEART RATE: 100 BPM | RESPIRATION RATE: 18 BRPM | OXYGEN SATURATION: 95 % | TEMPERATURE: 98.5 F | WEIGHT: 293 LBS

## 2018-03-13 DIAGNOSIS — Q87.11 PRADER-WILLI SYNDROME: ICD-10-CM

## 2018-03-13 DIAGNOSIS — L84 CALLUS OF FOOT: ICD-10-CM

## 2018-03-13 DIAGNOSIS — E11.65 TYPE 2 DIABETES MELLITUS WITH HYPERGLYCEMIA, UNSPECIFIED LONG TERM INSULIN USE STATUS: ICD-10-CM

## 2018-03-13 DIAGNOSIS — F29 PSYCHOSIS, UNSPECIFIED PSYCHOSIS TYPE (HCC): Primary | ICD-10-CM

## 2018-03-13 DIAGNOSIS — I50.9 CONGESTIVE HEART FAILURE, UNSPECIFIED CONGESTIVE HEART FAILURE CHRONICITY, UNSPECIFIED CONGESTIVE HEART FAILURE TYPE: ICD-10-CM

## 2018-03-13 DIAGNOSIS — E66.01 OBESITY, MORBID (HCC): ICD-10-CM

## 2018-03-13 RX ORDER — RISPERIDONE 1 MG/1
2 TABLET, FILM COATED ORAL 2 TIMES DAILY
Qty: 360 TAB | Refills: 3 | Status: SHIPPED | OUTPATIENT
Start: 2018-03-13 | End: 2018-06-20 | Stop reason: SDUPTHER

## 2018-03-13 NOTE — PATIENT INSTRUCTIONS
Increase risperdal to 2 pills twice daily    See Dr. Rox Mosqueda for feet    Please call Anant Acuna to help arrange and authorize any tests and/or referrals.   Her # is [phone]

## 2018-03-13 NOTE — PROGRESS NOTES
Greater than 50% of today's 15 minute visit was counseling or coordination of care for the following reasons:    See diagnoses and orders, see patient instructions    Patient hearing voices again. Will adjust her risperdal    Diabetic foot exam:      Sensation by vibration sense:  good  Monofilament test:  good  Skin integrity:  She has some calluses that need attention  Vascular:  good circulation  Motor:  moves all toes, strength seems ok  No onychomycosis  She sees Dr. Tesfaye Meza podiatry      Diagnoses and all orders for this visit:    1. Psychosis, unspecified psychosis type  Comments:  adjust Rx  Orders:  -     risperiDONE (RISPERDAL) 1 mg tablet; Take 2 Tabs by mouth two (2) times a day. Indications: psychosis    2. Type 2 diabetes mellitus with hyperglycemia, unspecified long term insulin use status (HCC)  -     REFERRAL TO PODIATRY  -      DIABETES FOOT EXAM    3. Callus of foot  -     REFERRAL TO PODIATRY  -      DIABETES FOOT EXAM    4. Congestive heart failure, unspecified congestive heart failure chronicity, unspecified congestive heart failure type (HCC)  Comments:  stable, she has seen Dr. Gaurav Padilla in the past  Assessment & Plan:  She sees Dr. Gaurav Padilla cardiologist  Key CAD CHF Meds             furosemide (LASIX) 20 mg tablet  (Taking) TAKE 1 TABLET BY MOUTH EVERY DAY        Key Antihyperlipidemia Meds             fenofibrate nanocrystallized (TRICOR) 145 mg tablet  (Taking) TAKE 1 TABLET BY MOUTH DAILY        Lab Results   Component Value Date/Time    Sodium CANCELED 06/13/2017 11:54 AM    Potassium CANCELED 06/13/2017 11:54 AM    Cholesterol, total CANCELED 06/13/2017 11:54 AM    HDL Cholesterol CANCELED 06/13/2017 11:54 AM    LDL,Direct 107 06/13/2017 11:54 AM         5.  Obesity, morbid ([facility] [address]  Comments:  Prader-Willi syndrome  Assessment & Plan:  Uncontrolled, based on history, physical exam and review of pertinent labs, studies and medications; meds reconciled; continue current treatment plan, lifestyle modifications recommended.   Key Obesity Meds             furosemide (LASIX) 20 mg tablet  (Taking) TAKE 1 TABLET BY MOUTH EVERY DAY        Lab Results   Component Value Date/Time    Hemoglobin A1c 8.6 06/13/2017 11:54 AM    Glucose CANCELED 06/13/2017 11:54 AM    Cholesterol, total CANCELED 06/13/2017 11:54 AM    HDL Cholesterol CANCELED 06/13/2017 11:54 AM    LDL,Direct 107 06/13/2017 11:54 AM    Sodium CANCELED 06/13/2017 11:54 AM    Potassium CANCELED 06/13/2017 11:54 AM    ALT (SGPT) CANCELED 06/13/2017 11:54 AM    VITAMIN D, 25-HYDROXY CANCELED 06/13/2017 11:54 AM             6. Prader-Willi syndrome    I spoke with patient and her mom

## 2018-03-13 NOTE — ASSESSMENT & PLAN NOTE
She sees Dr. Nataly Wei cardiologist  Key CAD CHF Meds             furosemide (LASIX) 20 mg tablet  (Taking) TAKE 1 TABLET BY MOUTH EVERY DAY        Key Antihyperlipidemia Meds             fenofibrate nanocrystallized (TRICOR) 145 mg tablet  (Taking) TAKE 1 TABLET BY MOUTH DAILY        Lab Results   Component Value Date/Time    Sodium CANCELED 06/13/2017 11:54 AM    Potassium CANCELED 06/13/2017 11:54 AM    Cholesterol, total CANCELED 06/13/2017 11:54 AM    HDL Cholesterol CANCELED 06/13/2017 11:54 AM    LDL,Direct 107 06/13/2017 11:54 AM

## 2018-03-13 NOTE — ASSESSMENT & PLAN NOTE
Uncontrolled, based on history, physical exam and review of pertinent labs, studies and medications; meds reconciled; continue current treatment plan, lifestyle modifications recommended.   Key Obesity Meds             furosemide (LASIX) 20 mg tablet  (Taking) TAKE 1 TABLET BY MOUTH EVERY DAY        Lab Results   Component Value Date/Time    Hemoglobin A1c 8.6 06/13/2017 11:54 AM    Glucose CANCELED 06/13/2017 11:54 AM    Cholesterol, total CANCELED 06/13/2017 11:54 AM    HDL Cholesterol CANCELED 06/13/2017 11:54 AM    LDL,Direct 107 06/13/2017 11:54 AM    Sodium CANCELED 06/13/2017 11:54 AM    Potassium CANCELED 06/13/2017 11:54 AM    ALT (SGPT) CANCELED 06/13/2017 11:54 AM    VITAMIN D, 25-HYDROXY CANCELED 06/13/2017 11:54 AM

## 2018-03-13 NOTE — MR AVS SNAPSHOT
[address] [phone] Patient: Steve Li MRN: [id number] KOB:[birth year] Visit Information Date & Time Provider Department Dept. Phone Encounter #  
 3/13/2018 10:50 AM Bill Dixon MD [facility] [phone] [id number] Your Appointments 3/19/2018  2:40 PM  
Any with Rigoberto Yadav MD  
[address] ([address]) Appt Note: yearly follow up.; Dr. Pal Cardona patient - last seen 5/2017  
 [address] [phone] [facility] [phone] Upcoming Health Maintenance Date Due  
 FOOT EXAM Q1 3/24/2018 EYE EXAM RETINAL OR DILATED Q1 8/29/2018* HEMOGLOBIN A1C Q6M 5/14/2018 MICROALBUMIN Q1 11/14/2018 LIPID PANEL Q1 11/14/2018 DTaP/Tdap/Td series (2 - Td) 9/1/2026 *Topic was postponed. The date shown is not the original due date. Allergies as of 3/13/2018  Review Complete On: 3/13/2018 By: Bill Dixon MD  
  
 Severity Noted Reaction Type Reactions Bactrim [Sulfamethoprim Ds]  11/09/2015    Other (comments) Nausea, abdominal pain and cramping Byetta [Exenatide]  10/21/2010    Rash Current Immunizations  Reviewed on 8/29/2017 Name Date Influenza Vaccine 10/20/2015, 10/8/2013 Influenza Vaccine (Quad) PF 8/29/2017, 9/8/2016 Influenza Vaccine PF 9/14/2012 Pneumococcal Conjugate (PCV-13) 5/5/2015 Pneumococcal Vaccine (Unspecified Type) 2/2/2011 TD Vaccine 12/2/2010 Not reviewed this visit You Were Diagnosed With   
  
 Codes Comments Psychosis, unspecified psychosis type    -  Primary ICD-10-CM: F29 
ICD-9-CM: 298.9 Type 2 diabetes mellitus with hyperglycemia, unspecified long term insulin use status (HCC)     ICD-10-CM: E11.65 ICD-9-CM: 250.00 Callus of foot     ICD-10-CM: L84 
ICD-9-CM: 978 Vitals Pulse Temp Resp Height(growth percentile) Weight(growth percentile) SpO2  
 100 98.5 °F (36.9 °C) (Oral) 18 4' 7\" (1.397 m) 300 lb (136.1 kg) 95% BMI OB Status Smoking Status 69.73 kg/m2 Unknown Never Smoker BMI and BSA Data Body Mass Index Body Surface Area  
 69.73 kg/m 2 2.3 m 2 Preferred Pharmacy Pharmacy Name Phone [facility] [address]  [facility] [phone] Your Updated Medication List  
  
   
This list is accurate as of 3/13/18 12:25 PM.  Always use your most recent med list.  
  
  
  
  
 Adhesive Bandage 6 X 8 \" Bndg Commonly known as:  Tendra Mepilex Border 1 Each by Apply Externally route daily. One, T14.8, 2x4cm open area with bloody exudate ALPRAZolam 0.25 mg tablet Commonly known as:  XANAX  
TAKE 1 TABLET BY MOUTH THREE TIMES DAILY AS NEEDED FOR ANXIETY  
  
 ammonium lactate 12 % topical cream  
Commonly known as:  AMLACTIN  
APPLY TO THE AFFECTED AREA AND RUB IN WELL TWICE DAILY  
  
 diclofenac 1 % Gel Commonly known as:  VOLTAREN Apply  to affected area four (4) times daily. fenofibrate nanocrystallized 145 mg tablet Commonly known as:  TRICOR  
TAKE 1 TABLET BY MOUTH DAILY  
  
 fluticasone 50 mcg/actuation nasal spray Commonly known as:  Toya Laura 2 Sprays by Both Nostrils route daily. furosemide 20 mg tablet Commonly known as:  LASIX TAKE 1 TABLET BY MOUTH EVERY DAY  
  
 hydrOXYzine pamoate 25 mg capsule Commonly known as:  VISTARIL  
TAKE 1 CAPSULE BY MOUTH THREE TIMES DAILY AS NEEDED FOR ITCHING  
  
 imipramine 25 mg tablet Commonly known as:  TOFRANIL Take 3 Tabs by mouth nightly for 90 days. insulin glargine 100 unit/mL (3 mL) Inpn Commonly known as:  LANTUS,BASAGLAR  
by SubCUTAneous route two (2) times a day.  80mg am and 80mg pm.  
  
 mupirocin calcium 2 % topical cream  
 Commonly known as:  BACTROBAN  
APPLY EXTERNALLY TO THE AFFECTED AREA EVERY DAY AS DIRECTED  
  
 naftifine 1 % topical cream  
Commonly known as:  NAFTIN  
APPLY EXTERNALLY TO THE AFFECTED AREA EVERY DAY FOR 2 WEEKS OR AS DIRECTED NovoLOG Flexpen U-100 Insulin 100 unit/mL Inpn Generic drug:  insulin aspart U-100  
by SubCUTAneous route Before breakfast, lunch, dinner and at bedtime. Sliding scale  
  
 nystatin-triamcinolone topical cream  
Commonly known as:  MYCOLOG II  
APPLY EXTERNALLY TO THE AFFECTED AREA TWICE DAILY pantoprazole 40 mg tablet Commonly known as:  PROTONIX Take 1 Tab by mouth daily. risperiDONE 1 mg tablet Commonly known as:  RisperDAL Take 2 Tabs by mouth two (2) times a day. Indications: psychosis  
  
 silver sulfADIAZINE 1 % topical cream  
Commonly known as:  SILVADENE Apply  to affected area daily. Apply daily as directed Prescriptions Sent to Pharmacy Refills  
 risperiDONE (RISPERDAL) 1 mg tablet 3 Sig: Take 2 Tabs by mouth two (2) times a day. Indications: psychosis Class: Normal  
 Pharmacy: [facility] [address] [place] [address] [address] AT [facility] [address] (\[facility] [place] [facility]\"" Ph #: [phone] Route: Oral  
  
We Performed the Following REFERRAL TO PODIATRY [REF90 Custom] Referral Information Referral ID Referred By Referred To  
  
 [id number] [place], 55 [facility], [facility] Juany Hinds 33 Visits Status Start Date End Date 1 New Request 3/13/18 3/13/19 If your referral has a status of pending review or denied, additional information will be sent to support the outcome of this decision. Patient Instructions Increase risperdal to 2 pills twice daily See Dr. Lucy Balderrama for feet Please call Alex Butts to help arrange and authorize any tests and/or referrals. Her # is [phone] Introducing [facility] & HEALTH SERVICES! Joana Corycarl introduces [employer] patient portal. Now you can access parts of your medical record, email your doctor's office, and request medication refills online. 1. In your internet browser, go to [email or web address] 2. Click on the First Time User? Click Here link in the Sign In box. You will see the New Member Sign Up page. 3. Enter your [employer] Access Code exactly as it appears below. You will not need to use this code after youve completed the sign-up process. If you do not sign up before the expiration date, you must request a new code. · [employer] Access Code: [email or web address] Expires: 5/24/2018 11:13 AM 
 
4. Enter the last four digits of your Social Security Number (xxxx) and Date of Birth (mm/dd/yyyy) as indicated and click Submit. You will be taken to the next sign-up page. 5. Create a [employer] ID. This will be your [employer] login ID and cannot be changed, so think of one that is secure and easy to remember. 6. Create a [employer] password. You can change your password at any time. 7. Enter your Password Reset Question and Answer. This can be used at a later time if you forget your password. 8. Enter your e-mail address. You will receive e-mail notification when new information is available in [address] 9. Click Sign Up. You can now view and download portions of your medical record. 10. Click the Download Summary menu link to download a portable copy of your medical information. If you have questions, please visit the Frequently Asked Questions section of the [employer] website. Remember, [employer] is NOT to be used for urgent needs. For medical emergencies, dial 911. Now available from your iPhone and Android! Please provide this summary of care documentation to your next provider. Your primary care clinician is listed as Samir Bunn. If you have any questions after today's visit, please call [phone].

## 2018-03-19 ENCOUNTER — OFFICE VISIT (OUTPATIENT)
Dept: SLEEP MEDICINE | Age: 33
End: 2018-03-19

## 2018-03-19 ENCOUNTER — DOCUMENTATION ONLY (OUTPATIENT)
Dept: SLEEP MEDICINE | Age: 33
End: 2018-03-19

## 2018-03-19 VITALS
OXYGEN SATURATION: 97 % | DIASTOLIC BLOOD PRESSURE: 60 MMHG | WEIGHT: 293 LBS | HEIGHT: 55 IN | SYSTOLIC BLOOD PRESSURE: 100 MMHG | HEART RATE: 63 BPM | BODY MASS INDEX: 67.81 KG/M2

## 2018-03-19 DIAGNOSIS — G47.33 OSA (OBSTRUCTIVE SLEEP APNEA): Primary | ICD-10-CM

## 2018-03-19 DIAGNOSIS — F71 MR (MENTAL RETARDATION), MODERATE: ICD-10-CM

## 2018-03-19 DIAGNOSIS — E66.2 HYPOVENTILATION ASSOCIATED WITH OBESITY (HCC): ICD-10-CM

## 2018-03-19 DIAGNOSIS — Q87.11 PRADER-WILLI SYNDROME: ICD-10-CM

## 2018-03-19 NOTE — MR AVS SNAPSHOT
Davey Pierce 
 
 
 [address] [facility] ([place], [place]) [address] [address] [phone] Patient: Edgar Perez MRN: [id number] LLE:[id number] Visit Information Date & Time Provider Department Dept. Phone Encounter #  
 3/19/2018  2:40 PM Ayesha Kebede MD [facility] [id number] Follow-up Instructions Return in about 1 year (around 3/19/2019), or if symptoms worsen or fail to improve. Follow-up and Disposition History Upcoming Health Maintenance Date Due  
 EYE EXAM RETINAL OR DILATED Q1 8/29/2018* HEMOGLOBIN A1C Q6M 5/14/2018 MICROALBUMIN Q1 11/14/2018 LIPID PANEL Q1 11/14/2018 FOOT EXAM Q1 3/13/2019 DTaP/Tdap/Td series (2 - Td) 9/1/2026 *Topic was postponed. The date shown is not the original due date. Allergies as of 3/19/2018  Review Complete On: 3/19/2018 By: Ayesha Kebede MD  
  
 Severity Noted Reaction Type Reactions Bactrim [Sulfamethoprim Ds]  11/09/2015    Other (comments) Nausea, abdominal pain and cramping Byetta [Exenatide]  10/21/2010    Rash Current Immunizations  Reviewed on 8/29/2017 Name Date Influenza Vaccine 10/20/2015, 10/8/2013 Influenza Vaccine (Quad) PF 8/29/2017, 9/8/2016 Influenza Vaccine PF 9/14/2012 Pneumococcal Conjugate (PCV-13) 5/5/2015 Pneumococcal Vaccine (Unspecified Type) 2/2/2011 TD Vaccine 12/2/2010 Not reviewed this visit You Were Diagnosed With   
  
 Codes Comments CODY (obstructive sleep apnea)    -  Primary ICD-10-CM: G47.33 
ICD-9-CM: 327.23 Hypoventilation associated with obesity ([facility] [address]     ICD-10-CM: O49.1 ICD-9-CM: 278.03   
 BMI 70 and over, adult [facility])     ICD-10-CM: I44.22 ICD-9-CM: V85.45   
 MR (mental retardation), moderate     ICD-10-CM: F71 
ICD-9-CM: 318.0 Prader-Willi syndrome     ICD-10-CM: Q87.1 ICD-9-CM: 759.81 Vitals BP Pulse Height(growth percentile) Weight(growth percentile) SpO2 BMI  
 100/60 63 4' 7\" (1.397 m) 302 lb (137 kg) 97% 70.19 kg/m2 OB Status Smoking Status Unknown Never Smoker Vitals History BMI and BSA Data Body Mass Index Body Surface Area  
 70.19 kg/m 2 2.31 m 2 Preferred Pharmacy Pharmacy Name Phone [facility] [address]  [facility] [phone] Your Updated Medication List  
  
   
This list is accurate as of 3/19/18  3:27 PM.  Always use your most recent med list.  
  
  
  
  
 Adhesive Bandage 6 X 8 \" Bndg Commonly known as:  Tendra Mepilex Border 1 Each by Apply Externally route daily. One, T14.8, 2x4cm open area with bloody exudate ALPRAZolam 0.25 mg tablet Commonly known as:  XANAX  
TAKE 1 TABLET BY MOUTH THREE TIMES DAILY AS NEEDED FOR ANXIETY  
  
 ammonium lactate 12 % topical cream  
Commonly known as:  AMLACTIN  
APPLY TO THE AFFECTED AREA AND RUB IN WELL TWICE DAILY  
  
 diclofenac 1 % Gel Commonly known as:  VOLTAREN Apply  to affected area four (4) times daily. fenofibrate nanocrystallized 145 mg tablet Commonly known as:  TRICOR  
TAKE 1 TABLET BY MOUTH DAILY  
  
 fluticasone 50 mcg/actuation nasal spray Commonly known as:  Marleny Medin 2 Sprays by Both Nostrils route daily. furosemide 20 mg tablet Commonly known as:  LASIX TAKE 1 TABLET BY MOUTH EVERY DAY  
  
 hydrOXYzine pamoate 25 mg capsule Commonly known as:  VISTARIL  
TAKE 1 CAPSULE BY MOUTH THREE TIMES DAILY AS NEEDED FOR ITCHING  
  
 imipramine 25 mg tablet Commonly known as:  TOFRANIL Take 3 Tabs by mouth nightly for 90 days. insulin glargine 100 unit/mL (3 mL) Inpn Commonly known as:  LANRICKEYBASAGLAR  
by SubCUTAneous route two (2) times a day.  80mg am and 80mg pm.  
  
 mupirocin calcium 2 % topical cream  
Commonly known as:  Appscio  
 APPLY EXTERNALLY TO THE AFFECTED AREA EVERY DAY AS DIRECTED  
  
 naftifine 1 % topical cream  
Commonly known as:  NAFTIN  
APPLY EXTERNALLY TO THE AFFECTED AREA EVERY DAY FOR 2 WEEKS OR AS DIRECTED NovoLOG Flexpen U-100 Insulin 100 unit/mL Inpn Generic drug:  insulin aspart U-100  
by SubCUTAneous route Before breakfast, lunch, dinner and at bedtime. Sliding scale  
  
 nystatin-triamcinolone topical cream  
Commonly known as:  MYCOLOG II  
APPLY EXTERNALLY TO THE AFFECTED AREA TWICE DAILY pantoprazole 40 mg tablet Commonly known as:  PROTONIX Take 1 Tab by mouth daily. risperiDONE 1 mg tablet Commonly known as:  RisperDAL Take 2 Tabs by mouth two (2) times a day. Indications: psychosis  
  
 silver sulfADIAZINE 1 % topical cream  
Commonly known as:  SILVADENE Apply  to affected area daily. Apply daily as directed We Performed the Following AMB SUPPLY ORDER [[id number] Custom] Comments:  
 Diagnosis: (G47.33) CODY (obstructive sleep apnea)  (primary encounter diagnosis) Replacement Supplies for Positive Airway Pressure Therapy Device: Duration of need: 99 months.  Oral/Nasal Combo Mask 1 every 3 months.  Oral Cushion Combo Mask (Replace) 2 per month.  Nasal Pillows Combo Mask (Replace) 2 per month.  Full Face Mask 1 every 3 months.  Full Face Mask Cushion 1 per month.  Nasal Cushion (Replace) 2 per month.  Nasal Pillows (Replace) 2 per month.  Nasal Interface Mask 1 every 3 months.  Headgear 1 every 6 months.  Chinstrap 1 every 6 months.  Tubing 1 every 3 months.  Filter(s) Disposable 2 per month.  Filter(s) Non-Disposable 1 every 6 months.  Oral Interface 1 every 3 months. [address] for [employer] (Replace) 1 every 6 months.  Tubing with heating element 1 every 3 months. Perform Mask Fitting per patient preference and comfort - replace as above. Nhung Gomez MD, [facility]; NPI: [id number] Electronically signed. Date:- 03/19/18 AMB SUPPLY ORDER [[id number] Custom] Comments:  
 Diagnosis: Sleep Apnea ICD-10 Code (G47.30); ICD-9 Code (780.57). Nocturnal Oximetry on PAP without supplemental oxygen. Nhung Gomez MD, [facility]; NPI: [id number] Electronically signed. 03/19/18 Follow-up Instructions Return in about 1 year (around 3/19/2019), or if symptoms worsen or fail to improve. Introducing [facility]! Florencionancy Sofie introduces [employer] patient portal. Now you can access parts of your medical record, email your doctor's office, and request medication refills online. 1. In your internet browser, go to [email or web address] 2. Click on the First Time User? Click Here link in the Sign In box. You will see the New Member Sign Up page. 3. Enter your [employer] Access Code exactly as it appears below. You will not need to use this code after youve completed the sign-up process. If you do not sign up before the expiration date, you must request a new code. · [employer] Access Code: [email or web address] Expires: 5/24/2018 11:13 AM 
 
4. Enter the last four digits of your Social Security Number (xxxx) and Date of Birth (mm/dd/yyyy) as indicated and click Submit. You will be taken to the next sign-up page. 5. Create a [employer] ID. This will be your [employer] login ID and cannot be changed, so think of one that is secure and easy to remember. 6. Create a [employer] password. You can change your password at any time. 7. Enter your Password Reset Question and Answer. This can be used at a later time if you forget your password. 8. Enter your e-mail address. You will receive e-mail notification when new information is available in [address] 9. Click Sign Up. You can now view and download portions of your medical record. 10. Click the Download Summary menu link to download a portable copy of your medical information. If you have questions, please visit the Frequently Asked Questions section of the [email or web address] website. Remember, [employer] is NOT to be used for urgent needs. For medical emergencies, dial 911. Now available from your iPhone and Android! Please provide this summary of care documentation to your next provider. Your primary care clinician is listed as Bijan Davidson. If you have any questions after today's visit, please call [phone].

## 2018-03-19 NOTE — PROGRESS NOTES
[address] [facility]. [place], [address]  Tel.  [phone]  Fax. [address] [facility] [address]  [place], [address] [facility]  Tel.  [phone]  Fax. [phone] [address] [place]any [place]   Tel.  [phone]  Fax. [phone]     S>Roshni Luong is a 35 y.o. female seen for a positive airway pressure follow-up. She reports no problems using the device. She is 100% compliant over the past 30 days. The following problems are identified:    Drowsiness yes Problems exhaling no   Snoring no Forget to put on no   Mask Comfortable yes Can't fall asleep no   Dry Mouth no Mask falls off no   Air Leaking no Frequent awakenings no         She admits that her sleep has improved. Allergies   Allergen Reactions    Bactrim [Sulfamethoprim Ds] Other (comments)     Nausea, abdominal pain and cramping      Byetta [Exenatide] Rash       She has a current medication list which includes the following prescription(s): risperidone, ammonium lactate, nystatin-triamcinolone, imipramine, alprazolam, naftifine, pantoprazole, silver sulfadiazine, insulin glargine, insulin aspart u-100, furosemide, adhesive bandage, diclofenac, mupirocin calcium, hydroxyzine pamoate, fluticasone, and fenofibrate nanocrystallized. .      She  has a past medical history of Anxiety disorder; Congestive heart failure, unspecified; Diabetes ([address] Hypertension; Lymph edema; Obesity; and Prader - Willi syndrome.     [place] Sleepiness Score: 16   and Modified F.O.S.Q. Score Total / 2: 17.5      O>    Visit Vitals    /60    Pulse 63    Ht 4' 7\" (1.397 m)    Wt 302 lb (137 kg)    SpO2 97%    BMI 70.19 kg/m2         General:   Not in acute distress   Eyes:  Anicteric sclerae, no obvious strabismus   Nose:  No obvious nasal septum deviation    Oropharynx:   Class 4 oropharyngeal outlet, thick tongue base, uvula not seen due to low-lying soft palate, narrow tonsilo-pharyngeal pilars   Tonsils:   tonsils are not visualized due to low-lying soft palate   Neck:   midline trachea   Chest/Lungs:  Equal lung expansion, clear on auscultation    CVS:  Normal rate, regular rhythm; no JVD   Skin:  Warm to touch; no obvious rashes   Neuro:  No focal deficits ; no obvious tremor    Psych:  Normal affect,  normal countenance;           A>    ICD-10-CM ICD-9-CM    1. CODY (obstructive sleep apnea) G47.33 327.23 AMB SUPPLY ORDER      POLYSOMNOGRAPHY 1 NIGHT   2. Hypoventilation associated with obesity (HCC) E66.2 278.03 POLYSOMNOGRAPHY 1 NIGHT   3. BMI 70 and over, adult ([facility] [address] Z68.45 V85.45    4. MR (mental retardation), moderate F71 318.0    5. Prader-Willi syndrome Q87.1 759.81      AHI = ?. On CPAP :  8 cmH2O. Compliant:      yes    Therapeutic Response:  Negative    P>    * Diagnosis of Obesity Hypoventilation was reviewed and a diagnostic test recommended to assess for hypoventilation during sleep. (declined by patient's parent). * Home nocturnal oximetry ordered to assess adequacy of oxygenation during sleep. * We have recommended a dedicated weight loss through appropriate diet and an exercise regiment as significant weight reduction has been shown to reduce severity of obstructive sleep apnea. * Follow-up Disposition: Not on File    * She was asked to contact our office for any problems regarding PAP therapy. * Counseling was provided regarding the importance of regular PAP use and on proper sleep hygiene and safe driving. * Re-enforced proper and regular cleaning for the device. Thank you for allowing us to participate in your patient's medical care. Kolton Lemons MD, FAASM  Electronically signed.  03/19/18

## 2018-03-29 ENCOUNTER — OFFICE VISIT (OUTPATIENT)
Dept: FAMILY MEDICINE CLINIC | Age: 33
End: 2018-03-29

## 2018-03-29 VITALS
WEIGHT: 293 LBS | TEMPERATURE: 96 F | BODY MASS INDEX: 67.81 KG/M2 | RESPIRATION RATE: 16 BRPM | HEIGHT: 55 IN | HEART RATE: 96 BPM | OXYGEN SATURATION: 99 %

## 2018-03-29 DIAGNOSIS — R05.9 COUGH: Primary | ICD-10-CM

## 2018-03-29 DIAGNOSIS — J18.9 COMMUNITY ACQUIRED PNEUMONIA, UNSPECIFIED LATERALITY: ICD-10-CM

## 2018-03-29 DIAGNOSIS — R06.2 WHEEZING: ICD-10-CM

## 2018-03-29 RX ORDER — AZITHROMYCIN 250 MG/1
TABLET, FILM COATED ORAL
Qty: 6 TAB | Refills: 0 | Status: SHIPPED | OUTPATIENT
Start: 2018-03-29 | End: 2018-04-03

## 2018-03-29 RX ORDER — ALBUTEROL SULFATE 90 UG/1
1 AEROSOL, METERED RESPIRATORY (INHALATION)
Qty: 1 INHALER | Refills: 0 | Status: SHIPPED | OUTPATIENT
Start: 2018-03-29 | End: 2022-06-10 | Stop reason: SDUPTHER

## 2018-03-29 NOTE — PATIENT INSTRUCTIONS

## 2018-03-29 NOTE — PROGRESS NOTES
[address] [facility] with [address] [facility]     Chief Complaint: Nicole Paxton and wheezing. \"    Subjective  Carlo Rich is an 35 y.o. female with a history of mental retardation, prader willi syndrome, CHF, and CODY who presents for a 3 days history of cough and wheezing. History is limited 2/2 patient's mental status. Mother is present today--provides most of the history. There is no fever. General malaise present. Patient does not feel short of breath currently. Not complaining of any pain. Mom was sick 4 days ago with similar symptoms--on abx from another provider in our practice. She reports that she is feeling much better. Allergies - reviewed: Allergies   Allergen Reactions    Bactrim [Sulfamethoprim Ds] Other (comments)     Nausea, abdominal pain and cramping      Byetta [Exenatide] Rash       Medications - reviewed:   Current Outpatient Prescriptions   Medication Sig    azithromycin (ZITHROMAX) 250 mg tablet Take 2 tablets today, then take 1 tablet daily    albuterol (PROVENTIL HFA, VENTOLIN HFA, PROAIR HFA) 90 mcg/actuation inhaler Take 1 Puff by inhalation every four (4) hours as needed for Wheezing. Dispense 8g inhaler.  risperiDONE (RISPERDAL) 1 mg tablet Take 2 Tabs by mouth two (2) times a day. Indications: psychosis    ammonium lactate (AMLACTIN) 12 % topical cream APPLY TO THE AFFECTED AREA AND RUB IN WELL TWICE DAILY    nystatin-triamcinolone (MYCOLOG II) topical cream APPLY EXTERNALLY TO THE AFFECTED AREA TWICE DAILY    imipramine (TOFRANIL) 25 mg tablet Take 3 Tabs by mouth nightly for 90 days.  ALPRAZolam (XANAX) 0.25 mg tablet TAKE 1 TABLET BY MOUTH THREE TIMES DAILY AS NEEDED FOR ANXIETY    naftifine (NAFTIN) 1 % topical cream APPLY EXTERNALLY TO THE AFFECTED AREA EVERY DAY FOR 2 WEEKS OR AS DIRECTED    pantoprazole (PROTONIX) 40 mg tablet Take 1 Tab by mouth daily.     silver sulfADIAZINE (SILVADENE) 1 % topical cream Apply  to affected area daily. Apply daily as directed    insulin glargine (LANTUS,BASAGLAR) 100 unit/mL (3 mL) inpn by SubCUTAneous route two (2) times a day. 80mg am and 80mg pm.    insulin aspart (NOVOLOG FLEXPEN) 100 unit/mL inpn by SubCUTAneous route Before breakfast, lunch, dinner and at bedtime. Sliding scale    furosemide (LASIX) 20 mg tablet TAKE 1 TABLET BY MOUTH EVERY DAY    Adhesive Bandage (TENDRA MEPILEX BORDER) 6 X 8 \" bndg 1 Each by Apply Externally route daily. One, T14.8, 2x4cm open area with bloody exudate    diclofenac (VOLTAREN) 1 % gel Apply  to affected area four (4) times daily.  mupirocin calcium (BACTROBAN) 2 % topical cream APPLY EXTERNALLY TO THE AFFECTED AREA EVERY DAY AS DIRECTED    hydrOXYzine (VISTARIL) 25 mg capsule TAKE 1 CAPSULE BY MOUTH THREE TIMES DAILY AS NEEDED FOR ITCHING    fluticasone (FLONASE) 50 mcg/actuation nasal spray 2 Sprays by Both Nostrils route daily.  fenofibrate nanocrystallized (TRICOR) 145 mg tablet TAKE 1 TABLET BY MOUTH DAILY     No current facility-administered medications for this visit. I have reviewed and updated the histories as listed below:    Past Medical History - reviewed:  Past Medical History:   Diagnosis Date    Anxiety disorder     Severe    Congestive heart failure, unspecified     Diabetes ([facility] [address]     Hypertension     Lymph edema     Obesity     Prader - Willi syndrome          Past Surgical History - reviewed:   Past Surgical History:   Procedure Laterality Date    HX CHOLECYSTECTOMY  age 25   [de-identified] TONSIL AND ADENOIDECTOMY  age 9   [de-identified] WISDOM TEETH EXTRACTION  10/1/13         Social History - reviewed:  Social History     Social History    Marital status: SINGLE     Spouse name: N/A    Number of children: N/A    Years of education: N/A     Occupational History    Not on file.      Social History Main Topics    Smoking status: Never Smoker    Smokeless tobacco: Never Used    Alcohol use No    Drug use: No    Sexual activity: No     Other Topics Concern    Not on file     Social History Narrative         Family History - reviewed:  Family History   Problem Relation Age of Onset    Hypertension Mother     Hypertension Father     MS Brother          Immunizations - reviewed:   Immunization History   Administered Date(s) Administered    Influenza Vaccine 10/08/2013, 10/20/2015    Influenza Vaccine (Quad) PF 09/08/2016, 08/29/2017    Influenza Vaccine PF 09/14/2012    Pneumococcal Conjugate (PCV-13) 05/05/2015    Pneumococcal Vaccine (Unspecified Type) 02/02/2011    TD Vaccine 12/02/2010     Flu: received this season. Review of Systems  Review of Systems   Constitutional: Negative for chills and fever. Respiratory: Positive for cough (not coughing currently) and wheezing. Negative for shortness of breath. Cardiovascular: Negative for chest pain. Gastrointestinal: Negative for abdominal pain, constipation, diarrhea, nausea and vomiting. Physical Exam    Visit Vitals    Pulse 96    Temp 96 °F (35.6 °C) (Oral)    Resp 16    Ht 4' 7\" (1.397 m)    Wt 304 lb (137.9 kg)    SpO2 99%    BMI 70.66 kg/m2       Physical Exam   Constitutional: She is oriented to person, place, and time. She appears well-developed and well-nourished. No distress. Patient is morbibidly obese. HENT:   Head: Normocephalic. Right Ear: External ear normal.   Nose: Nose normal.   Mouth/Throat: Oropharynx is clear and moist. No oropharyngeal exudate. Eyes: Pupils are equal, round, and reactive to light. Right eye exhibits no discharge. Left eye exhibits no discharge. Neck: Normal range of motion. No thyromegaly present. Cardiovascular: Normal rate, regular rhythm, normal heart sounds and intact distal pulses. Exam reveals no gallop and no friction rub. No murmur heard. Pulmonary/Chest: Effort normal. No respiratory distress. She has wheezes. She has no rales. She exhibits no tenderness.    Wheezes and rhonchi heard in all lung fields. Musculoskeletal: Normal range of motion. Lymphadenopathy:     She has no cervical adenopathy. Neurological: She is alert and oriented to person, place, and time. No cranial nerve deficit. Skin: Skin is warm and dry. She is not diaphoretic. Psychiatric: She has a normal mood and affect. Nursing note and vitals reviewed. I have personally reviewed chest XR in addition to radiologist's report. Exam limited 2/2 body habitus. Possible opacity in lower lung fields. Radiologist states that lungs are grossly clear. XR Results (most recent):    Results from Appointment encounter on 03/29/18   XR CHEST PA LAT   Narrative Indication:  cough, wheezing, tachycardia     Exam: PA and lateral views of the chest.    Direct comparison is made to prior CXR dated May 2012. Findings: Examination is somewhat limited due to body habitus. Cardiomediastinal  silhouette is stable. Lungs are grossly clear bilaterally. No pleural fluid is  visualized. There is no pneumothorax. Impression IMPRESSION: Grossly clear lungs. Assessment    ICD-10-CM ICD-9-CM    1. Cough R05 786.2 XR CHEST PA LAT   2. Wheezing R06.2 786.07 albuterol (PROVENTIL HFA, VENTOLIN HFA, PROAIR HFA) 90 mcg/actuation inhaler   3. Community acquired pneumonia, unspecified laterality J18.9 486 azithromycin (ZITHROMAX) 250 mg tablet     Plan  1. Cough - XR obtained today for patients symptoms, wheezing/rhonchi, and tachycardia. XR read as above. Grossly clear. - XR CHEST PA LAT; Future    2. Wheezing - no hx of RAD. Will give a small (8g) albuterol inhaler today. Aware that this will need to be paid for out of pocket. - albuterol (PROVENTIL HFA, VENTOLIN HFA, PROAIR HFA) 90 mcg/actuation inhaler; Take 1 Puff by inhalation every four (4) hours as needed for Wheezing. Dispense 8g inhaler. Dispense: 1 Inhaler; Refill: 0    3.  Community acquired pneumonia, unspecified laterality - will go ahead and tread with zithromax today. Patient is high risk 2/2 comorbibities (DM, CHF). Patient and mother in agreement. Will follow up with me if not better in 3-4 days. - azithromycin (ZITHROMAX) 250 mg tablet; Take 2 tablets today, then take 1 tablet daily  Dispense: 6 Tab; Refill: 0    Follow-up Disposition:  Return if symptoms worsen or fail to improve. I have discussed the diagnosis with the patient and the intended plan as seen in the above orders. Patient verbalized understanding of the plan and agrees with the plan. The patient has received an after-visit summary and questions were answered concerning future plans. I have discussed medication side effects and warnings with the patient as well. Informed patient to return to the office if new symptoms arise. Patient was seen and discussed with Dr. Lamont Eugene.     Gordon Mckeon MD  Family Medicine Resident

## 2018-03-30 ENCOUNTER — TELEPHONE (OUTPATIENT)
Dept: FAMILY MEDICINE CLINIC | Age: 33
End: 2018-03-30

## 2018-03-30 NOTE — TELEPHONE ENCOUNTER
Patient mother Elvie Berg) would like to be contacted with xray/chest results.     Please call [phone]    thanks

## 2018-03-30 NOTE — TELEPHONE ENCOUNTER
Called and spoke to patient's mother. Brianda York was having some cough this morning, but feeling better now. Had a temperature overnight. Afebrile now. Recommended continuing zithromax for now. Can use OTC expectorant for cough. I instructed Aleksandra that if Brianda York gets worse (difficulty breathing), that they should go to the [facility] ER for further evaluation. She was in agreement with this plan.

## 2018-03-30 NOTE — TELEPHONE ENCOUNTER
XR showed grossly normal lungs (radiologist impression). Limited by Roshni's size. No obvious pneumonia, but we are treating for PNA anyway. I am happy to speak with Aleida Dhaliwal if need be.

## 2018-03-30 NOTE — TELEPHONE ENCOUNTER
Spoke with Chadwick Luong and gave her the results of the Xray per Dr See Adjutant. She stated Sharyle Reeds has been running a fever and is worse, and can they get something for her cough?     Her fever is 101 today

## 2018-04-04 RX ORDER — NYSTATIN AND TRIAMCINOLONE ACETONIDE 100000; 1 [USP'U]/G; MG/G
CREAM TOPICAL
Qty: 240 G | Refills: 0 | Status: SHIPPED | OUTPATIENT
Start: 2018-04-04 | End: 2018-05-07 | Stop reason: SDUPTHER

## 2018-04-04 RX ORDER — AMMONIUM LACTATE 12 G/100G
CREAM TOPICAL
Qty: 280 G | Refills: 0 | Status: SHIPPED | OUTPATIENT
Start: 2018-04-04 | End: 2018-05-07 | Stop reason: SDUPTHER

## 2018-05-07 DIAGNOSIS — F41.9 ANXIETY: ICD-10-CM

## 2018-05-07 DIAGNOSIS — K21.9 GASTROESOPHAGEAL REFLUX DISEASE WITHOUT ESOPHAGITIS: ICD-10-CM

## 2018-05-07 RX ORDER — ALPRAZOLAM 0.25 MG/1
TABLET ORAL
Qty: 90 TAB | Refills: 0 | Status: SHIPPED | OUTPATIENT
Start: 2018-05-07 | End: 2018-05-08 | Stop reason: SDUPTHER

## 2018-05-07 RX ORDER — NAFTIFINE HYDROCHLORIDE 1 MG/G
CREAM TOPICAL
Qty: 60 G | Refills: 0 | Status: SHIPPED | OUTPATIENT
Start: 2018-05-07

## 2018-05-07 RX ORDER — NYSTATIN AND TRIAMCINOLONE ACETONIDE 100000; 1 [USP'U]/G; MG/G
CREAM TOPICAL
Qty: 240 G | Refills: 0 | Status: SHIPPED | OUTPATIENT
Start: 2018-05-07 | End: 2018-06-15 | Stop reason: SDUPTHER

## 2018-05-07 RX ORDER — PANTOPRAZOLE SODIUM 40 MG/1
40 TABLET, DELAYED RELEASE ORAL DAILY
Qty: 30 TAB | Refills: 11 | Status: SHIPPED | OUTPATIENT
Start: 2018-05-07 | End: 2018-10-01 | Stop reason: SDUPTHER

## 2018-05-07 RX ORDER — AMMONIUM LACTATE 12 G/100G
CREAM TOPICAL
Qty: 280 G | Refills: 0 | Status: SHIPPED | OUTPATIENT
Start: 2018-05-07 | End: 2018-06-22 | Stop reason: SDUPTHER

## 2018-05-08 ENCOUNTER — OFFICE VISIT (OUTPATIENT)
Dept: FAMILY MEDICINE CLINIC | Age: 33
End: 2018-05-08

## 2018-05-08 VITALS
HEIGHT: 55 IN | TEMPERATURE: 98 F | BODY MASS INDEX: 67.81 KG/M2 | OXYGEN SATURATION: 97 % | WEIGHT: 293 LBS | RESPIRATION RATE: 18 BRPM

## 2018-05-08 DIAGNOSIS — F41.9 ANXIETY: ICD-10-CM

## 2018-05-08 DIAGNOSIS — Q87.11 PRADER-WILLI SYNDROME: ICD-10-CM

## 2018-05-08 DIAGNOSIS — F41.1 GENERALIZED ANXIETY DISORDER: ICD-10-CM

## 2018-05-08 DIAGNOSIS — E66.01 OBESITY, MORBID (HCC): ICD-10-CM

## 2018-05-08 DIAGNOSIS — L08.9 SKIN INFECTION: Primary | ICD-10-CM

## 2018-05-08 DIAGNOSIS — E11.65 TYPE 2 DIABETES MELLITUS WITH HYPERGLYCEMIA, UNSPECIFIED WHETHER LONG TERM INSULIN USE (HCC): ICD-10-CM

## 2018-05-08 DIAGNOSIS — I50.9 CONGESTIVE HEART FAILURE, UNSPECIFIED HF CHRONICITY, UNSPECIFIED HEART FAILURE TYPE (HCC): ICD-10-CM

## 2018-05-08 DIAGNOSIS — Z91.81 RISK FOR FALLS: ICD-10-CM

## 2018-05-08 RX ORDER — ALPRAZOLAM 0.25 MG/1
TABLET ORAL
Qty: 90 TAB | Refills: 0 | Status: SHIPPED | OUTPATIENT
Start: 2018-05-08 | End: 2018-09-04 | Stop reason: SDUPTHER

## 2018-05-08 RX ORDER — MUPIROCIN CALCIUM 20 MG/G
CREAM TOPICAL
Qty: 30 G | Refills: 1 | Status: SHIPPED | OUTPATIENT
Start: 2018-05-08 | End: 2018-10-21 | Stop reason: SDUPTHER

## 2018-05-08 RX ORDER — FUROSEMIDE 40 MG/1
40 TABLET ORAL DAILY
COMMUNITY

## 2018-05-08 RX ORDER — IMIPRAMINE HYDROCHLORIDE 25 MG/1
75 TABLET ORAL
Qty: 270 TAB | Refills: 3 | Status: SHIPPED | OUTPATIENT
Start: 2018-05-08 | End: 2018-08-06

## 2018-05-08 NOTE — ASSESSMENT & PLAN NOTE
This condition is managed by Specialist.  Key Antihyperglycemic Medications             insulin glargine (LANTUS,BASAGLAR) 100 unit/mL (3 mL) inpn  (Taking) by SubCUTAneous route two (2) times a day. 80mg am and 80mg pm.    insulin aspart (NOVOLOG FLEXPEN) 100 unit/mL inpn  (Taking) by SubCUTAneous route Before breakfast, lunch, dinner and at bedtime.  Sliding scale        Other Key Diabetic Medications             fenofibrate nanocrystallized (TRICOR) 145 mg tablet  (Taking) TAKE 1 TABLET BY MOUTH DAILY        Lab Results   Component Value Date/Time    Hemoglobin A1c 8.6 06/13/2017 11:54 AM    Glucose CANCELED 06/13/2017 11:54 AM    Creatinine CANCELED 06/13/2017 11:54 AM    Microalbumin/creat ratio (POC) <30 06/13/2017 01:30 PM    Cholesterol, total CANCELED 06/13/2017 11:54 AM    HDL Cholesterol CANCELED 06/13/2017 11:54 AM    LDL,Direct 107 06/13/2017 11:54 AM     Diabetic Foot and Eye Exam HM Status   Topic Date Due    Eye Exam  08/29/2018 (Originally 4/21/2017)   Maged Hogan Diabetic Foot Care  03/13/2019

## 2018-05-08 NOTE — PROGRESS NOTES
1. Have you been to the ER, urgent care clinic since your last visit? Hospitalized since your last visit? Yes, [address], for Wheezing 04/2018     2. Have you seen or consulted any other health care providers outside of the [address] [place] [address] since your last visit? Include any pap smears or colon screening. No    Chief Complaint   Patient presents with    Medication Refill     Imipramine/Alprazolam       Patient has \"pimple\" on pubic/groin area, mother would like to have checked out. Temperature 98 °F (36.7 °C), temperature source Axillary, resp. rate 18, height 4' 7\" (1.397 m), weight 309 lb (140.2 kg), SpO2 97 %.

## 2018-05-08 NOTE — ASSESSMENT & PLAN NOTE
Uncontrolled, based on history, physical exam and review of pertinent labs, studies and medications; meds reconciled; continue current treatment plan, lifestyle modifications recommended. Key Obesity Meds             furosemide (LASIX) 40 mg tablet  (Taking) Take 40 mg by mouth daily.     furosemide (LASIX) 20 mg tablet  (Taking) TAKE 1 TABLET BY MOUTH EVERY DAY        Lab Results   Component Value Date/Time    Hemoglobin A1c 8.6 06/13/2017 11:54 AM    Glucose CANCELED 06/13/2017 11:54 AM    Cholesterol, total CANCELED 06/13/2017 11:54 AM    HDL Cholesterol CANCELED 06/13/2017 11:54 AM    LDL,Direct 107 06/13/2017 11:54 AM    Sodium CANCELED 06/13/2017 11:54 AM    Potassium CANCELED 06/13/2017 11:54 AM    ALT (SGPT) CANCELED 06/13/2017 11:54 AM    VITAMIN D, 25-HYDROXY CANCELED 06/13/2017 11:54 AM

## 2018-05-08 NOTE — PROGRESS NOTES
Greater than 50% of today's 15 minute visit was counseling or coordination of care for the following reasons:    See diagnoses and orders, see patient instructions    They switched pharmacy and needs new refills    She is trying to get a lift chair for bathing. They need PT evaluation. Will arrange. Signs and symptoms:  Skin irritation   Duration:  days  Context:  Large panniculus  Location:  abd  Quality:  No pustules  Severity:  She has h/o MRSA  Timing:  now  Modifying factors:  Refill bactroban    Diagnoses and all orders for this visit:    1. Skin infection  -     mupirocin calcium (BACTROBAN) 2 % topical cream; APPLY EXTERNALLY TO THE AFFECTED AREA EVERY DAY AS DIRECTED    2. Risk for falls  -     REFERRAL TO PHYSICAL THERAPY    3. Prader-Willi syndrome    4. Generalized anxiety disorder  -     imipramine (TOFRANIL) 25 mg tablet; Take 3 Tabs by mouth nightly for 90 days. 5. Anxiety  -     ALPRAZolam (XANAX) 0.25 mg tablet; TAKE 1 TABLET BY MOUTH THREE TIMES DAILY AS NEEDED FOR ANXIETY    6. Congestive heart failure, unspecified HF chronicity, unspecified heart failure type [facility])  Assessment & Plan: This condition is managed by Specialist.  Key CAD CHF Meds             furosemide (LASIX) 40 mg tablet  (Taking) Take 40 mg by mouth daily. furosemide (LASIX) 20 mg tablet  (Taking) TAKE 1 TABLET BY MOUTH EVERY DAY    fenofibrate nanocrystallized (TRICOR) 145 mg tablet  (Taking) TAKE 1 TABLET BY MOUTH DAILY        [id number](BNP,BNPP,BNPPPOC,HSCRP,NA,NAPOC,K,KPOCT,CHOL,CHOLPOCT,HDL,HDLPOC,LDLCHOL,LDLPOCT,LDLCPOC,LDLC,LDL,LDLCEXT,TRIGL,TGLPOCT,INR,INREXT,INRPOC,PTP,PTINR,PTEXT,DIG)@      7. Type 2 diabetes mellitus with hyperglycemia, unspecified whether long term insulin use ([facility]  Assessment & Plan: This condition is managed by Specialist.  Key Antihyperglycemic Medications             insulin glargine (LANTUS,BASAGLAR) 100 unit/mL (3 mL) inpn  (Taking) by SubCUTAneous route two (2) times a day.  80mg am and 80mg pm.    insulin aspart (NOVOLOG FLEXPEN) 100 unit/mL inpn  (Taking) by SubCUTAneous route Before breakfast, lunch, dinner and at bedtime. Sliding scale        Other Key Diabetic Medications             fenofibrate nanocrystallized (TRICOR) 145 mg tablet  (Taking) TAKE 1 TABLET BY MOUTH DAILY        Lab Results   Component Value Date/Time    Hemoglobin A1c 8.6 06/13/2017 11:54 AM    Glucose CANCELED 06/13/2017 11:54 AM    Creatinine CANCELED 06/13/2017 11:54 AM    Microalbumin/creat ratio (POC) <30 06/13/2017 01:30 PM    Cholesterol, total CANCELED 06/13/2017 11:54 AM    HDL Cholesterol CANCELED 06/13/2017 11:54 AM    LDL,Direct 107 06/13/2017 11:54 AM     Diabetic Foot and Eye Exam HM Status   Topic Date Due    Eye Exam  08/29/2018 (Originally 4/21/2017)    Diabetic Foot Care  03/13/2019         8. Obesity, morbid ([facility] [facility]  Assessment & Plan:  Uncontrolled, based on history, physical exam and review of pertinent labs, studies and medications; meds reconciled; continue current treatment plan, lifestyle modifications recommended. Key Obesity Meds             furosemide (LASIX) 40 mg tablet  (Taking) Take 40 mg by mouth daily.     furosemide (LASIX) 20 mg tablet  (Taking) TAKE 1 TABLET BY MOUTH EVERY DAY        Lab Results   Component Value Date/Time    Hemoglobin A1c 8.6 06/13/2017 11:54 AM    Glucose CANCELED 06/13/2017 11:54 AM    Cholesterol, total CANCELED 06/13/2017 11:54 AM    HDL Cholesterol CANCELED 06/13/2017 11:54 AM    LDL,Direct 107 06/13/2017 11:54 AM    Sodium CANCELED 06/13/2017 11:54 AM    Potassium CANCELED 06/13/2017 11:54 AM    ALT (SGPT) CANCELED 06/13/2017 11:54 AM    VITAMIN D, 25-HYDROXY CANCELED 06/13/2017 11:54 AM

## 2018-05-08 NOTE — ASSESSMENT & PLAN NOTE
This condition is managed by Specialist.  Key CAD CHF Meds             furosemide (LASIX) 40 mg tablet  (Taking) Take 40 mg by mouth daily.     furosemide (LASIX) 20 mg tablet  (Taking) TAKE 1 TABLET BY MOUTH EVERY DAY    fenofibrate nanocrystallized (TRICOR) 145 mg tablet  (Taking) TAKE 1 TABLET BY MOUTH DAILY        [id number](BNP,BNPP,BNPPPOC,HSCRP,NA,NAPOC,K,KPOCT,CHOL,CHOLPOCT,HDL,HDLPOC,LDLCHOL,LDLPOCT,LDLCPOC,LDLC,LDL,LDLCEXT,TRIGL,TGLPOCT,INR,INREXT,INRPOC,PTP,PTINR,PTEXT,DIG)@

## 2018-05-09 ENCOUNTER — OFFICE VISIT (OUTPATIENT)
Dept: FAMILY MEDICINE CLINIC | Age: 33
End: 2018-05-09

## 2018-05-09 ENCOUNTER — TELEPHONE (OUTPATIENT)
Dept: FAMILY MEDICINE CLINIC | Age: 33
End: 2018-05-09

## 2018-05-09 VITALS
BODY MASS INDEX: 67.81 KG/M2 | HEART RATE: 112 BPM | HEIGHT: 55 IN | OXYGEN SATURATION: 96 % | WEIGHT: 293 LBS | TEMPERATURE: 99 F

## 2018-05-09 DIAGNOSIS — L03.90 CELLULITIS, UNSPECIFIED CELLULITIS SITE: Primary | ICD-10-CM

## 2018-05-09 RX ORDER — DOXYCYCLINE 100 MG/1
100 TABLET ORAL 2 TIMES DAILY
Qty: 20 TAB | Refills: 0 | Status: SHIPPED | OUTPATIENT
Start: 2018-05-09 | End: 2018-05-19

## 2018-05-09 NOTE — MR AVS SNAPSHOT
[address] 
[phone] Patient: Tadeo Plata MRN: [id number] MOP:1[birth year]48[birth year]323 Visit Information Date & Time Provider Department Dept. Phone Encounter #  
 5/9/2018  5:30 PM Fabio Bailey MD [facility] [facility] [phone] [id number] Follow-up Instructions Return if symptoms worsen or fail to improve. Your Appointments 6/14/2018  1:40 PM  
Medicare Physical with Rohit Garcia MD  
[address] [facility] MED [facility]) Appt Note: [facility]   
 [address] [facility]  
[phone]  
  
   
 [address] [facility] [phone] Upcoming Health Maintenance Date Due HEMOGLOBIN A1C Q6M 5/14/2018 EYE EXAM RETINAL OR DILATED Q1 8/29/2018* MEDICARE YEARLY EXAM 6/14/2018 Influenza Age 5 to Adult 8/1/2018 MICROALBUMIN Q1 11/14/2018 LIPID PANEL Q1 11/14/2018 FOOT EXAM Q1 3/13/2019 DTaP/Tdap/Td series (2 - Td) 9/1/2026 *Topic was postponed. The date shown is not the original due date. Allergies as of 5/9/2018  Review Complete On: 5/9/2018 By: Idania Del Castillo LPN Severity Noted Reaction Type Reactions Bactrim [Sulfamethoprim Ds]  11/09/2015    Other (comments) Nausea, abdominal pain and cramping Byetta [Exenatide]  10/21/2010    Rash Current Immunizations  Reviewed on 8/29/2017 Name Date Influenza Vaccine 10/20/2015, 10/8/2013 Influenza Vaccine (Quad) PF 8/29/2017, 9/8/2016 Influenza Vaccine PF 9/14/2012 Pneumococcal Conjugate (PCV-13) 5/5/2015 Pneumococcal Vaccine (Unspecified Type) 2/2/2011 TD Vaccine 12/2/2010 Not reviewed this visit You Were Diagnosed With   
  
 Codes Comments Cellulitis, unspecified cellulitis site    -  Primary ICD-10-CM: L03.90 ICD-9-CM: 426. 9 Vitals Pulse Temp Height(growth percentile) Weight(growth percentile) SpO2 BMI (!) 112 99 °F (37.2 °C) (Oral) 4' 7\" (1.397 m) 312 lb (141.5 kg) 96% 72.52 kg/m2 OB Status Smoking Status Unknown Never Smoker Vitals History BMI and BSA Data Body Mass Index Body Surface Area  
 72.52 kg/m 2 2.34 m 2 Preferred Pharmacy Pharmacy Name Phone [id number] AT [facility] [phone] Your Updated Medication List  
  
   
This list is accurate as of 5/9/18  6:20 PM.  Always use your most recent med list.  
  
  
  
  
 Adhesive Bandage 6 X 8 \" Bndg Commonly known as:  Tendra Mepilex Border 1 Each by Apply Externally route daily. One, T14.8, 2x4cm open area with bloody exudate  
  
 albuterol 90 mcg/actuation inhaler Commonly known as:  PROVENTIL HFA, VENTOLIN HFA, PROAIR HFA Take 1 Puff by inhalation every four (4) hours as needed for Wheezing. Dispense 8g inhaler. ALPRAZolam 0.25 mg tablet Commonly known as:  XANAX  
TAKE 1 TABLET BY MOUTH THREE TIMES DAILY AS NEEDED FOR ANXIETY  
  
 ammonium lactate 12 % topical cream  
Commonly known as:  AMLACTIN  
APPLY TO THE AFFECTED AREA AND RUB IN WELL TWICE DAILY  
  
 doxycycline 100 mg tablet Commonly known as:  ADOXA Take 1 Tab by mouth two (2) times a day for 10 days. fenofibrate nanocrystallized 145 mg tablet Commonly known as:  TRICOR  
TAKE 1 TABLET BY MOUTH DAILY  
  
 fluticasone 50 mcg/actuation nasal spray Commonly known as:  Marine Knack 2 Sprays by Both Nostrils route daily. * furosemide 40 mg tablet Commonly known as:  LASIX Take 40 mg by mouth daily. * furosemide 20 mg tablet Commonly known as:  LASIX TAKE 1 TABLET BY MOUTH EVERY DAY  
  
 hydrOXYzine pamoate 25 mg capsule Commonly known as:  VISTARIL  
TAKE 1 CAPSULE BY MOUTH THREE TIMES DAILY AS NEEDED FOR ITCHING  
  
 imipramine 25 mg tablet Commonly known as:  TOFRANIL Take 3 Tabs by mouth nightly for 90 days. insulin glargine 100 unit/mL (3 mL) Inpn Commonly known as:  LANTUS,BASAGLAR  
by SubCUTAneous route two (2) times a day. 80mg am and 80mg pm.  
  
 mupirocin calcium 2 % topical cream  
Commonly known as:  BACTROBAN  
APPLY EXTERNALLY TO THE AFFECTED AREA EVERY DAY AS DIRECTED  
  
 naftifine 1 % topical cream  
Commonly known as:  NAFTIN  
APPLY EXTERNALLY TO THE AFFECTED AREA EVERY DAY FOR 2 WEEKS OR AS DIRECTED NovoLOG Flexpen U-100 Insulin 100 unit/mL Inpn Generic drug:  insulin aspart U-100  
by SubCUTAneous route Before breakfast, lunch, dinner and at bedtime. Sliding scale  
  
 nystatin-triamcinolone topical cream  
Commonly known as:  MYCOLOG II  
APPLY EXTERNALLY TO THE AFFECTED AREA TWICE DAILY pantoprazole 40 mg tablet Commonly known as:  PROTONIX Take 1 Tab by mouth daily. risperiDONE 1 mg tablet Commonly known as:  RisperDAL Take 2 Tabs by mouth two (2) times a day. Indications: psychosis  
  
 silver sulfADIAZINE 1 % topical cream  
Commonly known as:  SILVADENE Apply  to affected area daily. Apply daily as directed * Notice: This list has 2 medication(s) that are the same as other medications prescribed for you. Read the directions carefully, and ask your doctor or other care provider to review them with you. Prescriptions Sent to Pharmacy Refills  
 doxycycline (ADOXA) 100 mg tablet 0 Sig: Take 1 Tab by mouth two (2) times a day for 10 days. Class: Normal  
 Pharmacy: [facility] [address] #: [phone] Route: Oral  
  
Follow-up Instructions Return if symptoms worsen or fail to improve. Patient Instructions Skin Abscess: Care Instructions Your Care Instructions A skin abscess is a bacterial infection that forms a pocket of pus. A boil is a kind of skin abscess.  The doctor may have cut an opening in the abscess so that the pus can drain out. You may have gauze in the cut so that the abscess will stay open and keep draining. You may need antibiotics. You will need to follow up with your doctor to make sure the infection has gone away. The doctor has checked you carefully, but problems can develop later. If you notice any problems or new symptoms, get medical treatment right away. Follow-up care is a key part of your treatment and safety. Be sure to make and go to all appointments, and call your doctor if you are having problems. It's also a good idea to know your test results and keep a list of the medicines you take. How can you care for yourself at home? · Apply warm and dry compresses, a heating pad set on low, or a hot water bottle 3 or 4 times a day for pain. Keep a cloth between the heat source and your skin. · If your doctor prescribed antibiotics, take them as directed. Do not stop taking them just because you feel better. You need to take the full course of antibiotics. · Take pain medicines exactly as directed. ¨ If the doctor gave you a prescription medicine for pain, take it as prescribed. ¨ If you are not taking a prescription pain medicine, ask your doctor if you can take an over-the-counter medicine. · Keep your bandage clean and dry. Change the bandage whenever it gets wet or dirty, or at least one time a day. · If the abscess was packed with gauze: 
¨ Keep follow-up appointments to have the gauze changed or removed. If the doctor instructed you to remove the gauze, gently pull out all of the gauze when your doctor tells you to. ¨ After the gauze is removed, soak the area in warm water for 15 to 20 minutes 2 times a day, until the wound closes. When should you call for help? Call your doctor now or seek immediate medical care if: 
? · You have signs of worsening infection, such as: 
¨ Increased pain, swelling, warmth, or redness. ¨ Red streaks leading from the infected skin. ¨ Pus draining from the wound. ¨ A fever. ? Watch closely for changes in your health, and be sure to contact your doctor if: 
? · You do not get better as expected. Where can you learn more? Go to http://darius-gricelda.info/. Enter C727 in the search box to learn more about \"Skin Abscess: Care Instructions. \" Current as of: October 13, 2016 Content Version: 11.4 © 3837-7437 [employer]. Care instructions adapted under license by [employer] (which disclaims liability or warranty for this information). If you have questions about a medical condition or this instruction, always ask your healthcare professional. [address] any warranty or liability for your use of this information. Introducing [facility]! Clare Gan introduces [employer] patient portal. Now you can access parts of your medical record, email your doctor's office, and request medication refills online. 1. In your internet browser, go to [email or web address] 2. Click on the First Time User? Click Here link in the Sign In box. You will see the New Member Sign Up page. 3. Enter your [employer] Access Code exactly as it appears below. You will not need to use this code after youve completed the sign-up process. If you do not sign up before the expiration date, you must request a new code. · [employer] Access Code: [email or web address] Expires: 5/24/2018 11:13 AM 
 
4. Enter the last four digits of your Social Security Number (xxxx) and Date of Birth (mm/dd/yyyy) as indicated and click Submit. You will be taken to the next sign-up page. 5. Create a "[employer]t ID. This will be your [employer] login ID and cannot be changed, so think of one that is secure and easy to remember. 6. Create a "[employer]t password. You can change your password at any time. 7. Enter your Password Reset Question and Answer. This can be used at a later time if you forget your password. 8. Enter your e-mail address. You will receive e-mail notification when new information is available in [address] 9. Click Sign Up. You can now view and download portions of your medical record. 10. Click the Download Summary menu link to download a portable copy of your medical information. If you have questions, please visit the Frequently Asked Questions section of the Flexiro[employer] website. Remember, Flexiroam is NOT to be used for urgent needs. For medical emergencies, dial 911. Now available from your iPhone and Android! Please provide this summary of care documentation to your next provider. Your primary care clinician is listed as Hailey Arteaga. If you have any questions after today's visit, please call [phone].

## 2018-05-09 NOTE — PATIENT INSTRUCTIONS

## 2018-05-09 NOTE — TELEPHONE ENCOUNTER
Dr. Bill Majano: Elise Chatman, with [facility], is requesting a signature for an order form. Brendon Jyotsna states that she needs an order form signed to evaluate the Pt for the equipment needed.  Morelia's best contact number is [phone] and her fax number is [phone].  Pt's best contact number is [phone].      Thanks,   Sury Rosen

## 2018-05-09 NOTE — PROGRESS NOTES
Subjective    Chief complaint: skin infection   Pt presented with mom who provided most of the hx.     Rene Hale is an 35 y.o. female with a hx of Prader Cedric Syndrome and diabetes currently on insulin here for evaluation of skin infection in her lower abdominal area. Pt was seen in clinic yesterday for a similar complaint and given topical bactroban. Mom reports she used bactroban as prescribed yesterday and today and the area actually looks worse and is more painful. Mom reports pt has been seen at the wound care center before and has had I&D's of previous infections but this does not look as bad as her previous skin infections. No fevers, chills. Allergies - reviewed: Allergies   Allergen Reactions    Bactrim [Sulfamethoprim Ds] Other (comments)     Nausea, abdominal pain and cramping      Byetta [Exenatide] Rash         Medications - reviewed:   Current Outpatient Prescriptions   Medication Sig    doxycycline (ADOXA) 100 mg tablet Take 1 Tab by mouth two (2) times a day for 10 days.  furosemide (LASIX) 40 mg tablet Take 40 mg by mouth daily.  mupirocin calcium (BACTROBAN) 2 % topical cream APPLY EXTERNALLY TO THE AFFECTED AREA EVERY DAY AS DIRECTED    ALPRAZolam (XANAX) 0.25 mg tablet TAKE 1 TABLET BY MOUTH THREE TIMES DAILY AS NEEDED FOR ANXIETY    imipramine (TOFRANIL) 25 mg tablet Take 3 Tabs by mouth nightly for 90 days.  pantoprazole (PROTONIX) 40 mg tablet Take 1 Tab by mouth daily.  ammonium lactate (AMLACTIN) 12 % topical cream APPLY TO THE AFFECTED AREA AND RUB IN WELL TWICE DAILY    nystatin-triamcinolone (MYCOLOG II) topical cream APPLY EXTERNALLY TO THE AFFECTED AREA TWICE DAILY    naftifine (NAFTIN) 1 % topical cream APPLY EXTERNALLY TO THE AFFECTED AREA EVERY DAY FOR 2 WEEKS OR AS DIRECTED    albuterol (PROVENTIL HFA, VENTOLIN HFA, PROAIR HFA) 90 mcg/actuation inhaler Take 1 Puff by inhalation every four (4) hours as needed for Wheezing. Dispense 8g inhaler.  risperiDONE (RISPERDAL) 1 mg tablet Take 2 Tabs by mouth two (2) times a day. Indications: psychosis    silver sulfADIAZINE (SILVADENE) 1 % topical cream Apply  to affected area daily. Apply daily as directed    insulin glargine (LANTUS,BASAGLAR) 100 unit/mL (3 mL) inpn by SubCUTAneous route two (2) times a day. 80mg am and 80mg pm.    insulin aspart (NOVOLOG FLEXPEN) 100 unit/mL inpn by SubCUTAneous route Before breakfast, lunch, dinner and at bedtime. Sliding scale    furosemide (LASIX) 20 mg tablet TAKE 1 TABLET BY MOUTH EVERY DAY    Adhesive Bandage (TENDRA MEPILEX BORDER) 6 X 8 \" bndg 1 Each by Apply Externally route daily. One, T14.8, 2x4cm open area with bloody exudate    hydrOXYzine (VISTARIL) 25 mg capsule TAKE 1 CAPSULE BY MOUTH THREE TIMES DAILY AS NEEDED FOR ITCHING    fenofibrate nanocrystallized (TRICOR) 145 mg tablet TAKE 1 TABLET BY MOUTH DAILY    fluticasone (FLONASE) 50 mcg/actuation nasal spray 2 Sprays by Both Nostrils route daily. No current facility-administered medications for this visit. Past Medical History - reviewed:  Past Medical History:   Diagnosis Date    Anxiety disorder     Severe    Congestive heart failure, unspecified     Diabetes ([facility] [address]     Hypertension     Lymph edema     Obesity     Prader - Willi syndrome          Immunizations - reviewed:   Immunization History   Administered Date(s) Administered    Influenza Vaccine 10/08/2013, 10/20/2015    Influenza Vaccine (Quad) PF 09/08/2016, 08/29/2017    Influenza Vaccine PF 09/14/2012    Pneumococcal Conjugate (PCV-13) 05/05/2015    Pneumococcal Vaccine (Unspecified Type) 02/02/2011    TD Vaccine 12/02/2010         ROS  Review of Systems : A complete review of systems as performed and is negative except for those mentioned in the HPI.     Physical Exam  Visit Vitals    Pulse (!) 112    Temp 99 °F (37.2 °C) (Oral)    Ht 4' 7\" (1.397 m)    Wt 312 lb (141.5 kg)    SpO2 96%    BMI 72.52 kg/m2 General appearance - Alert, NAD. Morbid obesity   Head: Atraumatic. Normocephalic. No lymphadenopathy  Eyes: EOMI. Sclera white. Benna Him Respiratory - LCTAB. No wheeze/rale/rhonchi  Heart - Normal rate, regular rhythm. No m/r/r  Abdomen - Soft, non tender. Non distended. Neurological - No focal deficits. Speech normal.   Musculoskeletal - Normal ROM, Gait normal.    Extremities - No LE edema. Distal pulses intact  Skin - Lower abdomen right side pictured below. Area slightly warm and tender to touch. No drainage or fluctuance noted. Assessment/Plan  1. Cellulitis, unspecified cellulitis site: Cellulitis in the lower abdominal area. Pt afebrile and no fluctuance noted on exam. Pt with similar skin infections and has had I&D's before by wound care. Will treat with oral antibiotic and recommended pt follow up with wound care. Precautions provided to RTC or go to ER if symptoms worsen. - doxycycline (ADOXA) 100 mg tablet; Take 1 Tab by mouth two (2) times a day for 10 days. Dispense: 20 Tab; Refill: 0  - REFERRAL TO WOUND CARE    Follow-up Disposition:  Return if symptoms worsen or fail to improve. I discussed the aforementioned diagnoses with the patient as well as the plan of care.      Pt discussed with Dr. Amanda Narvaez MD  Family Medicine Resident

## 2018-05-10 NOTE — TELEPHONE ENCOUNTER
Marge White from [facility] called to speak to [place]. Please call her back at [phone].

## 2018-05-16 ENCOUNTER — TELEPHONE (OUTPATIENT)
Dept: FAMILY MEDICINE CLINIC | Age: 33
End: 2018-05-16

## 2018-05-16 NOTE — TELEPHONE ENCOUNTER
Returned [employer] call with [facility]      Patient mother declined skilled nursing and Physical Therapy. Only wants Occupational Therapy    Drug interaction:    1. Doxycycline and Magnesium     2. Imipramine and Risperidone    3. Imipramine and Albuterol    4.  Lisinopril and Furosemide

## 2018-05-16 NOTE — TELEPHONE ENCOUNTER
Per call from Melany,    Asking that Dr. Judd Tierney nurse call her directly to discuss plan of care and medication interaction.     Call [phone]    thanks

## 2018-05-17 ENCOUNTER — TELEPHONE (OUTPATIENT)
Dept: FAMILY MEDICINE CLINIC | Age: 33
End: 2018-05-17

## 2018-05-17 NOTE — TELEPHONE ENCOUNTER
----- Message from Dolores Marquez sent at 5/17/2018  9:08 AM EDT -----  Regarding: Mohan/telephone  Pts mother Marylin Pugh is returning a call from yesterday in regard to a few insurance  forms. Mothers number is [phone].

## 2018-05-18 ENCOUNTER — HOSPITAL ENCOUNTER (OUTPATIENT)
Dept: WOUND CARE | Age: 33
Discharge: HOME | End: 2018-05-18
Payer: MEDICARE

## 2018-05-18 VITALS
SYSTOLIC BLOOD PRESSURE: 135 MMHG | TEMPERATURE: 98 F | HEART RATE: 118 BPM | RESPIRATION RATE: 16 BRPM | HEIGHT: 55 IN | BODY MASS INDEX: 67.81 KG/M2 | WEIGHT: 293 LBS | DIASTOLIC BLOOD PRESSURE: 81 MMHG

## 2018-05-18 PROBLEM — L02.91 SKIN ABSCESS: Status: ACTIVE | Noted: 2018-05-18

## 2018-05-18 PROBLEM — E66.01 OBESITIES, MORBID (HCC): Status: ACTIVE | Noted: 2018-05-18

## 2018-05-18 PROBLEM — L03.311 CELLULITIS, ABDOMINAL WALL: Status: ACTIVE | Noted: 2018-05-18

## 2018-05-18 PROCEDURE — 74011000250 HC RX REV CODE- 250: Performed by: EMERGENCY MEDICINE

## 2018-05-18 PROCEDURE — 99204 OFFICE O/P NEW MOD 45 MIN: CPT

## 2018-05-18 PROCEDURE — 11042 DBRDMT SUBQ TIS 1ST 20SQCM/<: CPT

## 2018-05-18 RX ORDER — LIDOCAINE HYDROCHLORIDE 20 MG/ML
JELLY TOPICAL ONCE
Status: COMPLETED | OUTPATIENT
Start: 2018-05-18 | End: 2018-05-18

## 2018-05-18 RX ADMIN — LIDOCAINE HYDROCHLORIDE: 20 JELLY TOPICAL at 10:15

## 2018-05-18 NOTE — PROGRESS NOTES
Chief Complaint (CC): painful red skin ulcer over pubis for 2 weeks. .  Present Illness (PI): Patient noted a tender area over the pubis that was red and tender to start, Dr. Sanchez Signs doxycycline and it may be better not but he didn't culture it. .  Pertinent Past History (PMedHx): as previously, no real change otherwise than above condition. Medications and Allergies: as per [facility]. I have reviewed and concur. Illnesses: as per '[address] [facility]' recorded data noted today. Surgeries and Injuries: as per '[address] [place] [address]' recorded data noted today. Review of Systems (ROS):                        Integumentary: Other than as noted in 'PI'; skin hair and nails normal for age, with no new rash, lumps, bumps, eruptions or bleeding. Lymph: no new prominent nodes or drainage near lymph nodes. Bones, Joints, and Muscles: Other than as noted in 'PI' no new fractures, dislocations, weakness or pain. .                        Hematopoietic: no new bleeding or bruising or anemia changes. .                        Eyes: no recent trauma or inflammation. 0. Eye glasses. 0. Intra Occular Lens Implants (IOLI)                        Ears: Hearing is unchanged and usually good. Nose: no new drainage, rhinorhea or epistaxis. Mouth, and throat: no soreness, drainage or lesions. 0. Dentures. Neck: no new masses, drainage or pain                        Respiratory; no hemoptysis, current shortness of breath or pain with breath. Cardiovascular: No chest pain, palpitation or tachycardia. .                        Gastrointestinal: no recent change in appetite, stools or food tolerance.  No jaundice, hematemesis, vomiting or diarrhea                        Genito-Urinary: urine color, frequency, sensation unchanged Neurologic: no syncope, dizzyness or unusual sensations. Psychologic and Mental Status: no change in mood, sleep or memory recently     Social History: '[facility]' data today is noted. Lives with mom who is the care giver. .  Family History: '[facility]' data today is noted. Sharon Renteria Physical Exam:      General:  alert, high BMI cooperative . Head, Eyes, Ears, Nose and Throat: normocephalic, PERRLA EOMI, TMs ok, dental intact. Neck: supple without masses or adenopathy. .  Chest: full excursion without deformity, clear, . Lungs: clear to ausc. Heart: RSR. Abdomen: soft round marked Pannus development, pannus folds over the R labia where an area red with small ulcer is noted, The actual tender area, eliciting an 'Ow' response comes with palpation to the sub Q over the mons pubis. Vascular:                         Pulses:                                            R                    L                                               Radial                        ++. ++.                                              Femoral                     +.                 +.                                              DP                             +.                 +.                                              PT                             -.                 -.  Extremities: supple, marked obesity  Of all limbs symmetrically. .  Other: Sharon Renteria Vital signs and data recorded in '[facility]' for this patient today is noted, reviewed and considered. Patient notes today: some pain with movement of the pannus. Procedure Note     Name of Procedure: sharp debridement. PreOp diagnosis: follicular abscess. Open wound/ulcer. .  Anaesthesia: Lidocaine; topical   Description: using a sharp curette I removed adherent debris from base of small abscess to effect a clean bleeding base. Sharon Renteria   Post debridement dimensions changed as noted:    Depth, add 1.0 mm;    Width, add 0.0 mm   Length, add 0.0 mm. Blood Loss: 1. CCs. Post Op Diagnosis, and condition: as above. .  Follow Up Plan: return 1 week, will use aquacel to the wound surface. Specimens: 0. Counseled regarding/Discussed: need to watch the deeper sub Q pannus area for any Panniculitis. .  Clinical considerations: as above,have discussed these risks and findings with mom. Future: this should heal, I doubt any real pannus involvement.

## 2018-05-18 NOTE — WOUND CARE
05/18/18 1029   Wound Other (comment) Medial   Date First Assessed/Time First Assessed: 05/18/18 1027   POA: Yes  Wound Type: Blister/bullae  Location: (c) Other (comment)  Orientation: Medial   DRESSING STATUS Clean   DRESSING TYPE 2 x 2   Wound Length (cm) 0.2 cm   Wound Width (cm) 0.3 cm   Wound Depth (cm) 0.2   Wound Surface area (cm^2) 0.06 cm^2   Drainage Amount  Scant   Wound Odor None   Periwound Skin Condition Intact   Cleansing and Cleansing Agents  Normal saline

## 2018-05-22 ENCOUNTER — TELEPHONE (OUTPATIENT)
Dept: WOUND CARE | Age: 33
End: 2018-05-22

## 2018-05-23 ENCOUNTER — HOSPITAL ENCOUNTER (OUTPATIENT)
Dept: WOUND CARE | Age: 33
Discharge: HOME | End: 2018-05-23
Payer: MEDICARE

## 2018-05-23 VITALS — TEMPERATURE: 98.8 F | RESPIRATION RATE: 16 BRPM

## 2018-05-23 PROBLEM — S31.109A OPEN WOUND OF ABDOMINAL WALL WITH COMPLICATION: Status: ACTIVE | Noted: 2018-05-23

## 2018-05-23 PROCEDURE — 99213 OFFICE O/P EST LOW 20 MIN: CPT

## 2018-05-23 PROCEDURE — 74011000250 HC RX REV CODE- 250: Performed by: EMERGENCY MEDICINE

## 2018-05-23 RX ORDER — LIDOCAINE 40 MG/G
CREAM TOPICAL ONCE
Status: COMPLETED | OUTPATIENT
Start: 2018-05-23 | End: 2018-05-23

## 2018-05-23 RX ADMIN — LIDOCAINE: 4 CREAM TOPICAL at 13:40

## 2018-05-23 NOTE — WOUND CARE
[address]      Your treatment has been completed at [facility] outpatient wound clinic on 5/23/2018  , per Dr. Evelyn Momin. We know patients have several options when choosing a health care provider. We would like to express our sincere appreciation for having had the chance to be yours. More importantly, we enjoy having you as part of our family. We also hope your experience with us has surpassed your expectations and that you have been pleased with our service. We appreciate the confidence you've shown in selecting us as your health care provider and we will continue or commitment to provide the highest quality of service. Again, thank you for choosing us for your health care needs. If you have any further questions or concerns, please call [phone]. It has been our pleasure serving you and we hope to continue our relationship in the future, if the need occurs.        Sincerely,    [address] [facility]

## 2018-05-23 NOTE — PROGRESS NOTES
I have noted, and reviewed today's data for this patient in [facility], and concur with same. The focused physical exam, past history, review of symptoms and medications remains unchanged today except as noted below. Patient Report: mom says she's better. Patient has no complaints. .  Lesion/Wound, focused exam on Presentation today: follicular wound in pannus is closed and non tender, subQ fat is not tender as before. .      Clinical Considerations: counseled about hygiene, watch drying the skin and hair of pubis over much, may use A&D ointment to keep hair and skin supple. .  Counseled regarding/Discussed: as above, . Follow up and future plans: return as needed, should do well with home care.

## 2018-05-23 NOTE — WOUND CARE
05/23/18 1341   Wound Other (comment) Medial   Date First Assessed/Time First Assessed: 05/18/18 1027   POA: Yes  Wound Type: Blister/bullae  Location: (c) Other (comment)  Orientation: Medial   DRESSING STATUS Dry   DRESSING TYPE 2 x 2;Silver products   Wound Length (cm) 0.1 cm   Wound Width (cm) 0.1 cm   Wound Depth (cm) 0.1   Wound Surface area (cm^2) 0.01 cm^2   Drainage Amount  None   Wound Odor None   Periwound Skin Condition Erythema, non-blanchable   Cleansing and Cleansing Agents  Normal saline

## 2018-06-05 ENCOUNTER — TELEPHONE (OUTPATIENT)
Dept: FAMILY MEDICINE CLINIC | Age: 33
End: 2018-06-05

## 2018-06-05 NOTE — TELEPHONE ENCOUNTER
This message is to inform you we have attempted to reach out to your patient several times and have not been able to make contact and the patient has not responded to our messages. It is up to you to determine if you or your nurse needs to attempt to contact the patient to discuss the importance of an appointment with the specialist.  If you speak with the patient please make them aware they need to contact the office with appointment information including the date, time, doctors first and last name and the phone number to that office as soon as an appointment is made. If you receive appointment information please add to this telephone encounter and include all of the previously discussed information and route this message back to me. Please do not place a new order! I can reopen and edit the existing order.   Podiatry Order

## 2018-06-14 ENCOUNTER — OFFICE VISIT (OUTPATIENT)
Dept: FAMILY MEDICINE CLINIC | Age: 33
End: 2018-06-14

## 2018-06-14 VITALS
WEIGHT: 293 LBS | HEART RATE: 104 BPM | TEMPERATURE: 98.5 F | HEIGHT: 55 IN | OXYGEN SATURATION: 96 % | RESPIRATION RATE: 20 BRPM | BODY MASS INDEX: 67.81 KG/M2

## 2018-06-14 DIAGNOSIS — Z78.9 ADVANCE DIRECTIVE IN CHART: ICD-10-CM

## 2018-06-14 DIAGNOSIS — E11.9 TYPE 2 DIABETES MELLITUS WITHOUT COMPLICATION, UNSPECIFIED WHETHER LONG TERM INSULIN USE (HCC): ICD-10-CM

## 2018-06-14 DIAGNOSIS — E66.01 OBESITY, MORBID (HCC): ICD-10-CM

## 2018-06-14 DIAGNOSIS — I50.9 CONGESTIVE HEART FAILURE, UNSPECIFIED HF CHRONICITY, UNSPECIFIED HEART FAILURE TYPE (HCC): ICD-10-CM

## 2018-06-14 DIAGNOSIS — Z13.31 SCREENING FOR DEPRESSION: ICD-10-CM

## 2018-06-14 DIAGNOSIS — Z00.00 HEALTH CARE MAINTENANCE: Primary | ICD-10-CM

## 2018-06-14 NOTE — ASSESSMENT & PLAN NOTE
This condition is managed by Specialist.  Key CAD CHF Meds             furosemide (LASIX) 40 mg tablet  (Taking) Take 40 mg by mouth daily.     furosemide (LASIX) 20 mg tablet  (Taking) TAKE 1 TABLET BY MOUTH EVERY DAY    fenofibrate nanocrystallized (TRICOR) 145 mg tablet  (Taking) TAKE 1 TABLET BY MOUTH DAILY        [id number](BNP,BNPP,BNPPPOC,HSCRP,NA,NAPOC,K,KPOCT,CHOL,CHOLPOCT,HDL,HDLPOC,LDLCHOL,LDLPOCT,LDLCPOC,LDLC,LDL,LDLCEXT,TRIGL,TGLPOCT,INR,INREXT,INRPOC,PTP,PTINR,PTEXT,DIG)@

## 2018-06-14 NOTE — ASSESSMENT & PLAN NOTE
This condition is managed by Specialist.  Key Antihyperglycemic Medications             insulin glargine (LANTUS,BASAGLAR) 100 unit/mL (3 mL) inpn  (Taking) by SubCUTAneous route two (2) times a day. 80mg am and 80mg pm.    insulin aspart (NOVOLOG FLEXPEN) 100 unit/mL inpn  (Taking) by SubCUTAneous route Before breakfast, lunch, dinner and at bedtime.  Sliding scale        Other Key Diabetic Medications             fenofibrate nanocrystallized (TRICOR) 145 mg tablet  (Taking) TAKE 1 TABLET BY MOUTH DAILY        Lab Results   Component Value Date/Time    Hemoglobin A1c 8.6 06/13/2017 11:54 AM    Glucose CANCELED 06/13/2017 11:54 AM    Creatinine CANCELED 06/13/2017 11:54 AM    Microalbumin/creat ratio (POC) <30 06/13/2017 01:30 PM    Cholesterol, total CANCELED 06/13/2017 11:54 AM    HDL Cholesterol CANCELED 06/13/2017 11:54 AM    LDL,Direct 107 06/13/2017 11:54 AM     Diabetic Foot and Eye Exam HM Status   Topic Date Due    Eye Exam  08/29/2018 (Originally 4/21/2017)   [facility] Diabetic Foot Care  03/13/2019

## 2018-06-14 NOTE — PATIENT INSTRUCTIONS
Stay active    Prevent falls    Follow up with your specialists    Provide us with Renetta Hernandez's contact information at [address] [place] [address]

## 2018-06-14 NOTE — ACP (ADVANCE CARE PLANNING)
Full code. I spoke with mom 6/14/2018.   If something ever happens to mom, contact [facility], Zak Simpson,  (mom will try to provide contact information)

## 2018-06-14 NOTE — ASSESSMENT & PLAN NOTE
Uncontrolled, based on history, physical exam and review of pertinent labs, studies and medications; meds reconciled; continue current treatment plan, medication compliance emphasized, PWS. Key Obesity Meds             furosemide (LASIX) 40 mg tablet  (Taking) Take 40 mg by mouth daily.     furosemide (LASIX) 20 mg tablet  (Taking) TAKE 1 TABLET BY MOUTH EVERY DAY        Lab Results   Component Value Date/Time    Hemoglobin A1c 8.6 06/13/2017 11:54 AM    Glucose CANCELED 06/13/2017 11:54 AM    Cholesterol, total CANCELED 06/13/2017 11:54 AM    HDL Cholesterol CANCELED 06/13/2017 11:54 AM    LDL,Direct 107 06/13/2017 11:54 AM    Sodium CANCELED 06/13/2017 11:54 AM    Potassium CANCELED 06/13/2017 11:54 AM    ALT (SGPT) CANCELED 06/13/2017 11:54 AM    VITAMIN D, 25-HYDROXY CANCELED 06/13/2017 11:54 AM

## 2018-06-14 NOTE — PROGRESS NOTES
1. Have you been to the ER, urgent care clinic since your last visit? Hospitalized since your last visit? No    2. Have you seen or consulted any other health care providers outside of the [address] [place] [address] since your last visit? Include any pap smears or colon screening. No      Chief Complaint   Patient presents with    Annual Wellness Visit     Pulse (!) 104, temperature 98.5 °F (36.9 °C), temperature source Axillary, resp. rate 20, height 4' 7\" (1.397 m), weight 308 lb (139.7 kg), SpO2 96 %.

## 2018-06-14 NOTE — MR AVS SNAPSHOT
[address] 
[phone] Patient: Milan Andrade MRN: [id number] JACQUIE:[birth year] Visit Information Date & Time Provider Department Dept. Phone Encounter #  
 6/14/2018  1:40 PM Reina Leonardo MD [address] [phone] [id number] Follow-up Instructions Return in about 1 year (around 6/14/2019). Upcoming Health Maintenance Date Due  
 EYE EXAM RETINAL OR DILATED Q1 6/14/2019* Influenza Age 5 to Adult 8/1/2018 HEMOGLOBIN A1C Q6M 11/1/2018 MICROALBUMIN Q1 11/14/2018 LIPID PANEL Q1 11/14/2018 FOOT EXAM Q1 3/13/2019 MEDICARE YEARLY EXAM 6/15/2019 DTaP/Tdap/Td series (2 - Td) 9/1/2026 *Topic was postponed. The date shown is not the original due date. Allergies as of 6/14/2018  Review Complete On: 6/14/2018 By: Reina Leonardo MD  
  
 Severity Noted Reaction Type Reactions Bactrim [Sulfamethoprim Ds]  11/09/2015    Other (comments) Nausea, abdominal pain and cramping Byetta [Exenatide]  10/21/2010    Rash Current Immunizations  Reviewed on 6/14/2018 Name Date Influenza Vaccine 10/20/2015, 10/8/2013 Influenza Vaccine (Quad) PF 8/29/2017, 9/8/2016 Influenza Vaccine PF 9/14/2012 Pneumococcal Conjugate (PCV-13) 5/5/2015 Pneumococcal Vaccine (Unspecified Type) 2/2/2011 TD Vaccine 12/2/2010 Reviewed by Jaquelin Vogel LPN on 0/64/7286 at  1:59 PM  
You Were Diagnosed With   
  
 Codes Comments Health care maintenance    -  Primary ICD-10-CM: Z00.00 ICD-9-CM: V70.0 Congestive heart failure, unspecified HF chronicity, unspecified heart failure type ([facility]     ICD-10-CM: I50.9 ICD-9-CM: 428.0 Type 2 diabetes mellitus without complication, unspecified whether long term insulin use (HCC)     ICD-10-CM: E11.9 ICD-9-CM: 250.00  Obesity, morbid ([facility] 75.)     ICD-10-CM: E66.01 
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  
 Advance directive in chart     ICD-10-CM: Z78.9 ICD-9-CM: V49.89 see ACP note Screening for depression     ICD-10-CM: Z13.89 ICD-9-CM: V79.0 Vitals Pulse Temp Resp Height(growth percentile) Weight(growth percentile) SpO2  
 (!) 104 98.5 °F (36.9 °C) (Axillary) 20 4' 7\" (1.397 m) 308 lb (139.7 kg) 96% BMI OB Status Smoking Status 71.59 kg/m2 Unknown Never Smoker BMI and BSA Data Body Mass Index Body Surface Area  
 71.59 kg/m 2 2.33 m 2 Preferred Pharmacy Pharmacy Name Phone [id number], [address] AT [facility] [phone] Your Updated Medication List  
  
   
This list is accurate as of 6/14/18  2:52 PM.  Always use your most recent med list.  
  
  
  
  
 Adhesive Bandage 6 X 8 \" Bndg Commonly known as:  Tendra Mepilex Border 1 Each by Apply Externally route daily. One, T14.8, 2x4cm open area with bloody exudate  
  
 albuterol 90 mcg/actuation inhaler Commonly known as:  PROVENTIL HFA, VENTOLIN HFA, PROAIR HFA Take 1 Puff by inhalation every four (4) hours as needed for Wheezing. Dispense 8g inhaler. ALPRAZolam 0.25 mg tablet Commonly known as:  XANAX  
TAKE 1 TABLET BY MOUTH THREE TIMES DAILY AS NEEDED FOR ANXIETY  
  
 ammonium lactate 12 % topical cream  
Commonly known as:  AMLACTIN  
APPLY TO THE AFFECTED AREA AND RUB IN WELL TWICE DAILY  
  
 fenofibrate nanocrystallized 145 mg tablet Commonly known as:  TRICOR  
TAKE 1 TABLET BY MOUTH DAILY  
  
 fluticasone 50 mcg/actuation nasal spray Commonly known as:  [place] Gall[place] 2 Sprays by Both Nostrils route daily. * furosemide 40 mg tablet Commonly known as:  LASIX Take 40 mg by mouth daily. * furosemide 20 mg tablet Commonly known as:  LASIX TAKE 1 TABLET BY MOUTH EVERY DAY  
  
 hydrOXYzine pamoate 25 mg capsule Commonly known as:  VISTARIL  
TAKE 1 CAPSULE BY MOUTH THREE TIMES DAILY AS NEEDED FOR ITCHING  
  
 imipramine 25 mg tablet Commonly known as:  TOFRANIL Take 3 Tabs by mouth nightly for 90 days. insulin glargine 100 unit/mL (3 mL) Inpn Commonly known as:  LANTUS,BASAGLAR  
by SubCUTAneous route two (2) times a day. 80mg am and 80mg pm.  
  
 mupirocin calcium 2 % topical cream  
Commonly known as:  BACTROBAN  
APPLY EXTERNALLY TO THE AFFECTED AREA EVERY DAY AS DIRECTED  
  
 naftifine 1 % topical cream  
Commonly known as:  NAFTIN  
APPLY EXTERNALLY TO THE AFFECTED AREA EVERY DAY FOR 2 WEEKS OR AS DIRECTED NovoLOG Flexpen U-100 Insulin 100 unit/mL Inpn Generic drug:  insulin aspart U-100  
by SubCUTAneous route Before breakfast, lunch, dinner and at bedtime. Sliding scale  
  
 nystatin-triamcinolone topical cream  
Commonly known as:  MYCOLOG II  
APPLY EXTERNALLY TO THE AFFECTED AREA TWICE DAILY pantoprazole 40 mg tablet Commonly known as:  PROTONIX Take 1 Tab by mouth daily. risperiDONE 1 mg tablet Commonly known as:  RisperDAL Take 2 Tabs by mouth two (2) times a day. Indications: psychosis  
  
 silver sulfADIAZINE 1 % topical cream  
Commonly known as:  SILVADENE Apply  to affected area daily. Apply daily as directed * Notice: This list has 2 medication(s) that are the same as other medications prescribed for you. Read the directions carefully, and ask your doctor or other care provider to review them with you. We Performed the Following  DIABETES EYE EXAM [HM6 Custom] [id number] [facility] [ [facility]] REFERRAL TO OPHTHALMOLOGY [REF57 Custom] Follow-up Instructions Return in about 1 year (around 6/14/2019). Referral Information Referral ID Referred By Referred To  
  
 [id number] [address] Merlin Esqueda M.D.   
   [facility], [address] Visits Status Start Date End Date 1 New Request 6/14/18 6/14/19 If your referral has a status of pending review or denied, additional information will be sent to support the outcome of this decision. Patient Instructions Stay active Prevent falls Follow up with your specialists Provide us with Renetta Hernandez's contact information at [address] [place] [address] & HEALTH SERVICES! Zaida Chambers introduces [employer] patient portal. Now you can access parts of your medical record, email your doctor's office, and request medication refills online. 1. In your internet browser, go to [email or web address] 2. Click on the First Time User? Click Here link in the Sign In box. You will see the New Member Sign Up page. 3. Enter your [employer] Access Code exactly as it appears below. You will not need to use this code after youve completed the sign-up process. If you do not sign up before the expiration date, you must request a new code. · [employer] Access Code: [email or web address] Expires: 9/12/2018  2:52 PM 
 
4. Enter the last four digits of your Social Security Number (xxxx) and Date of Birth (mm/dd/yyyy) as indicated and click Submit. You will be taken to the next sign-up page. 5. Create a Member[employer] ID. This will be your [employer] login ID and cannot be changed, so think of one that is secure and easy to remember. 6. Create a [employer] password. You can change your password at any time. 7. Enter your Password Reset Question and Answer. This can be used at a later time if you forget your password. 8. Enter your e-mail address. You will receive e-mail notification when new information is available in [address] 9. Click Sign Up. You can now view and download portions of your medical record. 10. Click the Download Summary menu link to download a portable copy of your medical information. If you have questions, please visit the Frequently Asked Questions section of the [employer] website.  Remember, [employer] is NOT to be used for urgent needs. For medical emergencies, dial 911. Now available from your iPhone and Android! Please provide this summary of care documentation to your next provider. Your primary care clinician is listed as Jonathan Woods. If you have any questions after today's visit, please call [phone].

## 2018-06-14 NOTE — PROGRESS NOTES
Prakash Godfrey is a 35 y.o. female      Issues discussed today include:    Thurmond Frankel     GYN:  She is not sexually active  Mammogram:  Not yet  LMP:  irregular  last pap:  She does not  DEXA:  Not yet     Hearing screen:   done  Vision screening:  Needs 2018  Depression screening:   Done, PHQ9 = 0 (was 2 2017)  Fall assessment:   done      We discussed health maintenance    BMI = due to PWS, morbid obesity remains an issue    We discussed diet/exercise/healthy weight    We reviewed and updated pertinent past medical history in the problem list    Colonoscopy:  Not yet  TDAP:  2010  Pneumovax:  2011  PCV-13:  2015  Flu shot:  2017  Zostavax:  Not yet  Eye exam:  Needs 2018  EKG: On file       Advanced directive:  Full code. I spoke with mom 6/14/2018. If something ever happens to mom, contact [facility], Jeanette Auguste,  (mom will try to provide contact information)  Specialists:  OP Radha Vinson  CKD [place]  Sleep Aaron Garb  CV [address]  psych Raynaldo Jas  ENT Jil Lemus      FTF for DME:  done:  CPAP 8, bath lift, transport chair        Data reviewed or ordered today:  a1c 6.1 5/2018 with Dr. Viky Meléndez.   See scan from Dr. 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    CHF (congestive heart failure) ([address] I50.9    Lymphedema I89.0    Anxiety disorder F41.9    Scoliosis M41.9    DM (diabetes mellitus), type 2 ([address] E11.9    HTN (hypertension) I10    Hyperlipidemia E78.5    Sleep apnea G47.30    Bleeding hemorrhoids K64.9    CKD (chronic kidney disease) N18.9    Urinary incontinence R32    MR (mental retardation), moderate F71    Psychosis F29    Closed fracture of right foot with routine healing S92.901D    Obesity, morbid ([address] E66.01    Skin abscess L02.91    Cellulitis, abdominal wall L03.311    Open wound of abdominal wall with complication D34.796D       Medications:  Current Outpatient Prescriptions   Medication Sig Dispense Refill    furosemide (LASIX) 40 mg tablet Take 40 mg by mouth daily.  mupirocin calcium (BACTROBAN) 2 % topical cream APPLY EXTERNALLY TO THE AFFECTED AREA EVERY DAY AS DIRECTED 30 g 1    ALPRAZolam (XANAX) 0.25 mg tablet TAKE 1 TABLET BY MOUTH THREE TIMES DAILY AS NEEDED FOR ANXIETY 90 Tab 0    imipramine (TOFRANIL) 25 mg tablet Take 3 Tabs by mouth nightly for 90 days. 270 Tab 3    pantoprazole (PROTONIX) 40 mg tablet Take 1 Tab by mouth daily. 30 Tab 11    ammonium lactate (AMLACTIN) 12 % topical cream APPLY TO THE AFFECTED AREA AND RUB IN WELL TWICE DAILY 280 g 0    nystatin-triamcinolone (MYCOLOG II) topical cream APPLY EXTERNALLY TO THE AFFECTED AREA TWICE DAILY 240 g 0    naftifine (NAFTIN) 1 % topical cream APPLY EXTERNALLY TO THE AFFECTED AREA EVERY DAY FOR 2 WEEKS OR AS DIRECTED 60 g 0    albuterol (PROVENTIL HFA, VENTOLIN HFA, PROAIR HFA) 90 mcg/actuation inhaler Take 1 Puff by inhalation every four (4) hours as needed for Wheezing. Dispense 8g inhaler. 1 Inhaler 0    risperiDONE (RISPERDAL) 1 mg tablet Take 2 Tabs by mouth two (2) times a day. Indications: psychosis 360 Tab 3    silver sulfADIAZINE (SILVADENE) 1 % topical cream Apply  to affected area daily. Apply daily as directed 85 g 1    insulin glargine (LANTUS,BASAGLAR) 100 unit/mL (3 mL) inpn by SubCUTAneous route two (2) times a day. 80mg am and 80mg pm.      insulin aspart (NOVOLOG FLEXPEN) 100 unit/mL inpn by SubCUTAneous route Before breakfast, lunch, dinner and at bedtime. Sliding scale      furosemide (LASIX) 20 mg tablet TAKE 1 TABLET BY MOUTH EVERY DAY 90 Tab 0    Adhesive Bandage (TENDRA MEPILEX BORDER) 6 X 8 \" bndg 1 Each by Apply Externally route daily.  One, T14.8, 2x4cm open area with bloody exudate 60 Each 12    hydrOXYzine (VISTARIL) 25 mg capsule TAKE 1 CAPSULE BY MOUTH THREE TIMES DAILY AS NEEDED FOR ITCHING 30 Cap 0    fluticasone (FLONASE) 50 mcg/actuation nasal spray 2 Sprays by Both Nostrils route daily. 1 Bottle 6    fenofibrate nanocrystallized (TRICOR) 145 mg tablet TAKE 1 TABLET BY MOUTH DAILY 90 Tab 0       Allergies: Allergies   Allergen Reactions    Bactrim [Sulfamethoprim Ds] Other (comments)     Nausea, abdominal pain and cramping      Byetta [Exenatide] Rash       LMP:  No LMP recorded. Social History     Social History    Marital status: SINGLE     Spouse name: N/A    Number of children: N/A    Years of education: N/A     Occupational History    Not on file. Social History Main Topics    Smoking status: Never Smoker    Smokeless tobacco: Never Used    Alcohol use No    Drug use: No    Sexual activity: No     Other Topics Concern    Not on file     Social History Narrative         Family History   Problem Relation Age of Onset    Hypertension Mother     Hypertension Father     MS Brother          Meaningful use:  done      ROS:  Headaches:  no  Chest Pain:  no  SOB:  no  Fevers:  no  Falls:  no  anxiety/depression/losing interest in doing things that were previously enjoyed:  no. PHQ2 = 0, PHQ9 = 0  Other significant ROS:  She needs OP exam soon and sees her cardiologist soon  Patient denied problems with:    Hearing/vision, speaking/swallowing, Reflux/indigestion, Cough,Diarrhea/constipation,Problems passing or controlling urine,  Mood (anxiety/depression/losing interest in doing things that were previously enjoyed), Fatigue, Weight change, memory                                                         Any other Positive ROS include: GERD on PPI, CODY on CPAP        Falls in the past 12 months:  no           Over the last year (or since your last visit):  Have you been diagnosed with heart attack, stroke, broken bones, or skin cancer = no    Exercise:  Needs regular exercise             Smoking history:  none                                      No LMP recorded.     Physical Exam  Visit Vitals    Pulse (!) 104    Temp 98.5 °F (36.9 °C) (Axillary)    Resp 20    Ht 4' 7\" (1.397 m)    Wt 308 lb (139.7 kg)    SpO2 96%    BMI 71.59 kg/m2     BP Readings from Last 3 Encounters:   05/18/18 135/81   03/19/18 100/60   08/18/17 120/74     Constitutional:  Appears well,  No Acute Distress, Vitals noted  Psychiatric:   Affect stable MR, Alert and cooperative    Eyes:   Pupils equally round and reactive, EOMI, conjunctiva clear, eyelids normal  ENT:   External ears and nose normal/lips, teeth=OK/gums normal, TMs and Orophyarynx normal  Neck:   general inspection and Thyroid normal.  No abnormal cervical or supraclavicular nodes    Lungs:   clear to auscultation, good respiratory effort  Heart: Ausculation normal.  Regular rhythm. No cardiac murmurs. No carotid bruits or palpable thrills  Chest wall normal  Abdominal exam:   normal.  Liver and spleen normal.  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    CHF (congestive heart failure) ([facility] I50.9    Lymphedema I89.0    Anxiety disorder F41.9    Scoliosis M41.9    DM (diabetes mellitus), type 2 ([facility] E11.9    HTN (hypertension) I10    Hyperlipidemia E78.5    Sleep apnea G47.30    Bleeding hemorrhoids K64.9    CKD (chronic kidney disease) N18.9    Urinary incontinence R32    MR (mental retardation), moderate F71    Psychosis F29    Closed fracture of right foot with routine healing S92.901D    Obesity, morbid ([facility]) E66.01    Skin abscess L02.91    Cellulitis, abdominal wall L03.311    Open wound of abdominal wall with complication H40.683H       Today's diagnoses are:    ICD-10-CM ICD-9-CM    1. Health care maintenance Z00.00 V70.0    2. Congestive heart failure, unspecified HF chronicity, unspecified heart failure type ([facility]) I50.9 428.0    3.  Type 2 diabetes mellitus without complication, unspecified whether long term insulin use ([facility]) E11.9 250.00 REFERRAL TO OPHTHALMOLOGY       DIABETES EYE EXAM   4. Obesity, morbid ([facility]) E66.01 278.01     long standing due to PWS   5. Advance directive in chart Z78.9 V49.89     see ACP note   6. Screening for depression Z13.89 V79.0 [place] DEPRESSION SCREEN ANNUAL       Plan:  Orders Placed This Encounter    REFERRAL TO OPHTHALMOLOGY     Referral Priority:   Routine     Referral Type:   Consultation     Referral Reason:   Specialty Services Required     Referral Location:   [facility]. Vikki Daily M.D. Referred to Provider:   Cassidy Mg MD     Requested Specialty:   Ophthalmology     DIABETES EYE Venus Ortiz [place] DEPRESSION SCREEN ANNUAL       See patient instructions  Patient Instructions   Stay active    Prevent falls    Follow up with your specialists            refresh note:  done    AVS Printed:  done    Diagnoses and all orders for this visit:    1. Health care maintenance    2. Congestive heart failure, unspecified HF chronicity, unspecified heart failure type [facility])  Assessment & Plan: This condition is managed by Specialist.  Key CAD CHF Meds             furosemide (LASIX) 40 mg tablet  (Taking) Take 40 mg by mouth daily. furosemide (LASIX) 20 mg tablet  (Taking) TAKE 1 TABLET BY MOUTH EVERY DAY    fenofibrate nanocrystallized (TRICOR) 145 mg tablet  (Taking) TAKE 1 TABLET BY MOUTH DAILY        [id number](BNP,BNPP,BNPPPOC,HSCRP,NA,NAPOC,K,KPOCT,CHOL,CHOLPOCT,HDL,HDLPOC,LDLCHOL,LDLPOCT,LDLCPOC,LDLC,LDL,LDLCEXT,TRIGL,TGLPOCT,INR,INREXT,INRPOC,PTP,PTINR,PTEXT,DIG)@      3. Type 2 diabetes mellitus without complication, unspecified whether long term insulin use ([address]  Assessment & Plan: This condition is managed by Specialist.  Key Antihyperglycemic Medications             insulin glargine (LANTUS,BASAGLAR) 100 unit/mL (3 mL) inpn  (Taking) by SubCUTAneous route two (2) times a day.  80mg am and 80mg pm. insulin aspart (NOVOLOG FLEXPEN) 100 unit/mL inpn  (Taking) by SubCUTAneous route Before breakfast, lunch, dinner and at bedtime. Sliding scale        Other Key Diabetic Medications             fenofibrate nanocrystallized (TRICOR) 145 mg tablet  (Taking) TAKE 1 TABLET BY MOUTH DAILY        Lab Results   Component Value Date/Time    Hemoglobin A1c 8.6 06/13/2017 11:54 AM    Glucose CANCELED 06/13/2017 11:54 AM    Creatinine CANCELED 06/13/2017 11:54 AM    Microalbumin/creat ratio (POC) <30 06/13/2017 01:30 PM    Cholesterol, total CANCELED 06/13/2017 11:54 AM    HDL Cholesterol CANCELED 06/13/2017 11:54 AM    LDL,Direct 107 06/13/2017 11:54 AM     Diabetic Foot and Eye Exam  Status   Topic Date Due    Eye Exam  08/29/2018 (Originally 4/21/2017)   Kade Pineda Diabetic Foot Care  03/13/2019       Orders:  -     REFERRAL TO OPHTHALMOLOGY  -      DIABETES EYE EXAM    4. Obesity, morbid ([address]  Comments:  long standing due to PWS  Assessment & Plan:  Uncontrolled, based on history, physical exam and review of pertinent labs, studies and medications; meds reconciled; continue current treatment plan, medication compliance emphasized, PWS. Key Obesity Meds             furosemide (LASIX) 40 mg tablet  (Taking) Take 40 mg by mouth daily. furosemide (LASIX) 20 mg tablet  (Taking) TAKE 1 TABLET BY MOUTH EVERY DAY        Lab Results   Component Value Date/Time    Hemoglobin A1c 8.6 06/13/2017 11:54 AM    Glucose CANCELED 06/13/2017 11:54 AM    Cholesterol, total CANCELED 06/13/2017 11:54 AM    HDL Cholesterol CANCELED 06/13/2017 11:54 AM    LDL,Direct 107 06/13/2017 11:54 AM    Sodium CANCELED 06/13/2017 11:54 AM    Potassium CANCELED 06/13/2017 11:54 AM    ALT (SGPT) CANCELED 06/13/2017 11:54 AM    VITAMIN D, 25-HYDROXY CANCELED 06/13/2017 11:54 AM             5. Advance directive in chart  Comments:  see ACP note    6.  Screening for depression  -     [id number] [employer]

## 2018-06-18 RX ORDER — NYSTATIN AND TRIAMCINOLONE ACETONIDE 100000; 1 [USP'U]/G; MG/G
CREAM TOPICAL
Qty: 240 G | Refills: 0 | Status: SHIPPED | OUTPATIENT
Start: 2018-06-18 | End: 2018-07-16 | Stop reason: SDUPTHER

## 2018-06-20 DIAGNOSIS — F29 PSYCHOSIS, UNSPECIFIED PSYCHOSIS TYPE (HCC): ICD-10-CM

## 2018-06-20 NOTE — TELEPHONE ENCOUNTER
----- Message from Ly Grullon sent at 6/20/2018  2:25 PM EDT -----  Regarding: Dr. Reggie Watson  Please send a prescription for Respiradom to [facility] pharmacy at [phone]. The patient's number is [phone].

## 2018-06-21 ENCOUNTER — TELEPHONE (OUTPATIENT)
Dept: FAMILY MEDICINE CLINIC | Age: 33
End: 2018-06-21

## 2018-06-21 RX ORDER — RISPERIDONE 1 MG/1
2 TABLET, FILM COATED ORAL 2 TIMES DAILY
Qty: 360 TAB | Refills: 3 | Status: SHIPPED | OUTPATIENT
Start: 2018-06-21 | End: 2019-03-20 | Stop reason: SDUPTHER

## 2018-06-21 NOTE — TELEPHONE ENCOUNTER
Medication requires prior auth   risperiDONE (RISPERDAL) 1 mg tablet [[id number]    Scanned to encounter.

## 2018-06-23 RX ORDER — AMMONIUM LACTATE 12 G/100G
CREAM TOPICAL
Qty: 280 G | Refills: 0 | Status: SHIPPED | OUTPATIENT
Start: 2018-06-23 | End: 2018-08-15 | Stop reason: SDUPTHER

## 2018-06-25 ENCOUNTER — TELEPHONE (OUTPATIENT)
Dept: FAMILY MEDICINE CLINIC | Age: 33
End: 2018-06-25

## 2018-06-25 NOTE — TELEPHONE ENCOUNTER
April of [facility] called regarding this medication as saying the patient should not be off it. They have already given the patient an emergency supply.

## 2018-06-25 NOTE — TELEPHONE ENCOUNTER
Mother, Meagan Blanco, called to say the patient has an appt on  July 19 @10:00 with Dr. Shana Irwin, eye doctor.     Ph [phone]

## 2018-06-26 NOTE — TELEPHONE ENCOUNTER
Returned call from [employer]. Spoke with [employer], insurance needed another diagnosis code for medication. [employer] mentioned that request was put in for new therapy, I informed her that patient has been on medication for a quite some time and had given dates, [employer] did not have any of that information in system. Information updated at time of call.

## 2018-06-26 NOTE — TELEPHONE ENCOUNTER
Spoke with Ms. Jerica Gay, received number to call. The [employer] prior authorization request has been submitted for urgent review. Informed by representative Carla Orosco, will take 24 hours for determination. Will receive fax.     Order # [id number]

## 2018-06-26 NOTE — TELEPHONE ENCOUNTER
Dr. Aura Patel  Received: Today       [facility] Front Office       Phone Number: [phone]                     June from [place] stated clarification is needed on Pt Dx for the prior auth for Rx \"Risperidon 1mg tab\". Please call [phone]. ... This is urgent they need a response before 8am 06/27/18. ..

## 2018-06-26 NOTE — TELEPHONE ENCOUNTER
Spoke with [facility], informed them that the help desk number given was incorrect, requested different number, pharmacy stated that is the number listed on form. [phone] called, given new number for [employer], number called, was told patient has not had pharmacy benefits since 2014. Attempted to call secondary insurance, Kathie, number provided by patient's mother, was redirected again back to Sherlyn Erazo. Call placed to patient's mother to reach out to insurance to obtain a valid number to call for prior authorization.

## 2018-06-26 NOTE — TELEPHONE ENCOUNTER
Dr. Sanjuanita Pedroza Telephone  Received: Today       Carolynn CERVANTES [facility] Front Office                     Howard Alcantara, is requested a callback from \"Shelia\" in regards to a prior authorization needing from the insurance company for a medication. She has a telephone number that she would like to give her.  Best contact: [phone].

## 2018-06-26 NOTE — TELEPHONE ENCOUNTER
Dr Katyh Rajan  Received:  Today       Immanuel Medina [facility] [address] [facility]                     [place], needs clarification/additional information on Prior Authorization request received today 06/26 from [address] [place] [address] contact # [phone]

## 2018-07-16 DIAGNOSIS — L08.9 SKIN INFECTION: Primary | ICD-10-CM

## 2018-07-17 RX ORDER — NYSTATIN AND TRIAMCINOLONE ACETONIDE 100000; 1 [USP'U]/G; MG/G
CREAM TOPICAL
Qty: 240 G | Refills: 0 | Status: SHIPPED | OUTPATIENT
Start: 2018-07-17 | End: 2018-08-20 | Stop reason: SDUPTHER

## 2018-07-23 RX ORDER — MUPIROCIN 20 MG/G
OINTMENT TOPICAL
Qty: 22 G | Refills: 1 | Status: SHIPPED | OUTPATIENT
Start: 2018-07-23 | End: 2018-10-19 | Stop reason: SDUPTHER

## 2018-08-06 ENCOUNTER — OFFICE VISIT (OUTPATIENT)
Dept: FAMILY MEDICINE CLINIC | Age: 33
End: 2018-08-06

## 2018-08-06 VITALS
TEMPERATURE: 98.1 F | HEIGHT: 55 IN | HEART RATE: 91 BPM | BODY MASS INDEX: 67.81 KG/M2 | RESPIRATION RATE: 18 BRPM | OXYGEN SATURATION: 93 % | WEIGHT: 293 LBS

## 2018-08-06 DIAGNOSIS — S31.109A WOUND OF RIGHT GROIN, INITIAL ENCOUNTER: Primary | ICD-10-CM

## 2018-08-06 NOTE — MR AVS SNAPSHOT
[address] 
[phone] Patient: Sandhya Kendrick MRN: [id number] GJV:[id number] Visit Information Date & Time Provider Department Dept. Phone Encounter #  
 8/6/2018  9:30 AM Dain Daigle [facility] [phone] [id number] Follow-up Instructions Return if symptoms worsen or fail to improve. Upcoming Health Maintenance Date Due Influenza Age 5 to Adult 10/6/2018* EYE EXAM RETINAL OR DILATED Q1 6/14/2019* HEMOGLOBIN A1C Q6M 11/1/2018 MICROALBUMIN Q1 11/14/2018 LIPID PANEL Q1 11/14/2018 FOOT EXAM Q1 3/13/2019 MEDICARE YEARLY EXAM 6/15/2019 DTaP/Tdap/Td series (2 - Td) 9/1/2026 *Topic was postponed. The date shown is not the original due date. Allergies as of 8/6/2018  Review Complete On: 8/6/2018 By: Radha Wilkerson Severity Noted Reaction Type Reactions Bactrim [Sulfamethoprim Ds]  11/09/2015    Other (comments) Nausea, abdominal pain and cramping Byetta [Exenatide]  10/21/2010    Rash Current Immunizations  Reviewed on 6/14/2018 Name Date Influenza Vaccine 10/20/2015, 10/8/2013 Influenza Vaccine (Quad) PF 8/29/2017, 9/8/2016 Influenza Vaccine PF 9/14/2012 Pneumococcal Conjugate (PCV-13) 5/5/2015 Pneumococcal Vaccine (Unspecified Type) 2/2/2011 TD Vaccine 12/2/2010 Not reviewed this visit You Were Diagnosed With   
  
 Codes Comments Wound of right groin, initial encounter    -  Primary ICD-10-CM: S31.109A ICD-9-CM: 879.4 Vitals Pulse Temp Resp Height(growth percentile) Weight(growth percentile) SpO2  
 91 98.1 °F (36.7 °C) (Oral) 18 4' 7\" (1.397 m) 309 lb (140.2 kg) 93% BMI OB Status Smoking Status 71.82 kg/m2 Unknown Never Smoker Vitals History BMI and BSA Data Body Mass Index Body Surface Area  
 71.82 kg/m 2 2.33 m 2 Preferred Pharmacy Pharmacy Name Phone [id number] [facility] [phone] Your Updated Medication List  
  
   
This list is accurate as of 8/6/18 10:12 AM.  Always use your most recent med list.  
  
  
  
  
 Adhesive Bandage 6 X 8 \" Bndg  
1 Each by Apply Externally route daily. Mepilex Transfers 6x8 inches. T14.8, 2x2cm open area with moderate drainage 1mm deep. 1 box. albuterol 90 mcg/actuation inhaler Commonly known as:  PROVENTIL HFA, VENTOLIN HFA, PROAIR HFA Take 1 Puff by inhalation every four (4) hours as needed for Wheezing. Dispense 8g inhaler. ALPRAZolam 0.25 mg tablet Commonly known as:  XANAX  
TAKE 1 TABLET BY MOUTH THREE TIMES DAILY AS NEEDED FOR ANXIETY  
  
 ammonium lactate 12 % topical cream  
Commonly known as:  AMLACTIN  
APPLY TO THE AFFECTED AREA AND RUB IN WELL TWICE DAILY  
  
 fenofibrate nanocrystallized 145 mg tablet Commonly known as:  TRICOR  
TAKE 1 TABLET BY MOUTH DAILY  
  
 fluticasone 50 mcg/actuation nasal spray Commonly known as:  Lindalee Edward 2 Sprays by Both Nostrils route daily. * furosemide 40 mg tablet Commonly known as:  LASIX Take 40 mg by mouth daily. * furosemide 20 mg tablet Commonly known as:  LASIX TAKE 1 TABLET BY MOUTH EVERY DAY  
  
 hydrOXYzine pamoate 25 mg capsule Commonly known as:  VISTARIL  
TAKE 1 CAPSULE BY MOUTH THREE TIMES DAILY AS NEEDED FOR ITCHING  
  
 imipramine 25 mg tablet Commonly known as:  TOFRANIL Take 3 Tabs by mouth nightly for 90 days. insulin glargine 100 unit/mL (3 mL) Inpn Commonly known as:  LANTUSBASAGLAR  
by SubCUTAneous route two (2) times a day. 80mg am and 80mg pm.  
  
 mupirocin 2 % ointment Commonly known as:  BACTROBAN  
APPLY TO AFFECTED AREA DAILY AS DIRECTED  
  
 mupirocin calcium 2 % topical cream  
Commonly known as:  BACTROBAN  
APPLY EXTERNALLY TO THE AFFECTED AREA EVERY DAY AS DIRECTED  
  
 naftifine 1 % topical cream  
Commonly known as:  NAFTIN  
APPLY EXTERNALLY TO THE AFFECTED AREA EVERY DAY FOR 2 WEEKS OR AS DIRECTED NovoLOG Flexpen U-100 Insulin 100 unit/mL Inpn Generic drug:  insulin aspart U-100  
by SubCUTAneous route Before breakfast, lunch, dinner and at bedtime. Sliding scale  
  
 nystatin-triamcinolone topical cream  
Commonly known as:  MYCOLOG II  
APPLY EXTERNALLY TO THE AFFECTED AREA TWICE DAILY pantoprazole 40 mg tablet Commonly known as:  PROTONIX Take 1 Tab by mouth daily. risperiDONE 1 mg tablet Commonly known as:  RisperDAL Take 2 Tabs by mouth two (2) times a day. Indications: psychosis  
  
 silver sulfADIAZINE 1 % topical cream  
Commonly known as:  SILVADENE Apply  to affected area daily. Apply daily as directed * Notice: This list has 2 medication(s) that are the same as other medications prescribed for you. Read the directions carefully, and ask your doctor or other care provider to review them with you. Prescriptions Printed Refills Adhesive Bandage 6 X 8 \" bndg 2 Si Each by Apply Externally route daily. Mepilex Transfers 6x8 inches. T14.8, 2x2cm open area with moderate drainage 1mm deep. 1 box. Class: Print Route: Apply Externally Follow-up Instructions Return if symptoms worsen or fail to improve. Introducing [facility]! [facility] introduces [employer] patient portal. Now you can access parts of your medical record, email your doctor's office, and request medication refills online. 1. In your internet browser, go to [email or web address] 2. Click on the First Time User? Click Here link in the Sign In box. You will see the New Member Sign Up page. 3. Enter your [employer] Access Code exactly as it appears below. You will not need to use this code after youve completed the sign-up process.  If you do not sign up before the expiration date, you must request a new code. · [employer] Access Code: [email or web address] Expires: 9/12/2018  2:52 PM 
 
4. Enter the last four digits of your Social Security Number (xxxx) and Date of Birth (mm/dd/yyyy) as indicated and click Submit. You will be taken to the next sign-up page. 5. Create a [employer] ID. This will be your [employer] login ID and cannot be changed, so think of one that is secure and easy to remember. 6. Create a [employer] password. You can change your password at any time. 7. Enter your Password Reset Question and Answer. This can be used at a later time if you forget your password. 8. Enter your e-mail address. You will receive e-mail notification when new information is available in [address] 9. Click Sign Up. You can now view and download portions of your medical record. 10. Click the Download Summary menu link to download a portable copy of your medical information. If you have questions, please visit the Frequently Asked Questions section of the [employer] website. Remember, [employer] is NOT to be used for urgent needs. For medical emergencies, dial 911. Now available from your iPhone and Android! Please provide this summary of care documentation to your next provider. Your primary care clinician is listed as Kunal Rothman. If you have any questions after today's visit, please call [phone].

## 2018-08-06 NOTE — PROGRESS NOTES
Chief Complaint   Patient presents with    Other     Mother states patient have skin tears in groin folds     1. Have you been to the ER, urgent care clinic since your last visit? Hospitalized since your last visit? No    2. Have you seen or consulted any other health care providers outside of the [address] [place] [address] since your last visit? Include any pap smears or colon screening.  No

## 2018-08-06 NOTE — PROGRESS NOTES
History of Present Illness:     Chief Complaint   Patient presents with    Other     Mother states patient have skin tears in groin folds     Pt is a 35y.o. year old female    Presents to clinic for right groin wound. Mother has been applying Mipelix pads and Bacitracin ointment. Wound is healing but they ran out of the bandages. Misael Lewis has had a good summer so far. Enjoys the pool at the [facility] but cannot go due to the current wound. Past Medical History:   Diagnosis Date    Anxiety disorder     Severe    Congestive heart failure, unspecified     Diabetes ([address]     Hypertension     Ill-defined condition     lymphedema    Lymph edema     Obesity     Prader - Willi syndrome     Sleep apnea     cpap         Current Outpatient Prescriptions on File Prior to Visit   Medication Sig Dispense Refill    mupirocin (BACTROBAN) 2 % ointment APPLY TO AFFECTED AREA DAILY AS DIRECTED 22 g 1    nystatin-triamcinolone (MYCOLOG II) topical cream APPLY EXTERNALLY TO THE AFFECTED AREA TWICE DAILY 240 g 0    ammonium lactate (AMLACTIN) 12 % topical cream APPLY TO THE AFFECTED AREA AND RUB IN WELL TWICE DAILY 280 g 0    risperiDONE (RISPERDAL) 1 mg tablet Take 2 Tabs by mouth two (2) times a day. Indications: psychosis (Patient taking differently: Take 2 mg by mouth two (2) times a day. Indications: psychosis, states she takes 2 mg) 360 Tab 3    furosemide (LASIX) 40 mg tablet Take 40 mg by mouth daily.  mupirocin calcium (BACTROBAN) 2 % topical cream APPLY EXTERNALLY TO THE AFFECTED AREA EVERY DAY AS DIRECTED 30 g 1    ALPRAZolam (XANAX) 0.25 mg tablet TAKE 1 TABLET BY MOUTH THREE TIMES DAILY AS NEEDED FOR ANXIETY 90 Tab 0    imipramine (TOFRANIL) 25 mg tablet Take 3 Tabs by mouth nightly for 90 days. 270 Tab 3    pantoprazole (PROTONIX) 40 mg tablet Take 1 Tab by mouth daily.  30 Tab 11    naftifine (NAFTIN) 1 % topical cream APPLY EXTERNALLY TO THE AFFECTED AREA EVERY DAY FOR 2 WEEKS OR AS DIRECTED 60 g 0    albuterol (PROVENTIL HFA, VENTOLIN HFA, PROAIR HFA) 90 mcg/actuation inhaler Take 1 Puff by inhalation every four (4) hours as needed for Wheezing. Dispense 8g inhaler. 1 Inhaler 0    silver sulfADIAZINE (SILVADENE) 1 % topical cream Apply  to affected area daily. Apply daily as directed 85 g 1    insulin glargine (LANTUS,BASAGLAR) 100 unit/mL (3 mL) inpn by SubCUTAneous route two (2) times a day. 80mg am and 80mg pm.      insulin aspart (NOVOLOG FLEXPEN) 100 unit/mL inpn by SubCUTAneous route Before breakfast, lunch, dinner and at bedtime. Sliding scale      furosemide (LASIX) 20 mg tablet TAKE 1 TABLET BY MOUTH EVERY DAY 90 Tab 0    hydrOXYzine (VISTARIL) 25 mg capsule TAKE 1 CAPSULE BY MOUTH THREE TIMES DAILY AS NEEDED FOR ITCHING 30 Cap 0    fluticasone (FLONASE) 50 mcg/actuation nasal spray 2 Sprays by Both Nostrils route daily. 1 Bottle 6    fenofibrate nanocrystallized (TRICOR) 145 mg tablet TAKE 1 TABLET BY MOUTH DAILY 90 Tab 0     No current facility-administered medications on file prior to visit. Allergies: Allergies   Allergen Reactions    Bactrim [Sulfamethoprim Ds] Other (comments)     Nausea, abdominal pain and cramping      Byetta [Exenatide] Rash         Review of Systems:  No fever, chills, sweats      Objective:     Vitals:    08/06/18 0949   Pulse: 91   Resp: 18   Temp: 98.1 °F (36.7 °C)   TempSrc: Oral   SpO2: 93%   Weight: 309 lb (140.2 kg)   Height: 4' 7\" (1.397 m)       Physical Exam:  Skin:   2cm x 2cm x 1mm deep wound in right groin with yellow drainage. Area tender. No induration or extending erythema. Remaining skin folds are without skin tears, erythema or induration. Recent Labs:  No results found for this or any previous visit (from the past 12 hour(s)). Assessment and Plan:   Pt is a 35y.o. year old female,      ICD-10-CM ICD-9-CM    1.  Wound of right groin, initial encounter S31.109A 879.4 Adhesive Bandage 6 X 8 \" bndg     Refilled skin pads  May continue using Bacitracin ointment PRN  Follow up PRN    Luis Bray MD      I have discussed the diagnosis with the patient and the intended plan as seen in the above orders. The patient has received an after-visit summary and questions were answered concerning future plans.

## 2018-08-09 ENCOUNTER — OFFICE VISIT (OUTPATIENT)
Dept: FAMILY MEDICINE CLINIC | Age: 33
End: 2018-08-09

## 2018-08-09 ENCOUNTER — HOME HEALTH ADMISSION (OUTPATIENT)
Dept: HOME HEALTH SERVICES | Facility: HOME HEALTH | Age: 33
End: 2018-08-09
Payer: MEDICARE

## 2018-08-09 VITALS
HEIGHT: 55 IN | BODY MASS INDEX: 67.81 KG/M2 | WEIGHT: 293 LBS | TEMPERATURE: 98.6 F | OXYGEN SATURATION: 95 % | HEART RATE: 107 BPM

## 2018-08-09 DIAGNOSIS — S31.109D WOUND OF RIGHT GROIN, SUBSEQUENT ENCOUNTER: Primary | ICD-10-CM

## 2018-08-09 NOTE — PROGRESS NOTES
Katelyn Dowling is a 35 y.o. female  Chief Complaint   Patient presents with    Skin Problem     blistering and pain groin area, requesting home health referral     Visit Vitals    Pulse (!) 107    Temp 98.6 °F (37 °C) (Axillary)    Ht 4' 7\" (1.397 m)    Wt 308 lb 3.2 oz (139.8 kg)    SpO2 95%    BMI 71.63 kg/m2     1. Have you been to the ER, urgent care clinic since your last visit? Hospitalized since your last visit? No    2. Have you seen or consulted any other health care providers outside of the [address] [place] [address] since your last visit? Include any pap smears or colon screening. No  There are no preventive care reminders to display for this patient.

## 2018-08-09 NOTE — PROGRESS NOTES
History of Present Illness:     Chief Complaint   Patient presents with    Skin Problem     blistering and pain groin area, requesting home health referral     Pt is a 35y.o. year old female    Presents to clinic for concern of blister in skin fold of right groin. Mother has been dressing and continuing Bactroban ointment. Misael Lewis complains of pain. .       Review of Systems:  No fever, chills, sweats      Objective:     Vitals:    08/09/18 1027   Pulse: (!) 107   Temp: 98.6 °F (37 °C)   TempSrc: Axillary   SpO2: 95%   Weight: 308 lb 3.2 oz (139.8 kg)   Height: 4' 7\" (1.397 m)       Physical Exam:  General appearance - alert, well appearing, and in no distress and morbidly obese  Skin - Right groin area 7cm x 3 cm with erythema and tenderness. Small central ulceration that is not draining. Area is not indurated or fluctuant. Recent Labs:  No results found for this or any previous visit (from the past 12 hour(s)). Assessment and Plan:   Pt is a 35y.o. year old female,      ICD-10-CM ICD-9-CM    1. Wound of right groin, subsequent encounter S31.109D V58.89      879.4      Continue dressing changes   Will consult  wound care for assistance  Marked area of concern; if extending or worsening, will call in abx but not currentlyindicated    Will call mother tomorrow to inquire; she is very attentive to patient's care needs  Follow up CHANDU Duval MD      I have discussed the diagnosis with the patient and the intended plan as seen in the above orders. The patient has received an after-visit summary and questions were answered concerning future plans.

## 2018-08-11 ENCOUNTER — HOME CARE VISIT (OUTPATIENT)
Dept: SCHEDULING | Facility: HOME HEALTH | Age: 33
End: 2018-08-11
Payer: MEDICARE

## 2018-08-11 VITALS
DIASTOLIC BLOOD PRESSURE: 78 MMHG | BODY MASS INDEX: 67.81 KG/M2 | HEART RATE: 85 BPM | OXYGEN SATURATION: 97 % | TEMPERATURE: 98.1 F | HEIGHT: 55 IN | WEIGHT: 293 LBS | SYSTOLIC BLOOD PRESSURE: 134 MMHG | RESPIRATION RATE: 20 BRPM

## 2018-08-11 PROCEDURE — 3331090001 HH PPS REVENUE CREDIT

## 2018-08-11 PROCEDURE — 400013 HH SOC

## 2018-08-11 PROCEDURE — G0299 HHS/HOSPICE OF RN EA 15 MIN: HCPCS

## 2018-08-11 PROCEDURE — 3331090002 HH PPS REVENUE DEBIT

## 2018-08-12 PROCEDURE — 3331090002 HH PPS REVENUE DEBIT

## 2018-08-12 PROCEDURE — 3331090001 HH PPS REVENUE CREDIT

## 2018-08-13 PROCEDURE — A6212 FOAM DRG <=16 SQ IN W/BORDER: HCPCS

## 2018-08-13 PROCEDURE — A4216 STERILE WATER/SALINE, 10 ML: HCPCS

## 2018-08-13 PROCEDURE — 3331090001 HH PPS REVENUE CREDIT

## 2018-08-13 PROCEDURE — A6216 NON-STERILE GAUZE<=16 SQ IN: HCPCS

## 2018-08-13 PROCEDURE — 3331090002 HH PPS REVENUE DEBIT

## 2018-08-13 PROCEDURE — A6250 SKIN SEAL PROTECT MOISTURIZR: HCPCS

## 2018-08-14 PROCEDURE — 3331090002 HH PPS REVENUE DEBIT

## 2018-08-14 PROCEDURE — 3331090001 HH PPS REVENUE CREDIT

## 2018-08-15 ENCOUNTER — HOME CARE VISIT (OUTPATIENT)
Dept: SCHEDULING | Facility: HOME HEALTH | Age: 33
End: 2018-08-15
Payer: MEDICARE

## 2018-08-15 ENCOUNTER — TELEPHONE (OUTPATIENT)
Dept: FAMILY MEDICINE CLINIC | Age: 33
End: 2018-08-15

## 2018-08-15 PROCEDURE — 3331090001 HH PPS REVENUE CREDIT

## 2018-08-15 PROCEDURE — G0299 HHS/HOSPICE OF RN EA 15 MIN: HCPCS

## 2018-08-15 PROCEDURE — A5120 SKIN BARRIER, WIPE OR SWAB: HCPCS

## 2018-08-15 PROCEDURE — 3331090002 HH PPS REVENUE DEBIT

## 2018-08-15 NOTE — TELEPHONE ENCOUNTER
BODØ with [address] calling from ph [phone]  Certified Nurse checked patient wound and suggest crusting powder and skin prep.       thanks

## 2018-08-15 NOTE — TELEPHONE ENCOUNTER
Returned Kenya Loera at [facility] call, unavailable , LVM to call the office.

## 2018-08-16 VITALS
SYSTOLIC BLOOD PRESSURE: 110 MMHG | DIASTOLIC BLOOD PRESSURE: 76 MMHG | HEART RATE: 100 BPM | RESPIRATION RATE: 18 BRPM | OXYGEN SATURATION: 96 % | TEMPERATURE: 97.4 F

## 2018-08-16 PROCEDURE — 3331090001 HH PPS REVENUE CREDIT

## 2018-08-16 PROCEDURE — 3331090002 HH PPS REVENUE DEBIT

## 2018-08-16 RX ORDER — AMMONIUM LACTATE 12 G/100G
CREAM TOPICAL
Qty: 280 G | Refills: 0 | Status: SHIPPED | OUTPATIENT
Start: 2018-08-16 | End: 2018-10-19 | Stop reason: SDUPTHER

## 2018-08-16 NOTE — TELEPHONE ENCOUNTER
Telma Starr from [facility] states patient wound was checked and suggest crusting powder and skin prep. Per Dr Chel West request to give verbal order for crusting powder and skin prep. Telma Starr verbalized understanding.

## 2018-08-17 PROCEDURE — 3331090001 HH PPS REVENUE CREDIT

## 2018-08-17 PROCEDURE — 3331090002 HH PPS REVENUE DEBIT

## 2018-08-18 PROCEDURE — 3331090002 HH PPS REVENUE DEBIT

## 2018-08-18 PROCEDURE — 3331090001 HH PPS REVENUE CREDIT

## 2018-08-19 PROCEDURE — 3331090002 HH PPS REVENUE DEBIT

## 2018-08-19 PROCEDURE — 3331090001 HH PPS REVENUE CREDIT

## 2018-08-20 PROCEDURE — 3331090002 HH PPS REVENUE DEBIT

## 2018-08-20 PROCEDURE — 3331090001 HH PPS REVENUE CREDIT

## 2018-08-20 RX ORDER — NYSTATIN AND TRIAMCINOLONE ACETONIDE 100000; 1 [USP'U]/G; MG/G
CREAM TOPICAL
Qty: 240 G | Refills: 0 | Status: SHIPPED | OUTPATIENT
Start: 2018-08-20 | End: 2018-10-01 | Stop reason: SDUPTHER

## 2018-08-21 PROCEDURE — 3331090002 HH PPS REVENUE DEBIT

## 2018-08-21 PROCEDURE — 3331090001 HH PPS REVENUE CREDIT

## 2018-08-22 PROCEDURE — 3331090002 HH PPS REVENUE DEBIT

## 2018-08-22 PROCEDURE — 3331090001 HH PPS REVENUE CREDIT

## 2018-08-23 ENCOUNTER — HOME CARE VISIT (OUTPATIENT)
Dept: SCHEDULING | Facility: HOME HEALTH | Age: 33
End: 2018-08-23
Payer: MEDICARE

## 2018-08-23 VITALS
TEMPERATURE: 98 F | SYSTOLIC BLOOD PRESSURE: 128 MMHG | RESPIRATION RATE: 20 BRPM | DIASTOLIC BLOOD PRESSURE: 68 MMHG | OXYGEN SATURATION: 97 % | HEART RATE: 106 BPM

## 2018-08-23 PROCEDURE — 3331090001 HH PPS REVENUE CREDIT

## 2018-08-23 PROCEDURE — A6250 SKIN SEAL PROTECT MOISTURIZR: HCPCS

## 2018-08-23 PROCEDURE — G0299 HHS/HOSPICE OF RN EA 15 MIN: HCPCS

## 2018-08-23 PROCEDURE — 3331090002 HH PPS REVENUE DEBIT

## 2018-08-24 PROCEDURE — 3331090001 HH PPS REVENUE CREDIT

## 2018-08-24 PROCEDURE — 3331090002 HH PPS REVENUE DEBIT

## 2018-08-25 PROCEDURE — 3331090001 HH PPS REVENUE CREDIT

## 2018-08-25 PROCEDURE — 3331090002 HH PPS REVENUE DEBIT

## 2018-08-26 PROCEDURE — 3331090002 HH PPS REVENUE DEBIT

## 2018-08-26 PROCEDURE — 3331090001 HH PPS REVENUE CREDIT

## 2018-08-27 PROCEDURE — 3331090001 HH PPS REVENUE CREDIT

## 2018-08-27 PROCEDURE — 3331090002 HH PPS REVENUE DEBIT

## 2018-08-28 PROCEDURE — 3331090001 HH PPS REVENUE CREDIT

## 2018-08-28 PROCEDURE — 3331090002 HH PPS REVENUE DEBIT

## 2018-08-29 ENCOUNTER — HOME CARE VISIT (OUTPATIENT)
Dept: HOME HEALTH SERVICES | Facility: HOME HEALTH | Age: 33
End: 2018-08-29
Payer: MEDICARE

## 2018-08-29 PROCEDURE — 3331090002 HH PPS REVENUE DEBIT

## 2018-08-29 PROCEDURE — 3331090003 HH PPS REVENUE ADJ

## 2018-08-29 PROCEDURE — 3331090001 HH PPS REVENUE CREDIT

## 2018-09-04 DIAGNOSIS — F41.9 ANXIETY: ICD-10-CM

## 2018-09-04 RX ORDER — ALPRAZOLAM 0.25 MG/1
TABLET ORAL
Qty: 90 TAB | Refills: 0 | Status: SHIPPED | OUTPATIENT
Start: 2018-09-04 | End: 2018-12-01 | Stop reason: SDUPTHER

## 2018-09-10 ENCOUNTER — PATIENT OUTREACH (OUTPATIENT)
Dept: FAMILY MEDICINE CLINIC | Age: 33
End: 2018-09-10

## 2018-09-10 NOTE — PROGRESS NOTES
ED Discharge Follow-Up Date/Time:     9/10/2018 4:16 PM 
 
Patient presented to [facility]  ED on 18 and was diagnosed with Cellulitis. Top Challenges reviewed with the provider Patient using home health for groin wound care. Method of communication with provider :face to face Nurse Navigator(NN) contacted the  parent  by telephone to perform post ED discharge assessment. Verified name and  with parent as identifiers. Provided introduction to self, and explanation of the Nurse Navigator role. Parent reported assessment: States that her daughter is doing much better. She says that she is taking her antibiotic as prescribed and will follow up in the office on 18. Medication(s):  
New Medications at Discharge: Keflex 500 mg Four times a day Changed Medications at Discharge: None Discontinued Medications at Discharge: None There were no barriers to obtaining medications identified at this time. Reviewed discharge instructions and red flags with  family who voiced understanding. Family given an opportunity to ask questions and does not have any further questions or concerns at this time. The family agrees to contact the PCP office for questions related to their healthcare. Patient reminded that there are physicians on call 24 hours a day / 7 days a week (M-F 5pm to 8am and from Friday 5pm until Monday 8am for the weekend) should the patient have questions or concerns. NN provided contact information for future reference. Offered follow up appointment with PCP: yes [facility] follow up appointment(s):  
Future Appointments Date Time Provider Davon Robins 2018 10:30 AM Jewels Feldman MD TERESA Gardner Non-[facility] follow up appointment(s):  ([facility]) [email or web address]:  information provided as a resource  
 www. [employer] 9 AM- 9 PM.   Phone [phone] Goals  Cellulitis (pt-stated) 9/10/18 Patients mother states that her daughter will continue to take all of her medications as prescribed. NN/TCP 
 
9/10/18 Patient will continue to work with home health for wound care and other services.  NN/TCP

## 2018-09-12 ENCOUNTER — OFFICE VISIT (OUTPATIENT)
Dept: FAMILY MEDICINE CLINIC | Age: 33
End: 2018-09-12

## 2018-09-12 VITALS
TEMPERATURE: 97.9 F | HEIGHT: 55 IN | BODY MASS INDEX: 67.81 KG/M2 | OXYGEN SATURATION: 98 % | RESPIRATION RATE: 22 BRPM | HEART RATE: 111 BPM | WEIGHT: 293 LBS

## 2018-09-12 DIAGNOSIS — F71 MR (MENTAL RETARDATION), MODERATE: ICD-10-CM

## 2018-09-12 DIAGNOSIS — L03.115 CELLULITIS OF RIGHT LOWER EXTREMITY: Primary | ICD-10-CM

## 2018-09-12 DIAGNOSIS — Z23 ENCOUNTER FOR IMMUNIZATION: ICD-10-CM

## 2018-09-12 RX ORDER — DOXYCYCLINE 100 MG/1
100 TABLET ORAL DAILY
Qty: 30 TAB | Refills: 0 | Status: SHIPPED | OUTPATIENT
Start: 2018-09-12 | End: 2018-10-09 | Stop reason: SDUPTHER

## 2018-09-12 NOTE — PATIENT INSTRUCTIONS
Finish the keflex. Take Doxycycline with food and a big glass of water.   Avoid the sun while on Doxycycline (either cover up or wear SPF 15 or above sun block)      Take doxycycline until redness resolves

## 2018-09-12 NOTE — PROGRESS NOTES
Chief Complaint   Patient presents with   [facility] Follow Up     ER follow up on Cellulitis, [address] [facility] 9/7/2018    Immunization/Injection     Influenza Vaccine per verbal order from MD     1. Have you been to the ER, urgent care clinic since your last visit? Hospitalized since your last visit? Yes When: 9/7/2018 [facility]    2. Have you seen or consulted any other health care providers outside of the [address] [place] [address] since your last visit? Include any pap smears or colon screening. No     Reviewed record in preparation for visit and have obtained necessary documentation.     VIS given to patient

## 2018-09-12 NOTE — MR AVS SNAPSHOT
[address] 
[phone] Patient: Venus Sheppard MRN: [id number] OEE:[birth year] Visit Information Date & Time Provider Department Dept. Phone Encounter #  
 9/12/2018 10:30 AM Dominik Dale MD [address] [facility] [phone] [id number] Upcoming Health Maintenance Date Due Influenza Age 5 to Adult 10/6/2018* EYE EXAM RETINAL OR DILATED Q1 6/14/2019* HEMOGLOBIN A1C Q6M 11/1/2018 MICROALBUMIN Q1 11/14/2018 LIPID PANEL Q1 11/14/2018 FOOT EXAM Q1 3/13/2019 MEDICARE YEARLY EXAM 6/15/2019 DTaP/Tdap/Td series (3 - Td) 9/7/2028 *Topic was postponed. The date shown is not the original due date. Allergies as of 9/12/2018  Review Complete On: 9/12/2018 By: Gurpreet Sahu LPN Severity Noted Reaction Type Reactions Bactrim [Sulfamethoprim Ds]  11/09/2015    Other (comments) Nausea, abdominal pain and cramping Byetta [Exenatide]  10/21/2010    Rash Current Immunizations  Reviewed on 9/12/2018 Name Date Influenza Vaccine 10/20/2015, 10/8/2013 Influenza Vaccine (Quad) PF  Incomplete, 8/29/2017, 9/8/2016 Influenza Vaccine PF 9/14/2012 Pneumococcal Conjugate (PCV-13) 5/5/2015 Pneumococcal Vaccine (Unspecified Type) 2/2/2011 TD Vaccine 12/2/2010 Tdap 9/7/2018 Reviewed by Gurpreet Sahu LPN on 2/57/9150 at 92:00 AM  
You Were Diagnosed With   
  
 Codes Comments Encounter for immunization    -  Primary ICD-10-CM: W77 ICD-9-CM: V03.89 Cellulitis of right lower extremity     ICD-10-CM: L03.115 ICD-9-CM: 102. 6 Vitals Pulse Temp Resp Height(growth percentile) Weight(growth percentile) SpO2  
 (!) 111 97.9 °F (36.6 °C) (Oral) 22 4' 7\" (1.397 m) 305 lb (138.3 kg) 98% BMI OB Status Smoking Status 70.89 kg/m2 Unknown Never Smoker Vitals History BMI and BSA Data Body Mass Index Body Surface Area  
 70.89 kg/m 2 2.32 m 2 Preferred Pharmacy Pharmacy Name Phone [id number] [facility] [phone] Your Updated Medication List  
  
   
This list is accurate as of 9/12/18 11:25 AM.  Always use your most recent med list.  
  
  
  
  
 Adhesive Bandage 6 X 8 \" Bndg  
1 Each by Apply Externally route daily. Mepilex Transfers 6x8 inches. T14.8, 2x2cm open area with moderate drainage 1mm deep. 1 box. albuterol 90 mcg/actuation inhaler Commonly known as:  PROVENTIL HFA, VENTOLIN HFA, PROAIR HFA Take 1 Puff by inhalation every four (4) hours as needed for Wheezing. Dispense 8g inhaler. ALPRAZolam 0.25 mg tablet Commonly known as:  XANAX  
TAKE 1 TABLET BY MOUTH THREE TIMES DAILY AS NEEDED FOR ANXIETY  
  
 ammonium lactate 12 % topical cream  
Commonly known as:  AMLACTIN  
APPLY TO THE AFFECTED AREA AND RUB IN WELL TWICE DAILY  
  
 doxycycline 100 mg tablet Commonly known as:  ADOXA Take 1 Tab by mouth daily for 30 days. fenofibrate nanocrystallized 145 mg tablet Commonly known as:  TRICOR  
TAKE 1 TABLET BY MOUTH DAILY  
  
 fluticasone 50 mcg/actuation nasal spray Commonly known as:  Florencio Merles 2 Sprays by Both Nostrils route daily. * furosemide 40 mg tablet Commonly known as:  LASIX Take 40 mg by mouth daily. * furosemide 20 mg tablet Commonly known as:  LASIX TAKE 1 TABLET BY MOUTH EVERY DAY  
  
 hydrOXYzine pamoate 25 mg capsule Commonly known as:  VISTARIL  
TAKE 1 CAPSULE BY MOUTH THREE TIMES DAILY AS NEEDED FOR ITCHING  
  
 insulin aspart U-100 100 unit/mL Inpn Commonly known as:  NOVOLOG  
10 Units by SubCUTAneous route Before breakfast, lunch, dinner and at bedtime. Sliding Scale  
  
 insulin glargine 100 unit/mL (3 mL) Inpn Commonly known as:  LANTUSBASAGLAR  
by SubCUTAneous route two (2) times a day.  80mg am and 80mg pm.  
  
 mupirocin 2 % ointment Commonly known as:  BACTROBAN  
APPLY TO AFFECTED AREA DAILY AS DIRECTED  
  
 mupirocin calcium 2 % topical cream  
Commonly known as:  BACTROBAN  
APPLY EXTERNALLY TO THE AFFECTED AREA EVERY DAY AS DIRECTED  
  
 naftifine 1 % topical cream  
Commonly known as:  NAFTIN  
APPLY EXTERNALLY TO THE AFFECTED AREA EVERY DAY FOR 2 WEEKS OR AS DIRECTED  
  
 nystatin-triamcinolone topical cream  
Commonly known as:  MYCOLOG II  
APPLY EXTERNALLY TO THE AFFECTED AREA TWICE DAILY pantoprazole 40 mg tablet Commonly known as:  PROTONIX Take 1 Tab by mouth daily. risperiDONE 1 mg tablet Commonly known as:  RisperDAL Take 2 Tabs by mouth two (2) times a day. Indications: psychosis  
  
 silver sulfADIAZINE 1 % topical cream  
Commonly known as:  SILVADENE Apply  to affected area daily. Apply daily as directed * Notice: This list has 2 medication(s) that are the same as other medications prescribed for you. Read the directions carefully, and ask your doctor or other care provider to review them with you. Prescriptions Sent to Pharmacy Refills  
 doxycycline (ADOXA) 100 mg tablet 0 Sig: Take 1 Tab by mouth daily for 30 days. Class: Normal  
 Pharmacy: [address] [place] [address] Ph #: [phone] Route: Oral  
  
We Performed the Following INFLUENZA VIRUS VAC QUAD,SPLIT,PRESV FREE SYRINGE IM [id number] CPT(R)] NH IMMUNIZ ADMIN,1 SINGLE/COMB VAC/TOXOID [id number] CPT(R)] Patient Instructions Finish the keflex. Take Doxycycline with food and a big glass of water. Avoid the sun while on Doxycycline (either cover up or wear SPF 15 or above sun block) Take doxycycline until redness resolves Introducing [facility]! [facility] introduces [employer] patient portal. Now you can access parts of your medical record, email your doctor's office, and request medication refills online. 1. In your internet browser, go to [email or web address]t 2. Click on the First Time User? Click Here link in the Sign In box. You will see the New Member Sign Up page. 3. Enter your [employer] Access Code exactly as it appears below. You will not need to use this code after youve completed the sign-up process. If you do not sign up before the expiration date, you must request a new code. · [employer] Access Code: [email or web address] Expires: 9/12/2018  2:52 PM 
 
4. Enter the last four digits of your Social Security Number (xxxx) and Date of Birth (mm/dd/yyyy) as indicated and click Submit. You will be taken to the next sign-up page. 5. Create a [employer] ID. This will be your [employer] login ID and cannot be changed, so think of one that is secure and easy to remember. 6. Create a [employer] password. You can change your password at any time. 7. Enter your Password Reset Question and Answer. This can be used at a later time if you forget your password. 8. Enter your e-mail address. You will receive e-mail notification when new information is available in [address] 9. Click Sign Up. You can now view and download portions of your medical record. 10. Click the Download Summary menu link to download a portable copy of your medical information. If you have questions, please visit the Frequently Asked Questions section of the [employer] website. Remember, [employer] is NOT to be used for urgent needs. For medical emergencies, dial 911. Now available from your iPhone and Android! Please provide this summary of care documentation to your next provider. Your primary care clinician is listed as Cristina Echeverria. If you have any questions after today's visit, please call [phone].

## 2018-10-01 ENCOUNTER — OFFICE VISIT (OUTPATIENT)
Dept: FAMILY MEDICINE CLINIC | Age: 33
End: 2018-10-01

## 2018-10-01 VITALS
WEIGHT: 293 LBS | HEART RATE: 98 BPM | HEIGHT: 55 IN | BODY MASS INDEX: 67.81 KG/M2 | TEMPERATURE: 97.3 F | OXYGEN SATURATION: 95 % | RESPIRATION RATE: 20 BRPM

## 2018-10-01 DIAGNOSIS — K21.9 GASTROESOPHAGEAL REFLUX DISEASE WITHOUT ESOPHAGITIS: ICD-10-CM

## 2018-10-01 DIAGNOSIS — B37.2 CANDIDAL SKIN INFECTION: Primary | ICD-10-CM

## 2018-10-01 DIAGNOSIS — E66.01 OBESITY, MORBID (HCC): ICD-10-CM

## 2018-10-01 RX ORDER — NYSTATIN AND TRIAMCINOLONE ACETONIDE 100000; 1 [USP'U]/G; MG/G
CREAM TOPICAL
Qty: 240 G | Refills: 0 | Status: SHIPPED | OUTPATIENT
Start: 2018-10-01 | End: 2018-11-19 | Stop reason: SDUPTHER

## 2018-10-01 RX ORDER — PANTOPRAZOLE SODIUM 40 MG/1
40 TABLET, DELAYED RELEASE ORAL DAILY
Qty: 30 TAB | Refills: 11 | Status: SHIPPED | OUTPATIENT
Start: 2018-10-01 | End: 2019-04-11 | Stop reason: SDUPTHER

## 2018-10-01 NOTE — MR AVS SNAPSHOT
[address] 
[phone] Patient: Cullen Hill MRN: [id number] QOX:[id number] Visit Information Date & Time Provider Department Dept. Phone Encounter #  
 10/1/2018 10:15 AM Blanca Mendoza, Marty Nieves Higginbotham [phone] [id number] Follow-up Instructions Return if symptoms worsen or fail to improve. Upcoming Health Maintenance Date Due HEMOGLOBIN A1C Q6M 11/1/2018 EYE EXAM RETINAL OR DILATED Q1 6/14/2019* MICROALBUMIN Q1 11/14/2018 LIPID PANEL Q1 11/14/2018 FOOT EXAM Q1 3/13/2019 MEDICARE YEARLY EXAM 6/15/2019 DTaP/Tdap/Td series (3 - Td) 9/7/2028 *Topic was postponed. The date shown is not the original due date. Allergies as of 10/1/2018  Review Complete On: 10/1/2018 By: Tio Epp, LPN Severity Noted Reaction Type Reactions Bactrim [Sulfamethoprim Ds]  11/09/2015    Other (comments) Nausea, abdominal pain and cramping Byetta [Exenatide]  10/21/2010    Rash Current Immunizations  Reviewed on 9/12/2018 Name Date Influenza Vaccine 10/20/2015, 10/8/2013 Influenza Vaccine (Quad) PF 9/12/2018, 8/29/2017, 9/8/2016 Influenza Vaccine PF 9/14/2012 Pneumococcal Conjugate (PCV-13) 5/5/2015 Pneumococcal Vaccine (Unspecified Type) 2/2/2011 TD Vaccine 12/2/2010 Tdap 9/7/2018 Not reviewed this visit You Were Diagnosed With   
  
 Codes Comments Candidal skin infection    -  Primary ICD-10-CM: B37.2 ICD-9-CM: 112.3 Gastroesophageal reflux disease without esophagitis     ICD-10-CM: K21.9 ICD-9-CM: 530.81 Obesity, morbid ([facility] [address]     ICD-10-CM: E66.01 
ICD-9-CM: 278.01 Vitals Pulse Temp Resp Height(growth percentile) Weight(growth percentile) SpO2  
 98 97.3 °F (36.3 °C) (Axillary) 20 4' 7\" (1.397 m) 306 lb 9.6 oz (139.1 kg) 95% BMI OB Status Smoking Status 71.26 kg/m2 Unknown Never Smoker Vitals History BMI and BSA Data Body Mass Index Body Surface Area  
 71.26 kg/m 2 2.32 m 2 Preferred Pharmacy Pharmacy Name Phone [id number] [place], [address] [facility] [phone] Your Updated Medication List  
  
   
This list is accurate as of 10/1/18 10:56 AM.  Always use your most recent med list.  
  
  
  
  
 Adhesive Bandage 6 X 8 \" Bndg  
1 Each by Apply Externally route daily. Mepilex Transfers 6x8 inches. T14.8, 2x2cm open area with moderate drainage 1mm deep. 1 box. albuterol 90 mcg/actuation inhaler Commonly known as:  PROVENTIL HFA, VENTOLIN HFA, PROAIR HFA Take 1 Puff by inhalation every four (4) hours as needed for Wheezing. Dispense 8g inhaler. ALPRAZolam 0.25 mg tablet Commonly known as:  XANAX  
TAKE 1 TABLET BY MOUTH THREE TIMES DAILY AS NEEDED FOR ANXIETY  
  
 ammonium lactate 12 % topical cream  
Commonly known as:  AMLACTIN  
APPLY TO THE AFFECTED AREA AND RUB IN WELL TWICE DAILY  
  
 doxycycline 100 mg tablet Commonly known as:  ADOXA Take 1 Tab by mouth daily for 30 days. fenofibrate nanocrystallized 145 mg tablet Commonly known as:  TRICOR  
TAKE 1 TABLET BY MOUTH DAILY  
  
 fluticasone 50 mcg/actuation nasal spray Commonly known as:  [place] Shall 2 Sprays by Both Nostrils route daily. * furosemide 40 mg tablet Commonly known as:  LASIX Take 40 mg by mouth daily. * furosemide 20 mg tablet Commonly known as:  LASIX TAKE 1 TABLET BY MOUTH EVERY DAY  
  
 hydrOXYzine pamoate 25 mg capsule Commonly known as:  VISTARIL  
TAKE 1 CAPSULE BY MOUTH THREE TIMES DAILY AS NEEDED FOR ITCHING  
  
 insulin aspart U-100 100 unit/mL Inpn Commonly known as:  NOVOLOG  
10 Units by SubCUTAneous route Before breakfast, lunch, dinner and at bedtime. Sliding Scale  
  
 insulin glargine 100 unit/mL (3 mL) Inpn Commonly known as:  LANRICKEYBASAGLAR  
by SubCUTAneous route two (2) times a day. 80mg am and 80mg pm.  
  
 mupirocin 2 % ointment Commonly known as:  BACTROBAN  
APPLY TO AFFECTED AREA DAILY AS DIRECTED  
  
 mupirocin calcium 2 % topical cream  
Commonly known as:  BACTROBAN  
APPLY EXTERNALLY TO THE AFFECTED AREA EVERY DAY AS DIRECTED  
  
 naftifine 1 % topical cream  
Commonly known as:  NAFTIN  
APPLY EXTERNALLY TO THE AFFECTED AREA EVERY DAY FOR 2 WEEKS OR AS DIRECTED  
  
 nystatin-triamcinolone topical cream  
Commonly known as:  MYCOLOG II  
APPLY EXTERNALLY TO THE AFFECTED AREA TWICE DAILY pantoprazole 40 mg tablet Commonly known as:  PROTONIX Take 1 Tab by mouth daily. risperiDONE 1 mg tablet Commonly known as:  RisperDAL Take 2 Tabs by mouth two (2) times a day. Indications: psychosis  
  
 silver sulfADIAZINE 1 % topical cream  
Commonly known as:  SILVADENE Apply  to affected area daily. Apply daily as directed * Notice: This list has 2 medication(s) that are the same as other medications prescribed for you. Read the directions carefully, and ask your doctor or other care provider to review them with you. Prescriptions Sent to Pharmacy Refills  
 nystatin-triamcinolone (MYCOLOG II) topical cream 0 Sig: APPLY EXTERNALLY TO THE AFFECTED AREA TWICE DAILY Class: Normal  
 Pharmacy: [address] [place] [address] Ph #: [phone]  
 pantoprazole (PROTONIX) 40 mg tablet 11 Sig: Take 1 Tab by mouth daily. Class: Normal  
 Pharmacy: [address] [place] [address] Ph #: [phone] Route: Oral  
  
Follow-up Instructions Return if symptoms worsen or fail to improve. Introducing [facility]!    
 [facility] introduces [facility] patient portal. Now you can access parts of your medical record, email your doctor's office, and request medication refills online. 1. In your internet browser, go to [email or web address] 2. Click on the First Time User? Click Here link in the Sign In box. You will see the New Member Sign Up page. 3. Enter your [employer] Access Code exactly as it appears below. You will not need to use this code after youve completed the sign-up process. If you do not sign up before the expiration date, you must request a new code. · [employer] Access Code: [email or web address] Expires: 12/30/2018 10:56 AM 
 
4. Enter the last four digits of your Social Security Number (xxxx) and Date of Birth (mm/dd/yyyy) as indicated and click Submit. You will be taken to the next sign-up page. 5. Create a [employer] ID. This will be your [employer] login ID and cannot be changed, so think of one that is secure and easy to remember. 6. Create a [employer] password. You can change your password at any time. 7. Enter your Password Reset Question and Answer. This can be used at a later time if you forget your password. 8. Enter your e-mail address. You will receive e-mail notification when new information is available in [address] 9. Click Sign Up. You can now view and download portions of your medical record. 10. Click the Download Summary menu link to download a portable copy of your medical information. If you have questions, please visit the Frequently Asked Questions section of the [employer] website. Remember, [employer] is NOT to be used for urgent needs. For medical emergencies, dial 911. Now available from your iPhone and Android! Please provide this summary of care documentation to your next provider. Your primary care clinician is listed as Sanjuanita Salamanca. If you have any questions after today's visit, please call [phone].

## 2018-10-01 NOTE — PROGRESS NOTES
Identified Patient with two Patient identifiers (Name and ). Two Patient Identifiers confirmed. Reviewed record in preparation for visit and have obtained necessary documentation. Chief Complaint   Patient presents with    Skin Problem     Follow up Cellulitis and area with skin break down     Medication Refill       Visit Vitals    Pulse 98    Temp 97.3 °F (36.3 °C) (Axillary)    Resp 20    Ht 4' 7\" (1.397 m)    Wt 306 lb 9.6 oz (139.1 kg)    SpO2 95%    BMI 71.26 kg/m2       1. Have you been to the ER, urgent care clinic since your last visit? Hospitalized since your last visit? No    2. Have you seen or consulted any other health care providers outside of the [address] [place] [address] since your last visit? Include any pap smears or colon screening.  No

## 2018-10-01 NOTE — PROGRESS NOTES
History of Present Illness:     Chief Complaint   Patient presents with    Skin Problem     Follow up Cellulitis and area with skin break down     Medication Refill     Pt is a 35y.o. year old female    Presents to clinic for follow up of cellulitis of right leg. Completed course of Keflex. Still taking Doxy once daily. Area seems to be healing well. Couple of areas on inner thigh skin irritation  No open areas  Needs refill on Nystatin cream     GERD  Tolerating Protonix well  Needs med refill    Past Medical History:   Diagnosis Date    Anxiety disorder     Severe    Congestive heart failure, unspecified     Diabetes ([facility] [address]     Hypertension     Ill-defined condition     lymphedema    Lymph edema     Obesity     Prader - Willi syndrome     Sleep apnea     cpap         Current Outpatient Prescriptions on File Prior to Visit   Medication Sig Dispense Refill    doxycycline (ADOXA) 100 mg tablet Take 1 Tab by mouth daily for 30 days. 30 Tab 0    ALPRAZolam (XANAX) 0.25 mg tablet TAKE 1 TABLET BY MOUTH THREE TIMES DAILY AS NEEDED FOR ANXIETY 90 Tab 0    ammonium lactate (AMLACTIN) 12 % topical cream APPLY TO THE AFFECTED AREA AND RUB IN WELL TWICE DAILY 280 g 0    Adhesive Bandage 6 X 8 \" bndg 1 Each by Apply Externally route daily. Mepilex Transfers 6x8 inches. T14.8, 2x2cm open area with moderate drainage 1mm deep. 1 box. 1 Each 2    mupirocin (BACTROBAN) 2 % ointment APPLY TO AFFECTED AREA DAILY AS DIRECTED 22 g 1    mupirocin calcium (BACTROBAN) 2 % topical cream APPLY EXTERNALLY TO THE AFFECTED AREA EVERY DAY AS DIRECTED 30 g 1    naftifine (NAFTIN) 1 % topical cream APPLY EXTERNALLY TO THE AFFECTED AREA EVERY DAY FOR 2 WEEKS OR AS DIRECTED 60 g 0    albuterol (PROVENTIL HFA, VENTOLIN HFA, PROAIR HFA) 90 mcg/actuation inhaler Take 1 Puff by inhalation every four (4) hours as needed for Wheezing. Dispense 8g inhaler.  1 Inhaler 0    furosemide (LASIX) 20 mg tablet TAKE 1 TABLET BY MOUTH EVERY DAY 90 Tab 0    hydrOXYzine (VISTARIL) 25 mg capsule TAKE 1 CAPSULE BY MOUTH THREE TIMES DAILY AS NEEDED FOR ITCHING 30 Cap 0    fluticasone (FLONASE) 50 mcg/actuation nasal spray 2 Sprays by Both Nostrils route daily. 1 Bottle 6    fenofibrate nanocrystallized (TRICOR) 145 mg tablet TAKE 1 TABLET BY MOUTH DAILY 90 Tab 0    insulin aspart U-100 (NOVOLOG) 100 unit/mL inpn 10 Units by SubCUTAneous route Before breakfast, lunch, dinner and at bedtime. Sliding Scale      risperiDONE (RISPERDAL) 1 mg tablet Take 2 Tabs by mouth two (2) times a day. Indications: psychosis (Patient taking differently: Take 2 mg by mouth two (2) times a day. Indications: psychosis, states she takes 2 mg) 360 Tab 3    furosemide (LASIX) 40 mg tablet Take 40 mg by mouth daily.  silver sulfADIAZINE (SILVADENE) 1 % topical cream Apply  to affected area daily. Apply daily as directed 85 g 1    insulin glargine (LANTUS,BASAGLAR) 100 unit/mL (3 mL) inpn by SubCUTAneous route two (2) times a day. 80mg am and 80mg pm.       No current facility-administered medications on file prior to visit. Allergies: Allergies   Allergen Reactions    Bactrim [Sulfamethoprim Ds] Other (comments)     Nausea, abdominal pain and cramping      Byetta [Exenatide] Rash         Review of Systems:  No fever, chills, sweats      Objective:     Vitals:    10/01/18 1012   Pulse: 98   Resp: 20   Temp: 97.3 °F (36.3 °C)   TempSrc: Axillary   SpO2: 95%   Weight: 306 lb 9.6 oz (139.1 kg)   Height: 4' 7\" (1.397 m)       Physical Exam:  General appearance - alert, well appearing, and in no distress and morbidly obese  Skin - RLE skin appears minimally erythematous without induration. 2 areas of erythema on inner thigh, area tender to touch. Recent Labs:  No results found for this or any previous visit (from the past 12 hour(s)). Assessment and Plan:   Pt is a 35y.o. year old female,      ICD-10-CM ICD-9-CM    1.  Candidal skin infection B37.2 112.3 nystatin-triamcinolone (MYCOLOG II) topical cream   2. Gastroesophageal reflux disease without esophagitis K21.9 530.81 pantoprazole (PROTONIX) 40 mg tablet   3. Obesity, morbid ([address] E66.01 278.01      Refilled meds    Continue wound care at home  Can consider getting wound care nursing back for home visits if needed    Complete abx course as instructed    Follow up PRN    Blanca Mendoza MD      I have discussed the diagnosis with the patient and the intended plan as seen in the above orders. The patient has received an after-visit summary and questions were answered concerning future plans.

## 2018-10-09 DIAGNOSIS — L03.115 CELLULITIS OF RIGHT LOWER EXTREMITY: ICD-10-CM

## 2018-10-09 RX ORDER — DOXYCYCLINE 100 MG/1
TABLET ORAL
Qty: 30 TAB | Refills: 0 | Status: SHIPPED | OUTPATIENT
Start: 2018-10-09 | End: 2021-09-15 | Stop reason: ALTCHOICE

## 2018-10-21 RX ORDER — MUPIROCIN 20 MG/G
OINTMENT TOPICAL
Qty: 22 G | Refills: 1 | Status: SHIPPED | OUTPATIENT
Start: 2018-10-21 | End: 2019-01-10 | Stop reason: SDUPTHER

## 2018-10-21 RX ORDER — AMMONIUM LACTATE 12 G/100G
CREAM TOPICAL
Qty: 280 G | Refills: 0 | Status: SHIPPED | OUTPATIENT
Start: 2018-10-21 | End: 2018-11-19 | Stop reason: SDUPTHER

## 2018-10-22 RX ORDER — MUPIROCIN 20 MG/G
OINTMENT TOPICAL
Qty: 22 G | Refills: 1 | Status: SHIPPED | OUTPATIENT
Start: 2018-10-22 | End: 2019-01-08 | Stop reason: SDUPTHER

## 2018-11-15 LAB — LDL-C, EXTERNAL: 94

## 2018-11-19 DIAGNOSIS — B37.2 CANDIDAL SKIN INFECTION: ICD-10-CM

## 2018-11-19 RX ORDER — NYSTATIN AND TRIAMCINOLONE ACETONIDE 100000; 1 [USP'U]/G; MG/G
CREAM TOPICAL
Qty: 240 G | Refills: 0 | Status: SHIPPED | OUTPATIENT
Start: 2018-11-19 | End: 2018-12-01 | Stop reason: SDUPTHER

## 2018-11-19 RX ORDER — AMMONIUM LACTATE 12 G/100G
CREAM TOPICAL
Qty: 280 G | Refills: 0 | Status: SHIPPED | OUTPATIENT
Start: 2018-11-19 | End: 2019-02-08 | Stop reason: SDUPTHER

## 2018-12-01 DIAGNOSIS — B37.2 CANDIDAL SKIN INFECTION: ICD-10-CM

## 2018-12-01 DIAGNOSIS — F41.9 ANXIETY: ICD-10-CM

## 2018-12-02 RX ORDER — NYSTATIN AND TRIAMCINOLONE ACETONIDE 100000; 1 [USP'U]/G; MG/G
CREAM TOPICAL
Qty: 240 G | Refills: 0 | Status: SHIPPED | OUTPATIENT
Start: 2018-12-02 | End: 2019-01-21 | Stop reason: SDUPTHER

## 2018-12-02 RX ORDER — ALPRAZOLAM 0.25 MG/1
TABLET ORAL
Qty: 90 TAB | Refills: 0 | Status: SHIPPED | OUTPATIENT
Start: 2018-12-02 | End: 2019-02-12 | Stop reason: SDUPTHER

## 2018-12-04 LAB — CREATININE, EXTERNAL: 1.18

## 2019-01-10 RX ORDER — MUPIROCIN 20 MG/G
OINTMENT TOPICAL
Qty: 22 G | Refills: 1 | Status: SHIPPED | OUTPATIENT
Start: 2019-01-10 | End: 2019-06-04 | Stop reason: SDUPTHER

## 2019-01-21 DIAGNOSIS — B37.2 CANDIDAL SKIN INFECTION: ICD-10-CM

## 2019-01-21 RX ORDER — NYSTATIN AND TRIAMCINOLONE ACETONIDE 100000; 1 [USP'U]/G; MG/G
CREAM TOPICAL
Qty: 240 G | Refills: 0 | Status: SHIPPED | OUTPATIENT
Start: 2019-01-21 | End: 2019-03-06 | Stop reason: SDUPTHER

## 2019-01-22 ENCOUNTER — TELEPHONE (OUTPATIENT)
Dept: FAMILY MEDICINE CLINIC | Age: 34
End: 2019-01-22

## 2019-01-22 RX ORDER — NYSTATIN 100000 [USP'U]/G
POWDER TOPICAL 4 TIMES DAILY
Qty: 240 G | Refills: 3 | Status: SHIPPED | OUTPATIENT
Start: 2019-01-22 | End: 2019-02-28 | Stop reason: SDUPTHER

## 2019-01-22 NOTE — TELEPHONE ENCOUNTER
----- Message from Juana Conde sent at 1/22/2019  6:49 AM EST -----  Regarding: Dr Cannon/telephone  Acute Visit-No Appt Available, Pt's mom Betty Mcallister would like appt today or tomorrow, for skin break down the pt is having, mom only wants it with Dr Felipe Haney, said she knows the pt condition, please call mom at [phone].
Attempted to call patient Guardian. Guardian unavailable, LVM to call the office.
Dr. Mynor Schafer Telephone   Received: Today   Message Contents   Dellis Ink      Mrs. Abigail Siddiqui, pt mother, requesting a call back from a missed call. Mrs. Chicho Simeon best contact number is [phone].
Verified patient with Marcela Merida (Mother) by 2 identifiers. Mother states patient needs all new prescription due to the passing of Dr Christine Hilliard. Mother states an appointment have been made but would like to see Dr Rae Sheffield sooner. Rescheduled patient for February 4 at 10:15 AM with Dr Rae Sheffield. Mother states that patient needs a refill of nystatin powder also. Sent refill request to Dr Rae Sheffield. Mother verbalized understanding of appointment date and time.
no

## 2019-02-08 ENCOUNTER — OFFICE VISIT (OUTPATIENT)
Dept: FAMILY MEDICINE CLINIC | Age: 34
End: 2019-02-08

## 2019-02-08 VITALS
RESPIRATION RATE: 20 BRPM | TEMPERATURE: 98.2 F | BODY MASS INDEX: 67.81 KG/M2 | OXYGEN SATURATION: 95 % | WEIGHT: 293 LBS | HEIGHT: 55 IN | HEART RATE: 108 BPM

## 2019-02-08 DIAGNOSIS — E11.9 TYPE 2 DIABETES MELLITUS WITHOUT COMPLICATION, UNSPECIFIED WHETHER LONG TERM INSULIN USE (HCC): ICD-10-CM

## 2019-02-08 DIAGNOSIS — R23.8 SKIN IRRITATION: ICD-10-CM

## 2019-02-08 DIAGNOSIS — E66.01 OBESITY, MORBID (HCC): ICD-10-CM

## 2019-02-08 DIAGNOSIS — Q87.11 PRADER-WILLI SYNDROME: Primary | ICD-10-CM

## 2019-02-08 DIAGNOSIS — L02.211 CUTANEOUS ABSCESS OF ABDOMINAL WALL: ICD-10-CM

## 2019-02-08 RX ORDER — AMMONIUM LACTATE 12 G/100G
CREAM TOPICAL
Qty: 385 G | Refills: 3 | Status: SHIPPED | OUTPATIENT
Start: 2019-02-08 | End: 2019-07-12 | Stop reason: SDUPTHER

## 2019-02-08 NOTE — PROGRESS NOTES
History of Present Illness: Chief Complaint Patient presents with  Medication Evaluation Risperdone  Wound Check Pt is a 35y.o. year old female Presents to clinic for medication evaluation. Mother has questions about Risperdone Cardiologist wants to stop the medication but mother is reluctant to change Concern is for weight gain over the years Wounds under panus Doing well with current skin care Mother asking for PT/OT referral 
Patient gaining weight and wants he to be more active Past Medical History:  
Diagnosis Date  Anxiety disorder Severe  Congestive heart failure, unspecified  Diabetes ([facility] [address]  Hypertension  Ill-defined condition   
 lymphedema  Lymph edema  Obesity  Prader - Rand Sprinkle syndrome  Sleep apnea   
 cpap Current Outpatient Medications on File Prior to Visit Medication Sig Dispense Refill  nystatin (MYCOSTATIN) powder Apply  to affected area four (4) times daily. 240 g 3  
 nystatin-triamcinolone (MYCOLOG II) topical cream APPLY EXTERNALLY TO THE AFFECTED AREA TWICE DAILY 240 g 0  
 mupirocin (BACTROBAN) 2 % ointment APPLY TO AFFECTED AREA DAILY AS DIRECTED 22 g 1  
 ALPRAZolam (XANAX) 0.25 mg tablet TAKE 1 TABLET BY MOUTH 3 TIMES DAILY AS NEEDED FOR ANXIETY 90 Tab 0  
 pantoprazole (PROTONIX) 40 mg tablet Take 1 Tab by mouth daily. 30 Tab 11  
 insulin aspart U-100 (NOVOLOG) 100 unit/mL inpn 10 Units by SubCUTAneous route Before breakfast, lunch, dinner and at bedtime. Sliding Scale  Adhesive Bandage 6 X 8 \" bndg 1 Each by Apply Externally route daily. Mepilex Transfers 6x8 inches. T14.8, 2x2cm open area with moderate drainage 1mm deep. 1 box. 1 Each 2  
 risperiDONE (RISPERDAL) 1 mg tablet Take 2 Tabs by mouth two (2) times a day. Indications: psychosis (Patient taking differently: Take 2 mg by mouth two (2) times a day.  Indications: psychosis, states she takes 2 mg) 360 Tab 3  
  furosemide (LASIX) 40 mg tablet Take 40 mg by mouth daily.  naftifine (NAFTIN) 1 % topical cream APPLY EXTERNALLY TO THE AFFECTED AREA EVERY DAY FOR 2 WEEKS OR AS DIRECTED 60 g 0  
 albuterol (PROVENTIL HFA, VENTOLIN HFA, PROAIR HFA) 90 mcg/actuation inhaler Take 1 Puff by inhalation every four (4) hours as needed for Wheezing. Dispense 8g inhaler. 1 Inhaler 0  
 silver sulfADIAZINE (SILVADENE) 1 % topical cream Apply  to affected area daily. Apply daily as directed 85 g 1  
 insulin glargine (LANTUS,BASAGLAR) 100 unit/mL (3 mL) inpn by SubCUTAneous route two (2) times a day. 80mg am and 80mg pm.    
 furosemide (LASIX) 20 mg tablet TAKE 1 TABLET BY MOUTH EVERY DAY 90 Tab 0  
 hydrOXYzine (VISTARIL) 25 mg capsule TAKE 1 CAPSULE BY MOUTH THREE TIMES DAILY AS NEEDED FOR ITCHING 30 Cap 0  
 fluticasone (FLONASE) 50 mcg/actuation nasal spray 2 Sprays by Both Nostrils route daily. 1 Bottle 6  
 fenofibrate nanocrystallized (TRICOR) 145 mg tablet TAKE 1 TABLET BY MOUTH DAILY 90 Tab 0  
 doxycycline (ADOXA) 100 mg tablet TAKE 1 TABLET BY MOUTH EVERY DAY 30 Tab 0 No current facility-administered medications on file prior to visit. Allergies: Allergies Allergen Reactions  Bactrim [Sulfamethoprim Ds] Other (comments) Nausea, abdominal pain and cramping  Byetta [Exenatide] Rash Review of Systems: 
Denies fever, chills, sweats Objective:  
 
Vitals:  
 02/08/19 1011 Pulse: (!) 108 Resp: 20 Temp: 98.2 °F (36.8 °C) TempSrc: Oral  
SpO2: 95% Weight: 315 lb 6.4 oz (143.1 kg) Height: 4' 7\" (1.397 m) Physical Exam: 
General appearance - alert, well appearing, and in no distress and overweight Mental status - Calm mood, child like affect Skin - Small, well healing lesion under panus, otherwise, skin good without breakdown Recent Labs: 
No results found for this or any previous visit (from the past 12 hour(s)). Assessment and Plan: Pt is a 35y.o. year old female, 
 
  ICD-10-CM ICD-9-CM 1. Prader-Willi syndrome Q87.1 759.81 REFERRAL TO PHYSICAL THERAPY REFERRAL TO OCCUPATIONAL THERAPY 2. Cutaneous abscess of abdominal wall L02.211 682.2 3. Type 2 diabetes mellitus without complication, unspecified whether long term insulin use (HCC) E11.9 250.00   
4. Skin irritation R23.8 709.9 ammonium lactate (AMLACTIN) 12 % topical cream  
 
Will keep Respiradone at current dose given concerns of mother Will try other interventions to get weight down Referring to PT/OT to help guide activity Refer to nutritionist at diabetes education center Refilled Ammonia cream 
 
Mother to have labs/ records from specialists (Endocrine, Cardiology, Nephrology) sent to me Follow up in 3 months Smtih Llamas MD 
 
 
I have discussed the diagnosis with the patient and the intended plan as seen in the above orders. The patient has received an after-visit summary and questions were answered concerning future plans.

## 2019-02-08 NOTE — PROGRESS NOTES
Chief Complaint Patient presents with  Medication Evaluation Babs 1. Have you been to the ER, urgent care clinic since your last visit? Hospitalized since your last visit? No 
 
2. Have you seen or consulted any other health care providers outside of the [address] [place] [address] since your last visit? Include any pap smears or colon screening. No  
 
Visit Vitals Pulse (!) 108 Temp 98.2 °F (36.8 °C) (Oral) Resp 20 Ht 4' 7\" (1.397 m) Wt 315 lb 6.4 oz (143.1 kg) SpO2 95% BMI 73.31 kg/m² Health Maintenance Due Topic Date Due  
 HEMOGLOBIN A1C Q6M  11/01/2018  MICROALBUMIN Q1  11/14/2018  LIPID PANEL Q1  11/14/2018 Ammonium lactate 12% 385 gm Pump would prefer instead of the tube.

## 2019-02-12 ENCOUNTER — TELEPHONE (OUTPATIENT)
Dept: FAMILY MEDICINE CLINIC | Age: 34
End: 2019-02-12

## 2019-02-12 DIAGNOSIS — F41.9 ANXIETY: ICD-10-CM

## 2019-02-12 RX ORDER — ALPRAZOLAM 0.25 MG/1
TABLET ORAL
Qty: 90 TAB | Refills: 0 | Status: SHIPPED | OUTPATIENT
Start: 2019-02-12 | End: 2019-04-11 | Stop reason: SDUPTHER

## 2019-02-12 NOTE — TELEPHONE ENCOUNTER
Patients mother called stating the patient has an appointment with [facility] on 2/21/19 at 2pm for PT and OT. She stated [facility] needs a written script faxed over from Dr. Bianka Tierney before her appointment. She did not have the fax number.

## 2019-02-22 ENCOUNTER — TELEPHONE (OUTPATIENT)
Dept: FAMILY MEDICINE CLINIC | Age: 34
End: 2019-02-22

## 2019-02-22 NOTE — TELEPHONE ENCOUNTER
Sanjuana Birch with [facility] calling from  [phone]    Need updated medication list to be faxed to [phone]      thanks

## 2019-03-01 RX ORDER — NYSTATIN 100000 [USP'U]/G
POWDER TOPICAL 4 TIMES DAILY
Qty: 240 G | Refills: 3 | Status: SHIPPED | OUTPATIENT
Start: 2019-03-01 | End: 2019-04-11 | Stop reason: SDUPTHER

## 2019-03-04 ENCOUNTER — OFFICE VISIT (OUTPATIENT)
Dept: FAMILY MEDICINE CLINIC | Age: 34
End: 2019-03-04

## 2019-03-04 VITALS
RESPIRATION RATE: 24 BRPM | WEIGHT: 293 LBS | BODY MASS INDEX: 67.81 KG/M2 | HEIGHT: 55 IN | HEART RATE: 98 BPM | TEMPERATURE: 98.6 F | OXYGEN SATURATION: 99 %

## 2019-03-04 DIAGNOSIS — L03.115 CELLULITIS OF RIGHT LOWER EXTREMITY: Primary | ICD-10-CM

## 2019-03-04 RX ORDER — CEPHALEXIN 500 MG/1
500 CAPSULE ORAL 4 TIMES DAILY
Qty: 40 CAP | Refills: 0 | Status: SHIPPED | OUTPATIENT
Start: 2019-03-04 | End: 2019-03-14

## 2019-03-04 NOTE — PROGRESS NOTES
1. Have you been to the ER, urgent care clinic since your last visit? Hospitalized since your last visit? No    2. Have you seen or consulted any other health care providers outside of the [address] [place] [address] since your last visit? Include any pap smears or colon screening. No        Chief Complaint   Patient presents with    Skin Problem     Redness and warmth on right inner thigh. Pulse 98, temperature 98.6 °F (37 °C), temperature source Axillary, resp. rate 24, height 4' 7\" (1.397 m), weight 314 lb 6 oz (142.6 kg), SpO2 99 %.

## 2019-03-04 NOTE — PATIENT INSTRUCTIONS

## 2019-03-06 DIAGNOSIS — B37.2 CANDIDAL SKIN INFECTION: ICD-10-CM

## 2019-03-06 NOTE — TELEPHONE ENCOUNTER
Per call from patient mother, requesting refill on medication. Patient is out    nystatin-triamcinolone (MYCOLOG II) topical cream      she states she received the powder but now need the cream to mix them together.      [phone]        thanks

## 2019-03-07 RX ORDER — NYSTATIN AND TRIAMCINOLONE ACETONIDE 100000; 1 [USP'U]/G; MG/G
CREAM TOPICAL
Qty: 240 G | Refills: 0 | Status: SHIPPED | OUTPATIENT
Start: 2019-03-07 | End: 2019-03-28 | Stop reason: SDUPTHER

## 2019-03-20 DIAGNOSIS — F29 PSYCHOSIS, UNSPECIFIED PSYCHOSIS TYPE (HCC): ICD-10-CM

## 2019-03-20 RX ORDER — RISPERIDONE 1 MG/1
2 TABLET, FILM COATED ORAL 2 TIMES DAILY
Qty: 360 TAB | Refills: 3 | Status: SHIPPED | OUTPATIENT
Start: 2019-03-20 | End: 2019-04-11 | Stop reason: SDUPTHER

## 2019-03-20 NOTE — TELEPHONE ENCOUNTER
Requested Prescriptions     Pending Prescriptions Disp Refills    risperiDONE (RISPERDAL) 1 mg tablet 360 Tab 3     Sig: Take 2 Tabs by mouth two (2) times a day. Indications: psychosis     Mother would like a call back when addressed.

## 2019-03-21 ENCOUNTER — TELEPHONE (OUTPATIENT)
Dept: FAMILY MEDICINE CLINIC | Age: 34
End: 2019-03-21

## 2019-03-21 NOTE — TELEPHONE ENCOUNTER
Verified patient by 2 identifiers with Mother Darin Jenkins. Mother was requesting a refill of Risperidone for patient. Informed Mother Prescription was sent to pharmacy on 3/20/19. Mother verbalized understanding.

## 2019-03-21 NOTE — TELEPHONE ENCOUNTER
----- Message from Clarita Malloy sent at 3/21/2019  7:07 AM EDT -----  Regarding: Davis/telephone  Pts mother Dirk Resendez is requesting for you to call her in regard to the medication Risperidone. Mothers number is [phone].

## 2019-03-28 DIAGNOSIS — B37.2 CANDIDAL SKIN INFECTION: ICD-10-CM

## 2019-03-28 DIAGNOSIS — K21.9 GASTROESOPHAGEAL REFLUX DISEASE WITHOUT ESOPHAGITIS: ICD-10-CM

## 2019-03-28 DIAGNOSIS — F29 PSYCHOSIS, UNSPECIFIED PSYCHOSIS TYPE (HCC): ICD-10-CM

## 2019-03-28 DIAGNOSIS — F41.9 ANXIETY: ICD-10-CM

## 2019-03-28 RX ORDER — NYSTATIN AND TRIAMCINOLONE ACETONIDE 100000; 1 [USP'U]/G; MG/G
CREAM TOPICAL
Qty: 240 G | Refills: 3 | Status: SHIPPED | OUTPATIENT
Start: 2019-03-28 | End: 2019-04-11 | Stop reason: SDUPTHER

## 2019-04-11 ENCOUNTER — TELEPHONE (OUTPATIENT)
Dept: FAMILY MEDICINE CLINIC | Age: 34
End: 2019-04-11

## 2019-04-11 RX ORDER — IMIPRAMINE PAMOATE 75 MG/1
75 CAPSULE ORAL
COMMUNITY
End: 2019-04-11 | Stop reason: SDUPTHER

## 2019-04-11 NOTE — TELEPHONE ENCOUNTER
Patients mother is calling to get all the patients prescriptions switched [facility]. She states they have been having communication issues between our office and [facility] and they would like to switch back to [facility] but all the scripts at [facility] are from Dr. Shine Leo and are not able to be filled. Can you transfer the scripts from Dr. Amrita Dove? Mother would like a call back this morning. She also requested that I CC the supervisor in this message.

## 2019-04-11 NOTE — TELEPHONE ENCOUNTER
Spoke with Adriel Yepez and Mother informed of which medications needed to be addressed immediately. Refill request sent to Dr Dayton Caldwell.

## 2019-04-11 NOTE — TELEPHONE ENCOUNTER
you saw this Patient on 2/8/19 Would you be okay with us confirming the Rx's With mom and then resending them to the new pharmacy.      Thanks

## 2019-04-12 RX ORDER — NYSTATIN AND TRIAMCINOLONE ACETONIDE 100000; 1 [USP'U]/G; MG/G
CREAM TOPICAL
Qty: 240 G | Refills: 3 | Status: SHIPPED | OUTPATIENT
Start: 2019-04-12

## 2019-04-12 RX ORDER — PANTOPRAZOLE SODIUM 40 MG/1
40 TABLET, DELAYED RELEASE ORAL DAILY
Qty: 30 TAB | Refills: 11 | Status: SHIPPED | OUTPATIENT
Start: 2019-04-12 | End: 2019-05-10 | Stop reason: SDUPTHER

## 2019-04-12 RX ORDER — RISPERIDONE 1 MG/1
2 TABLET, FILM COATED ORAL 2 TIMES DAILY
Qty: 360 TAB | Refills: 3 | Status: SHIPPED | OUTPATIENT
Start: 2019-04-12 | End: 2022-06-30

## 2019-04-12 RX ORDER — NYSTATIN 100000 [USP'U]/G
POWDER TOPICAL 4 TIMES DAILY
Qty: 240 G | Refills: 3 | Status: SHIPPED | OUTPATIENT
Start: 2019-04-12 | End: 2019-07-28 | Stop reason: SDUPTHER

## 2019-04-12 RX ORDER — IMIPRAMINE PAMOATE 75 MG/1
75 CAPSULE ORAL
Qty: 270 CAP | Refills: 3 | Status: SHIPPED | OUTPATIENT
Start: 2019-04-12 | End: 2019-04-30 | Stop reason: DRUGHIGH

## 2019-04-12 RX ORDER — ALPRAZOLAM 0.25 MG/1
TABLET ORAL
Qty: 90 TAB | Refills: 0 | Status: SHIPPED | OUTPATIENT
Start: 2019-04-12 | End: 2019-09-13 | Stop reason: SDUPTHER

## 2019-04-30 RX ORDER — IMIPRAMINE HYDROCHLORIDE 25 MG/1
TABLET ORAL
Qty: 90 TAB | Refills: 0 | Status: SHIPPED | OUTPATIENT
Start: 2019-04-30 | End: 2019-04-30 | Stop reason: SDUPTHER

## 2019-04-30 RX ORDER — IMIPRAMINE HYDROCHLORIDE 25 MG/1
TABLET ORAL
Qty: 270 TAB | Refills: 0 | Status: SHIPPED | OUTPATIENT
Start: 2019-04-30 | End: 2019-05-28 | Stop reason: SDUPTHER

## 2019-05-01 ENCOUNTER — TELEPHONE (OUTPATIENT)
Dept: FAMILY MEDICINE CLINIC | Age: 34
End: 2019-05-01

## 2019-05-01 NOTE — TELEPHONE ENCOUNTER
Patient mother Flordia Aase) calling and states there was a bit of a difference when medication was sent in to pharmacy as 75 mg.  She states it need to be 25 mg 3 times a day which is showing in system?    imipramine (TOFRANIL) 25 mg tablet     Call her for more information at [phone]

## 2019-05-01 NOTE — TELEPHONE ENCOUNTER
Verified patient by 2 identifiers with Mother Trevin Barakat. Mother is stating that patient only needs Imipramine 25 mg tab not 75 mg. Informed Mother that patient prescription was resent to pharmacy for Imipramine 25 mg tablet, take 3 times a day. Mother verbalized understanding.

## 2019-05-10 DIAGNOSIS — K21.9 GASTROESOPHAGEAL REFLUX DISEASE WITHOUT ESOPHAGITIS: ICD-10-CM

## 2019-05-10 RX ORDER — PANTOPRAZOLE SODIUM 40 MG/1
TABLET, DELAYED RELEASE ORAL
Qty: 90 TAB | Refills: 11 | Status: SHIPPED | OUTPATIENT
Start: 2019-05-10 | End: 2019-09-20 | Stop reason: SDUPTHER

## 2019-05-28 RX ORDER — IMIPRAMINE HYDROCHLORIDE 25 MG/1
TABLET ORAL
Qty: 270 TAB | Refills: 0 | Status: SHIPPED | OUTPATIENT
Start: 2019-05-28 | End: 2019-09-12 | Stop reason: SDUPTHER

## 2019-05-28 RX ORDER — IMIPRAMINE HYDROCHLORIDE 25 MG/1
TABLET ORAL
Qty: 90 TAB | Refills: 0 | Status: SHIPPED | OUTPATIENT
Start: 2019-05-28 | End: 2019-05-28 | Stop reason: SDUPTHER

## 2019-06-04 ENCOUNTER — OFFICE VISIT (OUTPATIENT)
Dept: FAMILY MEDICINE CLINIC | Age: 34
End: 2019-06-04

## 2019-06-04 VITALS
TEMPERATURE: 97.9 F | HEIGHT: 55 IN | WEIGHT: 293 LBS | OXYGEN SATURATION: 95 % | RESPIRATION RATE: 17 BRPM | HEART RATE: 100 BPM | BODY MASS INDEX: 67.81 KG/M2

## 2019-06-04 DIAGNOSIS — E66.01 OBESITY, MORBID (HCC): ICD-10-CM

## 2019-06-04 DIAGNOSIS — S31.109S OPEN WOUND OF ANTERIOR ABDOMINAL WALL, SEQUELA: Primary | ICD-10-CM

## 2019-06-04 RX ORDER — MUPIROCIN 20 MG/G
OINTMENT TOPICAL
Qty: 22 G | Refills: 2 | Status: SHIPPED | OUTPATIENT
Start: 2019-06-04 | End: 2019-08-10 | Stop reason: SDUPTHER

## 2019-06-04 NOTE — PROGRESS NOTES
History of Present Illness:     Chief Complaint   Patient presents with    Medication Refill    Medication Evaluation     Pt is a 29y.o. year old female    Presents to clinic for medication evaluation. Recurrent wounds under panus  Currently wounds are well healing, nothing active now  Needs refills of Bactroban   Amlactin pump bottle refill coming due soon     Mepilex bandages used for wounds  Needs size of wound and moderate drainage to be specified     Risperidone, imipramine and pantoprazole needed in 90 day supplies    Past Medical History:   Diagnosis Date    Anxiety disorder     Severe    Congestive heart failure, unspecified     Diabetes ([facility]     Hypertension     Ill-defined condition     lymphedema    Lymph edema     Obesity     Prader - Willi syndrome     Sleep apnea     cpap         Current Outpatient Medications on File Prior to Visit   Medication Sig Dispense Refill    imipramine (TOFRANIL) 25 mg tablet TAKE 3 TABLETS BY MOUTH AT BEDTIME 270 Tab 0    pantoprazole (PROTONIX) 40 mg tablet TAKE 1 TABLET BY MOUTH DAILY 90 Tab 11    nystatin (MYCOSTATIN) powder Apply  to affected area four (4) times daily. 240 g 3    nystatin-triamcinolone (MYCOLOG II) topical cream APPLY EXTERNALLY TO THE AFFECTED AREA TWICE DAILY 240 g 3    ALPRAZolam (XANAX) 0.25 mg tablet TAKE 1 TABLET BY MOUTH 3 TIMES A DAY AS NEEDED FOR ANXIETY 90 Tab 0    risperiDONE (RISPERDAL) 1 mg tablet Take 2 Tabs by mouth two (2) times a day. Indications: psychosis 360 Tab 3    ammonium lactate (AMLACTIN) 12 % topical cream APPLY TO THE AFFECTED AREA AND RUB IN WELL TWICE DAILY 385 g 3    doxycycline (ADOXA) 100 mg tablet TAKE 1 TABLET BY MOUTH EVERY DAY 30 Tab 0    insulin aspart U-100 (NOVOLOG) 100 unit/mL inpn 10 Units by SubCUTAneous route Before breakfast, lunch, dinner and at bedtime. Sliding Scale      Adhesive Bandage 6 X 8 \" bndg 1 Each by Apply Externally route daily. Mepilex Transfers 6x8 inches.   T14.8, 2x2cm open area with moderate drainage 1mm deep. 1 box. 1 Each 2    furosemide (LASIX) 40 mg tablet Take 40 mg by mouth daily.  naftifine (NAFTIN) 1 % topical cream APPLY EXTERNALLY TO THE AFFECTED AREA EVERY DAY FOR 2 WEEKS OR AS DIRECTED 60 g 0    albuterol (PROVENTIL HFA, VENTOLIN HFA, PROAIR HFA) 90 mcg/actuation inhaler Take 1 Puff by inhalation every four (4) hours as needed for Wheezing. Dispense 8g inhaler. 1 Inhaler 0    silver sulfADIAZINE (SILVADENE) 1 % topical cream Apply  to affected area daily. Apply daily as directed (Patient taking differently: Apply  to affected area as needed. Apply daily as directed) 85 g 1    insulin glargine (LANTUS,BASAGLAR) 100 unit/mL (3 mL) inpn by SubCUTAneous route two (2) times a day. 80mg am and 80mg pm.      furosemide (LASIX) 20 mg tablet TAKE 1 TABLET BY MOUTH EVERY DAY 90 Tab 0    hydrOXYzine (VISTARIL) 25 mg capsule TAKE 1 CAPSULE BY MOUTH THREE TIMES DAILY AS NEEDED FOR ITCHING 30 Cap 0    fluticasone (FLONASE) 50 mcg/actuation nasal spray 2 Sprays by Both Nostrils route daily. (Patient taking differently: 2 Sprays by Both Nostrils route as needed.) 1 Bottle 6    fenofibrate nanocrystallized (TRICOR) 145 mg tablet TAKE 1 TABLET BY MOUTH DAILY 90 Tab 0     No current facility-administered medications on file prior to visit. Allergies:   Allergies   Allergen Reactions    Bactrim [Sulfamethoprim Ds] Other (comments)     Nausea, abdominal pain and cramping      Byetta [Exenatide] Rash         Review of Systems:  No fever, chills, sweats  No new skin wounds      Objective:     Vitals:    06/04/19 1411   Pulse: 100   Resp: 17   Temp: 97.9 °F (36.6 °C)   TempSrc: Oral   SpO2: 95%   Weight: 311 lb 12.8 oz (141.4 kg)   Height: 4' 7\" (1.397 m)       Physical Exam:  General appearance - alert, well appearing, and in no distress and overweight  Skin - Panus skin appears well, no current wounds or drainage       Recent Labs:  No results found for this or any previous visit (from the past 12 hour(s)). Assessment and Plan:   Pt is a 29y.o. year old female,      ICD-10-CM ICD-9-CM    1. Open wound of anterior abdominal wall, sequela S31.109S 906.0 mupirocin (BACTROBAN) 2 % ointment   2. Obesity, morbid ([facility] E66.01 278.01      Refilled Bactroban   Follow up in 3 months     Records from endocrine and nephro to be sent to office  She has her labs done once yearly and upcoming    Alexsander Kelly MD      I have discussed the diagnosis with the patient and the intended plan as seen in the above orders. The patient has received an after-visit summary and questions were answered concerning future plans.

## 2019-06-04 NOTE — PROGRESS NOTES
Chief Complaint   Patient presents with    Medication Refill    Medication Evaluation     1. Have you been to the ER, urgent care clinic since your last visit? Hospitalized since your last visit? No    2. Have you seen or consulted any other health care providers outside of the [address] [place] [address] since your last visit? Include any pap smears or colon screening.   no       amlactin--request the pump bottle    Refill[de-identified] mupirocin 2%/respiredaone(90 day supply)next refill /mepilex--wants to see if she can call in when she needs the dressing--size-length and width of wound-moderate drainage    Imipramine and protonix--3 months supply

## 2019-07-08 ENCOUNTER — OFFICE VISIT (OUTPATIENT)
Dept: FAMILY MEDICINE CLINIC | Age: 34
End: 2019-07-08

## 2019-07-08 VITALS
BODY MASS INDEX: 67.81 KG/M2 | TEMPERATURE: 98.4 F | OXYGEN SATURATION: 96 % | RESPIRATION RATE: 18 BRPM | HEART RATE: 103 BPM | WEIGHT: 293 LBS | HEIGHT: 55 IN

## 2019-07-08 DIAGNOSIS — J45.901 ASTHMA EXACERBATION, MILD: Primary | ICD-10-CM

## 2019-07-08 DIAGNOSIS — R05.9 COUGH: ICD-10-CM

## 2019-07-08 DIAGNOSIS — J02.9 SORE THROAT: ICD-10-CM

## 2019-07-08 LAB
S PYO AG THROAT QL: NEGATIVE
VALID INTERNAL CONTROL?: YES

## 2019-07-08 RX ORDER — ERYTHROMYCIN 5 MG/G
OINTMENT OPHTHALMIC
COMMUNITY
End: 2021-11-18 | Stop reason: SDUPTHER

## 2019-07-08 RX ORDER — BENZONATATE 100 MG/1
100 CAPSULE ORAL
Qty: 30 CAP | Refills: 0 | Status: SHIPPED | OUTPATIENT
Start: 2019-07-08 | End: 2019-07-15

## 2019-07-08 RX ORDER — PREDNISONE 20 MG/1
40 TABLET ORAL
Qty: 10 TAB | Refills: 0 | Status: SHIPPED | OUTPATIENT
Start: 2019-07-08 | End: 2019-07-13

## 2019-07-08 NOTE — PATIENT INSTRUCTIONS

## 2019-07-08 NOTE — PROGRESS NOTES
1. Have you been to the ER, urgent care clinic since your last visit? Hospitalized since your last visit? Yes, [facility] ER. July 4th 2019    2. Have you seen or consulted any other health care providers outside of the [address] [place] [address] since your last visit? Include any pap smears or colon screening. No    Chief Complaint   Patient presents with   [address] ER    Sore Throat     times 10 days     At [facility] ER for wheezing/breathing issues. Per patient's mother, patient feels worse than day patient went to ER. Pulse (!) 103, temperature 98.4 °F (36.9 °C), temperature source Axillary, resp. rate 18, height 4' 7\" (1.397 m), weight 317 lb (143.8 kg), SpO2 96 %.

## 2019-07-08 NOTE — PROGRESS NOTES
Subjective  Narayan Quinn is an 29 y.o. female with a history of Prader-Willi, CHF, asthma, CODY, and DM who presents for sore throat and dry cough. Started 1.5 weeks ago with with cough, wheezing, postnasal drip, and sore throat. Woke up coughing over the past 3 nights. Seen at [facility] ED on 7/4 where rapid strep and CXR negative. OTC cough syrups and Flonase ineffective. Seen by Dr. Abbie Phelps, for asthma one year ago. Using Albuterol inhaler or nebulizer once daily as needed for wheezing. Denies chest pain, SOB, n/v, fevers, chills, or sick contacts. Past Medical History - reviewed:  Past Medical History:   Diagnosis Date    Anxiety disorder     Severe    Congestive heart failure, unspecified     Diabetes ([facility] [address]     Hypertension     Ill-defined condition     lymphedema    Lymph edema     Obesity     Prader - Willi syndrome     Sleep apnea     cpap         Immunizations - reviewed:   Immunization History   Administered Date(s) Administered    Influenza Vaccine 10/08/2013, 10/20/2015    Influenza Vaccine (Quad) PF 09/08/2016, 08/29/2017, 09/12/2018    Influenza Vaccine PF 09/14/2012    Pneumococcal Conjugate (PCV-13) 05/05/2015    Pneumococcal Vaccine (Unspecified Type) 02/02/2011    TD Vaccine 12/02/2010    Tdap 09/07/2018         ROS  Pertinent ROS performed and negative except for as mentioned in HPI. Physical Exam  Visit Vitals  Pulse (!) 103   Temp 98.4 °F (36.9 °C) (Axillary)   Resp 18   Ht 4' 7\" (1.397 m)   Wt 317 lb (143.8 kg)   SpO2 96%   BMI 73.68 kg/m²       General appearance - Obese. Alert and oriented. Eyes - Sclera white. PERRL. Ears - Bilateral TM intact; no drainage. Nose - Normal and patent. Mouth - No pharyngeal erythema or exudate. Neck - Supple; no adenopathy  Chest - CTAB. Normal WOB. Heart - Tachycardic. Normal rhythm. No m/r/g. Abdomen - Soft, nontender. Assessment/Plan    ICD-10-CM ICD-9-CM    1.  Asthma exacerbation, mild J45.901 493.92 predniSONE (DELTASONE) 20 mg tablet   2. Sore throat J02.9 462 AMB POC RAPID STREP A   3. Cough R05 786.2 XR CHEST PA LAT      benzonatate (TESSALON) 100 mg capsule     1. Asthma exacerbation, mild  -Patient likely has viral URI causing mild asthma exacerbation. Tachycardia is patient's baseline; otherwise afebrile and other vitals stable. CXR and rapid strep negative. Recommend optimizing Beta agonist therapy and then fill prednisone if symptoms fail to improve by the end of this week. -Albuterol nebulizer Q4H PRN  - predniSONE (DELTASONE) 20 mg tablet; Take 40 mg by mouth daily (with breakfast) for 5 days. Dispense: 10 Tab; Refill: 0  - benzonatate (TESSALON) 100 mg capsule; Take 1 Cap by mouth three (3) times daily as needed for Cough for up to 7 days. Dispense: 30 Cap; Refill: 0    I have discussed the diagnosis with the patient and the intended plan as seen in the above orders. Patient verbalized understanding of the plan and agrees with the plan. The patient has received an after-visit summary and questions were answered concerning future plans. I have discussed medication side effects and warnings with the patient as well. Informed patient to return to the office if new symptoms arise. Patient discussed with Dr. Yovani Bull.     Rylan Aguirre MD  Family Medicine Resident

## 2019-07-12 DIAGNOSIS — R23.8 SKIN IRRITATION: ICD-10-CM

## 2019-07-15 DIAGNOSIS — R23.8 SKIN IRRITATION: ICD-10-CM

## 2019-07-15 RX ORDER — AMMONIUM LACTATE 12 G/100G
CREAM TOPICAL
Qty: 385 G | Refills: 0 | Status: SHIPPED | OUTPATIENT
Start: 2019-07-15 | End: 2019-07-15 | Stop reason: SDUPTHER

## 2019-07-16 ENCOUNTER — OFFICE VISIT (OUTPATIENT)
Dept: FAMILY MEDICINE CLINIC | Age: 34
End: 2019-07-16

## 2019-07-16 VITALS
TEMPERATURE: 98.4 F | HEART RATE: 97 BPM | OXYGEN SATURATION: 97 % | RESPIRATION RATE: 22 BRPM | BODY MASS INDEX: 67.81 KG/M2 | HEIGHT: 55 IN | WEIGHT: 293 LBS

## 2019-07-16 DIAGNOSIS — B96.89 ACUTE BACTERIAL SINUSITIS: Primary | ICD-10-CM

## 2019-07-16 DIAGNOSIS — J01.90 ACUTE BACTERIAL SINUSITIS: Primary | ICD-10-CM

## 2019-07-16 RX ORDER — AMOXICILLIN AND CLAVULANATE POTASSIUM 875; 125 MG/1; MG/1
1 TABLET, FILM COATED ORAL 2 TIMES DAILY
Qty: 20 TAB | Refills: 0 | Status: SHIPPED | OUTPATIENT
Start: 2019-07-16 | End: 2019-07-26

## 2019-07-16 RX ORDER — AMMONIUM LACTATE 12 G/100G
CREAM TOPICAL
Qty: 385 G | Refills: 0 | Status: SHIPPED | OUTPATIENT
Start: 2019-07-16 | End: 2019-07-17 | Stop reason: SDUPTHER

## 2019-07-16 NOTE — PROGRESS NOTES
[address] [place] [address]    Subjective:   Anahi Nance is a 29 y.o. female with history of see pm  CC: sore throat and congestion  History provided by patient and mother    HPI:  Started with symptoms of sore throat, wheezing and non-productive coughing spells that started 2 weeks ago. Was seen in clinic on 7/11 for asthma exarcebation, cough and sore throat and had negative CXR. She was prescribes course of steroids and albuterol. Noted marked improvement initially but now has developed congestion, cough and facial pain. Denies any fever, chills, sob. Past Medical History:   Diagnosis Date    Anxiety disorder     Severe    Congestive heart failure, unspecified     Diabetes ([facility] [address]     Hypertension     Ill-defined condition     lymphedema    Lymph edema     Obesity     Prader - Willi syndrome     Sleep apnea     cpap     Allergies   Allergen Reactions    Bactrim [Sulfamethoprim Ds] Other (comments)     Nausea, abdominal pain and cramping      Byetta [Exenatide] Rash     Current Outpatient Medications on File Prior to Visit   Medication Sig Dispense Refill    ammonium lactate (AMLACTIN) 12 % topical cream APPLY TO THE AFFECTED AREA AND RUB IN WELL TWICE DAILY 385 g 0    erythromycin (ILOTYCIN) ophthalmic ointment Administer  to both eyes nightly.  mupirocin (BACTROBAN) 2 % ointment APPLY TO AFFECTED AREA DAILY AS DIRECTED 22 g 2    imipramine (TOFRANIL) 25 mg tablet TAKE 3 TABLETS BY MOUTH AT BEDTIME 270 Tab 0    pantoprazole (PROTONIX) 40 mg tablet TAKE 1 TABLET BY MOUTH DAILY 90 Tab 11    nystatin (MYCOSTATIN) powder Apply  to affected area four (4) times daily. 240 g 3    nystatin-triamcinolone (MYCOLOG II) topical cream APPLY EXTERNALLY TO THE AFFECTED AREA TWICE DAILY 240 g 3    ALPRAZolam (XANAX) 0.25 mg tablet TAKE 1 TABLET BY MOUTH 3 TIMES A DAY AS NEEDED FOR ANXIETY 90 Tab 0    risperiDONE (RISPERDAL) 1 mg tablet Take 2 Tabs by mouth two (2) times a day.  Indications: psychosis 360 Tab 3    insulin aspart U-100 (NOVOLOG) 100 unit/mL inpn 10 Units by SubCUTAneous route Before breakfast, lunch, dinner and at bedtime. Sliding Scale      Adhesive Bandage 6 X 8 \" bndg 1 Each by Apply Externally route daily. Mepilex Transfers 6x8 inches. T14.8, 2x2cm open area with moderate drainage 1mm deep. 1 box. 1 Each 2    furosemide (LASIX) 40 mg tablet Take 40 mg by mouth daily.  naftifine (NAFTIN) 1 % topical cream APPLY EXTERNALLY TO THE AFFECTED AREA EVERY DAY FOR 2 WEEKS OR AS DIRECTED 60 g 0    albuterol (PROVENTIL HFA, VENTOLIN HFA, PROAIR HFA) 90 mcg/actuation inhaler Take 1 Puff by inhalation every four (4) hours as needed for Wheezing. Dispense 8g inhaler. 1 Inhaler 0    silver sulfADIAZINE (SILVADENE) 1 % topical cream Apply  to affected area daily. Apply daily as directed (Patient taking differently: Apply  to affected area as needed. Apply daily as directed) 85 g 1    insulin glargine (LANTUS,BASAGLAR) 100 unit/mL (3 mL) inpn by SubCUTAneous route two (2) times a day. 80mg am and 80mg pm.      furosemide (LASIX) 20 mg tablet TAKE 1 TABLET BY MOUTH EVERY DAY 90 Tab 0    hydrOXYzine (VISTARIL) 25 mg capsule TAKE 1 CAPSULE BY MOUTH THREE TIMES DAILY AS NEEDED FOR ITCHING 30 Cap 0    fluticasone (FLONASE) 50 mcg/actuation nasal spray 2 Sprays by Both Nostrils route daily. (Patient taking differently: 2 Sprays by Both Nostrils route two (2) times a day.) 1 Bottle 6    fenofibrate nanocrystallized (TRICOR) 145 mg tablet TAKE 1 TABLET BY MOUTH DAILY 90 Tab 0    [] benzonatate (TESSALON) 100 mg capsule Take 1 Cap by mouth three (3) times daily as needed for Cough for up to 7 days. 30 Cap 0    doxycycline (ADOXA) 100 mg tablet TAKE 1 TABLET BY MOUTH EVERY DAY 30 Tab 0     No current facility-administered medications on file prior to visit.       Family History   Problem Relation Age of Onset    Hypertension Mother     Hypertension Father     MS Brother      Social History     Socioeconomic History    Marital status: SINGLE     Spouse name: Not on file    Number of children: Not on file    Years of education: Not on file    Highest education level: Not on file   Tobacco Use    Smoking status: Never Smoker    Smokeless tobacco: Never Used   Substance and Sexual Activity    Alcohol use: No     Alcohol/week: 0.0 oz    Drug use: No    Sexual activity: Never     Past Surgical History:   Procedure Laterality Date    HX CHOLECYSTECTOMY  age 25   [de-identified] TONSIL AND ADENOIDECTOMY  age 9   [de-identified] WISDOM TEETH EXTRACTION  10/1/13       Patient Active Problem List   Diagnosis Code    Prader-Willi syndrome Q87.1    CHF (congestive heart failure) ([facility] I50.9    Lymphedema I89.0    Anxiety disorder F41.9    Scoliosis M41.9    DM (diabetes mellitus), type 2 ([facility] E11.9    HTN (hypertension) I10    Hyperlipidemia E78.5    Sleep apnea G47.30    Bleeding hemorrhoids K64.9    CKD (chronic kidney disease) N18.9    Urinary incontinence R32    MR (mental retardation), moderate F71    Psychosis ([facility] F29    Closed fracture of right foot with routine healing S92.901D    Obesity, morbid ([facility] E66.01    Skin abscess L02.91    Cellulitis, abdominal wall L03.311    Open wound of abdominal wall with complication H72.674A     ROS: pertinent systems reviewed and negative except as in HPI      Objective:     Visit Vitals  Pulse 97   Temp 98.4 °F (36.9 °C) (Oral)   Resp 22   Ht 4' 7\" (1.397 m)   Wt 305 lb (138.3 kg)   SpO2 97%   BMI 70.89 kg/m²        Physical Exam   Constitutional: She appears well-developed and well-nourished. HENT:   Head: Normocephalic and atraumatic. Right Ear: External ear normal.   Left Ear: External ear normal.   Mouth/Throat: Oropharynx is clear and moist. No oropharyngeal exudate. Boggy Nasal mucosa. Frontal and maxillary sinuses tender to palpation. Eyes: Pupils are equal, round, and reactive to light.  Conjunctivae are normal. Right eye exhibits no discharge. Left eye exhibits no discharge. No scleral icterus. Neck: Normal range of motion. Neck supple. No JVD present. Cardiovascular: Normal rate, regular rhythm and normal heart sounds. Pulmonary/Chest: Effort normal and breath sounds normal. No respiratory distress. She has no wheezes. She has no rales. She exhibits no tenderness. Lymphadenopathy:     She has no cervical adenopathy. Neurological: She is alert. No cranial nerve deficit. Pertinent Labs/Studies:      Assessment and orders:     Diagnoses and all orders for this visit:    1) Acute bacterial sinusitis        -     Given symptom chronicity, will rx with Augmentin x10 days  -     amoxicillin-clavulanate (AUGMENTIN) 875-125 mg per tablet; Take 1 Tab by mouth two (2) times a day for 10 days. , Normal, Disp-20 Tab, R-0          I have reviewed patient medical and social history and medications. I have reviewed pertinent labs results and other data. I have discussed the diagnosis with the patient and the intended plan as seen in the above orders. The patient has received an after-visit summary and questions were answered concerning future plans. I have discussed medication side effects and warnings with the patient as well.     Nestor Worrell MD  Resident [address] [address] [address] [place]  07/16/19    Patient discussed with Dr. Yadira Gramajo, Attending Physician

## 2019-07-17 DIAGNOSIS — R23.8 SKIN IRRITATION: ICD-10-CM

## 2019-07-17 RX ORDER — AMMONIUM LACTATE 12 G/100G
CREAM TOPICAL
Qty: 385 G | Refills: 0 | Status: SHIPPED | OUTPATIENT
Start: 2019-07-17 | End: 2019-08-14 | Stop reason: SDUPTHER

## 2019-07-30 LAB
CREATININE, EXTERNAL: 0.91
MICROALBUMIN UR TEST STR-MCNC: <3 MG/DL

## 2019-08-10 DIAGNOSIS — S31.109S OPEN WOUND OF ANTERIOR ABDOMINAL WALL, SEQUELA: ICD-10-CM

## 2019-08-12 RX ORDER — MUPIROCIN 20 MG/G
OINTMENT TOPICAL
Qty: 22 G | Refills: 0 | Status: SHIPPED | OUTPATIENT
Start: 2019-08-12 | End: 2019-08-25 | Stop reason: SDUPTHER

## 2019-08-14 DIAGNOSIS — R23.8 SKIN IRRITATION: ICD-10-CM

## 2019-08-14 RX ORDER — AMMONIUM LACTATE 12 G/100G
CREAM TOPICAL 2 TIMES DAILY
Qty: 385 G | Refills: 0 | OUTPATIENT
Start: 2019-08-14

## 2019-08-14 NOTE — TELEPHONE ENCOUNTER
Patients mother called stating the pharmacy received the script for the cream the patient needed but it came in a tube and not the pump bottle like she normally receives it. She would like a script sent in for Ammonium Lactate Cream 12% Pump bottle 385 grams. She states patient is special needs and does not understand how to measure how much to use out of the tube and is use to the pump bottle and how much to use.

## 2019-08-14 NOTE — TELEPHONE ENCOUNTER
Spoke with Margot East in regards to patient Ammonium Lactate pump. Informed Margot East after speaking with Pharmacy they states that they would inquire if they have a pump and will contact her. Pharmacy states that she feels they only have the tube but will look further in regards to Pump. Margot East verbalized understanding.

## 2019-08-15 RX ORDER — AMMONIUM LACTATE 12 G/100G
CREAM TOPICAL
Qty: 385 G | Refills: 0 | Status: SHIPPED | OUTPATIENT
Start: 2019-08-15 | End: 2019-09-14 | Stop reason: SDUPTHER

## 2019-08-22 ENCOUNTER — TELEPHONE (OUTPATIENT)
Dept: FAMILY MEDICINE CLINIC | Age: 34
End: 2019-08-22

## 2019-08-22 NOTE — TELEPHONE ENCOUNTER
[phone]  Gaurav Perez, mother    Patient gets skin break downs and has this now. She said Dr. Jacobo Leonard told her when this happens to call the office as she wants to see them. Please advise.

## 2019-08-25 DIAGNOSIS — S31.109S OPEN WOUND OF ANTERIOR ABDOMINAL WALL, SEQUELA: ICD-10-CM

## 2019-08-26 ENCOUNTER — DOCUMENTATION ONLY (OUTPATIENT)
Dept: SLEEP MEDICINE | Age: 34
End: 2019-08-26

## 2019-08-26 ENCOUNTER — OFFICE VISIT (OUTPATIENT)
Dept: SLEEP MEDICINE | Age: 34
End: 2019-08-26

## 2019-08-26 VITALS — BODY MASS INDEX: 67.81 KG/M2 | OXYGEN SATURATION: 97 % | HEART RATE: 95 BPM | WEIGHT: 293 LBS | HEIGHT: 55 IN

## 2019-08-26 DIAGNOSIS — Q87.11 PRADER-WILLI SYNDROME: ICD-10-CM

## 2019-08-26 DIAGNOSIS — I10 ESSENTIAL HYPERTENSION: ICD-10-CM

## 2019-08-26 DIAGNOSIS — G47.33 OSA (OBSTRUCTIVE SLEEP APNEA): Primary | ICD-10-CM

## 2019-08-26 NOTE — PATIENT INSTRUCTIONS
217 [facility] [place]. [place], [address]  Tel.  [phone]  Fax. [address] [facility]  [place], [address] [facility]  Tel.  [phone]  Fax. [phone] [address]  Tel.  [phone]  Fax. [phone]     Learning About CPAP for Sleep Apnea  What is CPAP? CPAP is a small machine that you use at home every night while you sleep. It increases air pressure in your throat to keep your airway open. When you have sleep apnea, this can help you sleep better so you feel much better. CPAP stands for \"continuous positive airway pressure. \"  The CPAP machine will have one of the following:  · A mask that covers your nose and mouth  · Prongs that fit into your nose  · A mask that covers your nose only, the most common type. This type is called NCPAP. The N stands for \"nasal.\"  Why is it done? CPAP is usually the best treatment for obstructive sleep apnea. It is the first treatment choice and the most widely used. Your doctor may suggest CPAP if you have:  · Moderate to severe sleep apnea. · Sleep apnea and coronary artery disease (CAD) or heart failure. How does it help? · CPAP can help you have more normal sleep, so you feel less sleepy and more alert during the daytime. · CPAP may help keep heart failure or other heart problems from getting worse. · NCPAP may help lower your blood pressure. · If you use CPAP, your bed partner may also sleep better because you are not snoring or restless. What are the side effects? Some people who use CPAP have:  · A dry or stuffy nose and a sore throat. · Irritated skin on the face. · Sore eyes. · Bloating. If you have any of these problems, work with your doctor to fix them. Here are some things you can try:  · Be sure the mask or nasal prongs fit well. · See if your doctor can adjust the pressure of your CPAP. · If your nose is dry, try a humidifier.   · If your nose is runny or stuffy, try decongestant medicine or a steroid nasal spray. If these things do not help, you might try a different type of machine. Some machines have air pressure that adjusts on its own. Others have air pressures that are different when you breathe in than when you breathe out. This may reduce discomfort caused by too much pressure in your nose. Where can you learn more? Go to [employer].be  Enter It[employer] Freeze in the search box to learn more about \"Learning About CPAP for Sleep Apnea. \"   © 5916-5138 Healthwise, Incorporated. Care instructions adapted under license by [facility] (which disclaims liability or warranty for this information). This care instruction is for use with your licensed healthcare professional. If you have questions about a medical condition or this instruction, always ask your healthcare professional. [address] any warranty or liability for your use of this information. Content Version: 6.2.67348; Last Revised: January 11, 2010  PROPER SLEEP HYGIENE    What to avoid  · Do not have drinks with caffeine, such as coffee or black tea, for 8 hours before bed. · Do not smoke or use other types of tobacco near bedtime. Nicotine is a stimulant and can keep you awake. · Avoid drinking alcohol late in the evening, because it can cause you to wake in the middle of the night. · Do not eat a big meal close to bedtime. If you are hungry, eat a light snack. · Do not drink a lot of water close to bedtime, because the need to urinate may wake you up during the night. · Do not read or watch TV in bed. Use the bed only for sleeping and sexual activity. What to try  · Go to bed at the same time every night, and wake up at the same time every morning. Do not take naps during the day. · Keep your bedroom quiet, dark, and cool. · Get regular exercise, but not within 3 to 4 hours of your bedtime. .  · Sleep on a comfortable pillow and mattress.   · If watching the clock makes you anxious, turn it facing away from you so you cannot see the time. · If you worry when you lie down, start a worry book. Well before bedtime, write down your worries, and then set the book and your concerns aside. · Try meditation or other relaxation techniques before you go to bed. · If you cannot fall asleep, get up and go to another room until you feel sleepy. Do something relaxing. Repeat your bedtime routine before you go to bed again. · Make your house quiet and calm about an hour before bedtime. Turn down the lights, turn off the TV, log off the computer, and turn down the volume on music. This can help you relax after a busy day. Drowsy Driving: The [employer] cites drowsiness as a causing factor in more than 173,729 police reported crashes annually, resulting in 76,000 injuries and 1,500 deaths. Other surveys suggest 55% of people polled have driven while drowsy in the past year, 23% had fallen asleep but not crashed, 3% crashed, and 2% had and accident due to drowsy driving. Who is at risk? Young Drivers: One study of drowsy driving accidents states that 55% of the drivers were under 25 years. Of those, 75% were male. Shift Workers and Travelers: People who work overnight or travel across time zones frequently are at higher risk of experiencing Circadian Rhythm Disorders. They are trying to work and function when their body is programed to sleep. Sleep Deprived: Lack of sleep has a serious impact on your ability to pay attention or focus on a task. Consistently getting less than the average of 8 hours your body needs creates partial or cumulative sleep deprivation. Untreated Sleep Disorders: Sleep Apnea, Narcolepsy, R.L.S., and other sleep disorders (untreated) prevent a person from getting enough restful sleep. This leads to excessive daytime sleepiness and increases the risk for drowsy driving accidents by up to 7 times.   Medications / Alcohol: Even over the counter medications can cause drowsiness. Medications that impair a drivers attention should have a warning label. Alcohol naturally makes you sleepy and on its own can cause accidents. Combined with excessive drowsiness its effects are amplified. Signs of Drowsy Driving:   * You don't remember driving the last few miles   * You may drift out of your tiffany   * You are unable to focus and your thoughts wander   * You may yawn more often than normal   * You have difficulty keeping your eyes open / nodding off   * Missing traffic signs, speeding, or tailgating  Prevention-   Good sleep hygiene, lifestyle and behavioral choices have the most impact on drowsy driving. There is no substitute for sleep and the average person requires 8 hours nightly. If you find yourself driving drowsy, stop and sleep. Consider the sleep hygiene tips provided during your visit as well. Medication Refill Policy: Refills for all medications require 1 week advance notice. Please have your pharmacy fax a refill request. We are unable to fax, or call in \"controled substance\" medications and you will need to pick these prescriptions up from our office. Moogi Activation    Thank you for requesting access to Moogi. Please follow the instructions below to securely access and download your online medical record. Moogi allows you to send messages to your doctor, view your test results, renew your prescriptions, schedule appointments, and more. How Do I Sign Up? 1. In your internet browser, go to [email or web address]. 2. Click on the First Time User? Click Here link in the Sign In box. You will see the New Member Sign Up page. 3. Enter your Moogi Access Code exactly as it appears below. You will not need to use this code after youve completed the sign-up process. If you do not sign up before the expiration date, you must request a new code.     Moogi Access Code: [email or web address]  Expires: 10/10/2019 11:41 AM (This is the date your [employer] access code will )    4. Enter the last four digits of your Social Security Number (xxxx) and Date of Birth (mm/dd/yyyy) as indicated and click Submit. You will be taken to the next sign-up page. 5. Create a Smart [employer] Systems ID. This will be your Smart [employer] Systems login ID and cannot be changed, so think of one that is secure and easy to remember. 6. Create a [employer] Systems password. You can change your password at any time. 7. Enter your Password Reset Question and Answer. This can be used at a later time if you forget your password. 8. Enter your e-mail address. You will receive e-mail notification when new information is available in [address] 9. Click Sign Up. You can now view and download portions of your medical record. 10. Click the Download Summary menu link to download a portable copy of your medical information. Additional Information    If you have questions, please call [phone]. Remember, Smart [employer] Systems is NOT to be used for urgent needs. For medical emergencies, dial 911.

## 2019-08-26 NOTE — PROGRESS NOTES
217 [facility]. [place], [address]  Tel.  [phone]  Fax. 100 [facility] [address]  [address] [facility] [address], [address]  Tel.  [phone]  Fax. [phone] [address]  Tel.  [phone]  Fax. [phone]     S>Roshni Broussard is a 29 y.o. female seen for a positive airway pressure follow-up. She reports no problems using the device. She is 100% compliant over the past 30 days. The following problems are identified:    Drowsiness no Problems exhaling no   Snoring no Forget to put on no   Mask Comfortable yes Can't fall asleep no   Dry Mouth no Mask falls off no   Air Leaking no Frequent awakenings no         She admits that her sleep has improved on PAP therapy using FF mask and heated tubing. Allergies   Allergen Reactions    Bactrim [Sulfamethoprim Ds] Other (comments)     Nausea, abdominal pain and cramping      Byetta [Exenatide] Rash       She has a current medication list which includes the following prescription(s): ammonium lactate, mupirocin, nystop, erythromycin, imipramine, pantoprazole, nystatin-triamcinolone, alprazolam, risperidone, doxycycline, insulin aspart u-100, adhesive bandage, furosemide, naftifine, albuterol, silver sulfadiazine, insulin glargine, furosemide, hydroxyzine pamoate, fluticasone propionate, and fenofibrate nanocrystallized. .      She  has a past medical history of Anxiety disorder, Congestive heart failure, unspecified, Diabetes ([address] Hypertension, Ill-defined condition, Lymph edema, Obesity, Prader - Willi syndrome, and Sleep apnea. [place] Sleepiness Score: 1   and Modified F.O.S.Q. Score Total / 2: 20   which reflect improved sleep quality over therapy time.     O>    Visit Vitals  Pulse 95   Ht 4' 7\" (1.397 m)   Wt 305 lb (138.3 kg)   SpO2 97%   BMI 70.89 kg/m²         General:   Not in acute distress   Eyes:  Anicteric sclerae, no obvious strabismus   Nose:  No obvious nasal septum deviation Oropharynx:   Class 4 oropharyngeal outlet, thick tongue base, uvula not seen due to low-lying soft palate, narrow tonsilo-pharyngeal pilars   Tonsils:   tonsils are not visualized due to low-lying soft palate   Neck:   midline trachea   Chest/Lungs:  Equal lung expansion, clear on auscultation    CVS:  Normal rate, regular rhythm; no JVD   Skin:  Warm to touch; no obvious rashes   Neuro:  No focal deficits ; no obvious tremor    Psych:  Normal affect,  normal countenance;           A>    ICD-10-CM ICD-9-CM    1. CODY (obstructive sleep apnea) G47.33 327.23 AMB SUPPLY ORDER   2. Sleep-related hypoventilation G47.36 327.29    3. BMI 70 and over, adult ([facility] [address] Z68.45 V85.45    4. Prader-Willi syndrome Q87.1 759.81    5. Essential hypertension I10 401.9      AHI = ?. On CPAP :  8 cmH2O. Compliant:      yes    Therapeutic Response:  Positive    P>    * Home nocturnal oximetry ordered to ensure adequate oxygenation on PAP Therapy. Patient's parent opted to defer this test at current time. * Diagnosis of Sleep-Related Hypoventilation and need for additional testing and potential need to change to Bi-Level therapy discussed with patient / parent. Testing declined by both. Orders Placed This Encounter    AMB SUPPLY ORDER     Diagnosis: (G47.33) CODY (obstructive sleep apnea)  (primary encounter diagnosis)     Replacement Supplies for Positive Airway Pressure Therapy Device:   Duration of need: 99 months.  Full Face Mask 1 every 3 months.  Full Face Mask Cushion 1 per month.  Headgear 1 every 6 months.  Tubing with heating element 1 every 3 months.  Filter(s) Disposable 2 per month.  Filter(s) Non-Disposable 1 every 6 months. .   [address] [facility] for Humidifier (Replace) 1 every 6 months. Stevan Juarez MD, FAASM; NPI: [id number]    Electronically signed.  Date:- 08/26/19       * We have recommended a dedicated weight loss through appropriate diet and an exercise regiment as significant weight reduction has been shown to reduce severity of obstructive sleep apnea. *   Follow-up and Dispositions    · Return in about 1 year (around 8/26/2020), or if symptoms worsen or fail to improve. * She was asked to contact our office for any problems regarding PAP therapy. * Counseling was provided regarding the importance of regular PAP use and on proper sleep hygiene. * Re-enforced proper and regular cleaning for the device. Thank you for allowing us to participate in your patient's medical care. Naomi Nuno MD, [facility]  Electronically signed.  08/26/19

## 2019-08-27 RX ORDER — MUPIROCIN 20 MG/G
OINTMENT TOPICAL
Qty: 22 G | Refills: 0 | Status: SHIPPED | OUTPATIENT
Start: 2019-08-27 | End: 2019-11-05 | Stop reason: SDUPTHER

## 2019-08-27 NOTE — TELEPHONE ENCOUNTER
Spoke with Mother Jill Quick who states she will call back next time to schedule an appt when the skin break down happens again. Informed Mother that if she saw a Provider in office will be ok to see, remind the provider seen to inform Dr Carlotta Norris of the appearance. Mother states that she was offered an appt for Wednesday in which it would have been a week and she will call next time to see if Dr Carlotta Norris is available or another provider to see Jamee Dance when the skin breakdown happens again.

## 2019-09-12 RX ORDER — IMIPRAMINE HYDROCHLORIDE 25 MG/1
TABLET ORAL
Qty: 270 TAB | Refills: 0 | Status: SHIPPED | OUTPATIENT
Start: 2019-09-12 | End: 2019-12-12 | Stop reason: SDUPTHER

## 2019-09-13 ENCOUNTER — TELEPHONE (OUTPATIENT)
Dept: FAMILY MEDICINE CLINIC | Age: 34
End: 2019-09-13

## 2019-09-13 DIAGNOSIS — F41.9 ANXIETY: ICD-10-CM

## 2019-09-13 RX ORDER — ALPRAZOLAM 0.25 MG/1
TABLET ORAL
Qty: 90 TAB | Refills: 0 | Status: SHIPPED | OUTPATIENT
Start: 2019-09-13 | End: 2019-11-05 | Stop reason: SDUPTHER

## 2019-09-13 NOTE — TELEPHONE ENCOUNTER
Seen by cardiology on 9/12/19 for HTN  And HLD f/u. No new recs. Continue Lisinopril and Lasix. F/U in 1 yr.
Breath Sounds equal & clear to percussion & auscultation, no accessory muscle use

## 2019-09-14 DIAGNOSIS — R23.8 SKIN IRRITATION: ICD-10-CM

## 2019-09-16 ENCOUNTER — OFFICE VISIT (OUTPATIENT)
Dept: FAMILY MEDICINE CLINIC | Age: 34
End: 2019-09-16

## 2019-09-16 VITALS
HEART RATE: 98 BPM | OXYGEN SATURATION: 96 % | RESPIRATION RATE: 20 BRPM | TEMPERATURE: 98.4 F | HEIGHT: 55 IN | WEIGHT: 293 LBS | BODY MASS INDEX: 67.81 KG/M2

## 2019-09-16 DIAGNOSIS — E11.65 TYPE 2 DIABETES MELLITUS WITH HYPERGLYCEMIA, UNSPECIFIED WHETHER LONG TERM INSULIN USE (HCC): Primary | ICD-10-CM

## 2019-09-16 DIAGNOSIS — Z23 ENCOUNTER FOR IMMUNIZATION: ICD-10-CM

## 2019-09-16 DIAGNOSIS — I50.9 CONGESTIVE HEART FAILURE, UNSPECIFIED HF CHRONICITY, UNSPECIFIED HEART FAILURE TYPE (HCC): ICD-10-CM

## 2019-09-16 DIAGNOSIS — B37.31 VAGINAL YEAST INFECTION: ICD-10-CM

## 2019-09-16 RX ORDER — SIMVASTATIN 40 MG/1
40 TABLET, FILM COATED ORAL DAILY
COMMUNITY

## 2019-09-16 RX ORDER — AMMONIUM LACTATE 12 G/100G
CREAM TOPICAL
Qty: 385 G | Refills: 0 | Status: SHIPPED | OUTPATIENT
Start: 2019-09-16 | End: 2020-03-12

## 2019-09-16 NOTE — PROGRESS NOTES
Would like prescriptions sent as 90 day supply. Identified pt with two pt identifiers(name and ). Reviewed record in preparation for visit and have obtained necessary documentation. Chief Complaint   Patient presents with    Vaginal Itching     x 3-4 days    Other     daily activies         Health Maintenance Due   Topic    EYE EXAM RETINAL OR DILATED     MEDICARE YEARLY EXAM     FOOT EXAM Q1     Influenza Age 5 to Adult        Visit Vitals  Pulse 98   Temp 98.4 °F (36.9 °C) (Oral)   Resp 20   Ht 4' 7\" (1.397 m)   Wt 303 lb (137.4 kg)   SpO2 96%   BMI 70.42 kg/m²         Coordination of Care Questionnaire:  :   1) Have you been to an emergency room, urgent care, or hospitalized since your last visit? If yes, where when, and reason for visit? no       2. Have seen or consulted any other health care provider since your last visit? If yes, where when, and reason for visit? YES When: 2019 Where: Dermatologist Dr Krystal Rankin Reason: acne      3) Do you have an Advanced Directive/ Living Will in place? NO  If no, would you like information NO    Patient is accompanied by mother I have received verbal consent from Bruce Dempsey to discuss any/all medical information while they are present in the room.

## 2019-09-16 NOTE — PROGRESS NOTES
History of Present Illness:     Chief Complaint   Patient presents with    Vaginal Itching     x 3-4 days    Other     daily activies      Pt is a 29y.o. year old female    Presents to clinic for vaginal itching. Started 3-4 days ago  Described as itching in the vaginal area  Tried Monostat cream and used 1 dose of Monostat   Denies pain with urination, blood in urine, urinary frequency    CHF  Followed by Cardiology, Dr. Tamie Lagos  Annual follow up    T2DM  Followed by endocrine  Last A1c in 5/2019 was 6.1%  See summary of LOV detailed in problem list  Foot exams done by Podiatry (Dr. Rey Fletcher); recently completed 8/2019   Last eye exam was June 2019    Mother requesting 90 day supplies of medications      Past Medical History:   Diagnosis Date    Anxiety disorder     Severe    Congestive heart failure, unspecified     Diabetes ([address]     Hypertension     Ill-defined condition     lymphedema    Lymph edema     Obesity     Prader - Willi syndrome     Sleep apnea     cpap         Current Outpatient Medications on File Prior to Visit   Medication Sig Dispense Refill    ammonium lactate (AMLACTIN) 12 % topical cream APPLY TO THE AFFECTED AREA AND RUB IN WELL TWICE DAILY 385 g 0    ALPRAZolam (XANAX) 0.25 mg tablet TAKE 1 TABLET BY MOUTH THREE TIMES DAILY AS NEEDED FOR ANXIETY 90 Tab 0    imipramine (TOFRANIL) 25 mg tablet TAKE 3 TABLETS BY MOUTH EVERY NIGHT AT BEDTIME 270 Tab 0    mupirocin (BACTROBAN) 2 % ointment APPLY EXTERNALLY TO THE AFFECTED AREA DAILY AS DIRECTED 22 g 0    NYSTOP powder APPLY TO AFFECTED AREA FOUR TIMES DAILY 240 g 3    erythromycin (ILOTYCIN) ophthalmic ointment Administer  to both eyes nightly.  pantoprazole (PROTONIX) 40 mg tablet TAKE 1 TABLET BY MOUTH DAILY 90 Tab 11    nystatin-triamcinolone (MYCOLOG II) topical cream APPLY EXTERNALLY TO THE AFFECTED AREA TWICE DAILY 240 g 3    risperiDONE (RISPERDAL) 1 mg tablet Take 2 Tabs by mouth two (2) times a day. Indications: psychosis 360 Tab 3    insulin aspart U-100 (NOVOLOG) 100 unit/mL inpn 40 Units by SubCUTAneous route Before breakfast, lunch, dinner and at bedtime. Sliding Scale      Adhesive Bandage 6 X 8 \" bndg 1 Each by Apply Externally route daily. Mepilex Transfers 6x8 inches. T14.8, 2x2cm open area with moderate drainage 1mm deep. 1 box. 1 Each 2    furosemide (LASIX) 40 mg tablet Take 40 mg by mouth daily.  naftifine (NAFTIN) 1 % topical cream APPLY EXTERNALLY TO THE AFFECTED AREA EVERY DAY FOR 2 WEEKS OR AS DIRECTED 60 g 0    albuterol (PROVENTIL HFA, VENTOLIN HFA, PROAIR HFA) 90 mcg/actuation inhaler Take 1 Puff by inhalation every four (4) hours as needed for Wheezing. Dispense 8g inhaler. 1 Inhaler 0    silver sulfADIAZINE (SILVADENE) 1 % topical cream Apply  to affected area daily. Apply daily as directed (Patient taking differently: Apply  to affected area as needed. Apply daily as directed) 85 g 1    insulin glargine (LANTUS,BASAGLAR) 100 unit/mL (3 mL) inpn by SubCUTAneous route two (2) times a day. 80mg am and 80mg pm.      furosemide (LASIX) 20 mg tablet TAKE 1 TABLET BY MOUTH EVERY DAY 90 Tab 0    hydrOXYzine (VISTARIL) 25 mg capsule TAKE 1 CAPSULE BY MOUTH THREE TIMES DAILY AS NEEDED FOR ITCHING 30 Cap 0    fluticasone (FLONASE) 50 mcg/actuation nasal spray 2 Sprays by Both Nostrils route daily. (Patient taking differently: 2 Sprays by Both Nostrils route two (2) times a day.) 1 Bottle 6    fenofibrate nanocrystallized (TRICOR) 145 mg tablet TAKE 1 TABLET BY MOUTH DAILY 90 Tab 0    simvastatin (ZOCOR) 40 mg tablet Take 40 mg by mouth daily.  doxycycline (ADOXA) 100 mg tablet TAKE 1 TABLET BY MOUTH EVERY DAY 30 Tab 0     No current facility-administered medications on file prior to visit. Allergies:   Allergies   Allergen Reactions    Bactrim [Sulfamethoprim Ds] Other (comments)     Nausea, abdominal pain and cramping      Byetta [Exenatide] Rash         Review of Systems:  Denies fever, chills, sweats  Denies chest pain, BENDER, palpitations, LE edema  Denies dysuria, hematuria, urinary frequency or urgency       Objective:     Vitals:    09/16/19 0812   Pulse: 98   Resp: 20   Temp: 98.4 °F (36.9 °C)   TempSrc: Oral   SpO2: 96%   Weight: 303 lb (137.4 kg)   Height: 4' 7\" (1.397 m)       Physical Exam:  General appearance - alert, well appearing, and in no distress and overweight  Mental status - Child like affect, pleasant, alert  Chest - no tachypnea, retractions or cyanosis  Neurological - alert, oriented, normal speech, no focal findings or movement disorder noted      Recent Labs:  No results found for this or any previous visit (from the past 12 hour(s)). Assessment and Plan:   Pt is a 29y.o. year old female,      ICD-10-CM ICD-9-CM    1. Type 2 diabetes mellitus with hyperglycemia, unspecified whether long term insulin use (HCC) E11.65 250.00  DIABETES FOOT EXAM       DIABETES EYE EXAM   2. Congestive heart failure, unspecified HF chronicity, unspecified heart failure type ([facility]) I50.9 428.0    3. Vaginal yeast infection B37.3 112.1 clotrimazole (GYNE-LOTRIMIN) 2 % vaginal cream   4. Encounter for immunization Z23 V03.89 INFLUENZA VIRUS VAC QUAD,SPLIT,PRESV FREE SYRINGE IM      AR IMMUNIZ ADMIN,1 SINGLE/COMB VAC/TOXOID     Reviewed CHF and DM  Followed by respective specialists  Mother having updated notes sent to office    Trial Clotrimazole cream for vaginal itching    Follow up in 6 months or sooner if needed    Bran Gomez MD      I have discussed the diagnosis with the patient and the intended plan as seen in the above orders. The patient has received an after-visit summary and questions were answered concerning future plans.

## 2019-09-17 PROBLEM — S31.109A OPEN WOUND OF ABDOMINAL WALL WITH COMPLICATION: Status: RESOLVED | Noted: 2018-05-23 | Resolved: 2019-09-17

## 2019-09-17 PROBLEM — L03.311 CELLULITIS, ABDOMINAL WALL: Status: RESOLVED | Noted: 2018-05-18 | Resolved: 2019-09-17

## 2019-10-08 NOTE — MR AVS SNAPSHOT
Visit Information Date & Time Provider Department Dept. Phone Encounter #  
 3/15/2017 11:00 AM Aurora Cruz MD [facility]5 [facility] [phone] [id number] Your Appointments 5/25/2017 11:00 AM  
Any with Mone Berg MD  
[address] ([address]) Appt Note: 1 year CPAP follow up- bring machine_ last seen 05/26/16_ DME: [address] Patient [address] [id number] [address] [facility] [id number] Upcoming Health Maintenance Date Due HEMOGLOBIN A1C Q6M 9/17/2016 LIPID PANEL Q1 3/17/2017 MICROALBUMIN Q1 4/19/2017 EYE EXAM RETINAL OR DILATED Q1 4/21/2017 FOOT EXAM Q1 1/27/2018 DTaP/Tdap/Td series (2 - Td) 9/1/2026 Allergies as of 3/15/2017  Review Complete On: 3/15/2017 By: Aurora Cruz MD  
  
 Severity Noted Reaction Type Reactions Bactrim [Sulfamethoprim Ds]  11/09/2015    Other (comments) Nausea, abdominal pain and cramping Byetta [Exenatide]  10/21/2010    Rash Current Immunizations  Reviewed on 11/9/2015 Name Date Influenza Vaccine 10/20/2015, 10/8/2013 Influenza Vaccine (Quad) PF 9/8/2016 Influenza Vaccine PF 9/14/2012 Pneumococcal Conjugate (PCV-13) 5/5/2015 Pneumococcal Vaccine (Unspecified Type) 2/2/2011 TD Vaccine 12/2/2010 Not reviewed this visit You Were Diagnosed With   
  
 Codes Comments Gastroesophageal reflux disease without esophagitis    -  Primary ICD-10-CM: K21.9 ICD-9-CM: 530.81 Prader-Willi syndrome     ICD-10-CM: Q87.1 ICD-9-CM: 759.81 Stress at home     ICD-10-CM: F43.9 ICD-9-CM: V61.9 BMI 60.0-69.9, adult [facility])     ICD-10-CM: Z68.44 
ICD-9-CM: V85.44 Vitals Temp Resp Height(growth percentile) Weight(growth percentile) SpO2 BMI  
 97.4 °F (36.3 °C) (Oral) 24 4' 7\" (1.397 m) 275 lb (124.7 kg) 98% 63.92 kg/m2 OB Status Smoking Status Needs review Never Smoker BMI and BSA Data Body Mass Index Body Surface Area  
 63.92 kg/m 2 2.2 m 2 Preferred Pharmacy Pharmacy Name Phone [facility] [address]  [facility] [phone] Your Updated Medication List  
  
   
This list is accurate as of: 3/15/17 12:13 PM.  Always use your most recent med list.  
  
  
  
  
 Adhesive Bandage 6 X 8 \" Bndg Commonly known as:  Tendra Mepilex Border 1 Each by Apply Externally route daily. One, T14.8, 2x4cm open area with bloody exudate ALPRAZolam 0.25 mg tablet Commonly known as:  XANAX  
TAKE 1 TABLET BY MOUTH THREE TIMES DAILY AS NEEDED  
  
 ammonium lactate 12 % topical cream  
Commonly known as:  AMLACTIN  
APPLY TOPICALLY TO THE AFFECTED AREA TWICE DAILY AND RUB WELL  
  
 diclofenac 1 % Gel Commonly known as:  VOLTAREN Apply  to affected area four (4) times daily. fenofibrate nanocrystallized 145 mg tablet Commonly known as:  TRICOR  
TAKE 1 TABLET BY MOUTH DAILY  
  
 fexofenadine 180 mg tablet Commonly known as:  Rock Holstein Take 1 Tab by mouth daily. fluticasone 50 mcg/actuation nasal spray Commonly known as:  Florencio Merles 2 Sprays by Both Nostrils route daily. furosemide 20 mg tablet Commonly known as:  LASIX TAKE 1 TABLET BY MOUTH EVERY DAY  
  
 hydrOXYzine pamoate 25 mg capsule Commonly known as:  VISTARIL  
TAKE 1 CAPSULE BY MOUTH THREE TIMES DAILY AS NEEDED FOR ITCHING  
  
 meloxicam 7.5 mg tablet Commonly known as:  MOBIC Take 1 Tab by mouth daily.  Indications: OSTEOARTHRITIS  
  
 mupirocin calcium 2 % topical cream  
Commonly known as:  BACTROBAN  
APPLY EXTERNALLY TO THE AFFECTED AREA EVERY DAY AS DIRECTED  
  
 naftifine 1 % topical cream  
Commonly known as:  NAFTIN  
APPLY EXTERNALLY TO THE AFFECTED AREA EVERY DAY FOR 2 WEEKS OR AS DIRECTED  
  
 nystatin-triamcinolone topical cream  
Commonly known as:  MYCOLOG II  
APPLY EXTERNALLY TO THE AFFECTED AREA TWICE DAILY pantoprazole 40 mg tablet Commonly known as:  PROTONIX Take 1 Tab by mouth daily. risperiDONE 1 mg tablet Commonly known as:  RisperDAL TAKE 1 TABLET BY MOUTH TWICE DAILY  
  
 traMADol 50 mg tablet Commonly known as:  ULTRAM  
Take 50 mg by mouth every six (6) hours as needed for Pain. Prescriptions Sent to Pharmacy Refills  
 pantoprazole (PROTONIX) 40 mg tablet 1 Sig: Take 1 Tab by mouth daily. Class: Normal  
 Pharmacy: [facility] [address] [place], [place] [address] AT [facility] [address] ([facility] Ph #: [phone] Route: Oral  
  
We Performed the Following REFERRAL TO BARIATRIC SURGERY [FTS241 Custom] Comments:  
 Please evaluate patient for obesity. REFERRAL TO PSYCHOLOGY [TTT68 Custom] Comments:  
 Please evaluate patient for stress. Referral Information Referral ID Referred By Referred To  
  
 [id number] Leodan Calle Not Available Visits Status Start Date End Date 1 New Request 3/15/17 3/15/18 If your referral has a status of pending review or denied, additional information will be sent to support the outcome of this decision. Referral ID Referred By Referred To  
 [id number] Meredith Meeks MD  
   [address] [place] [address] [address] [place] [address] Phone: [phone] Fax: [phone] Visits Status Start Date End Date 1 New Request 3/15/17 3/15/18 If your referral has a status of pending review or denied, additional information will be sent to support the outcome of this decision. Patient Instructions Consider counseling with Dr. Harleen Paulino #[phone] or Dr. Lara Silvestre #[phone] If needed, consider Tish Cline at #[phone] or [phone] after hours Please call Fay Loyd to help arrange and authorize any tests and/or referrals. Her # is [phone] Introducing [facility]! Vicky Gooden introduces [employer] patient portal. Now you can access parts of your medical record, email your doctor's office, and request medication refills online. 1. In your internet browser, go to [email or web address] 2. Click on the First Time User? Click Here link in the Sign In box. You will see the New Member Sign Up page. 3. Enter your [employer] Access Code exactly as it appears below. You will not need to use this code after youve completed the sign-up process. If you do not sign up before the expiration date, you must request a new code. · [employer] Access Code: [email or web address] Expires: 6/13/2017 12:13 PM 
 
4. Enter the last four digits of your Social Security Number (xxxx) and Date of Birth (mm/dd/yyyy) as indicated and click Submit. You will be taken to the next sign-up page. 5. Create a [employer] ID. This will be your [employer] login ID and cannot be changed, so think of one that is secure and easy to remember. 6. Create a [employer] password. You can change your password at any time. 7. Enter your Password Reset Question and Answer. This can be used at a later time if you forget your password. 8. Enter your e-mail address. You will receive e-mail notification when new information is available in [address] 9. Click Sign Up. You can now view and download portions of your medical record. 10. Click the Download Summary menu link to download a portable copy of your medical information. If you have questions, please visit the Frequently Asked Questions section of the [employer] website. Remember, [employer] is NOT to be used for urgent needs. For medical emergencies, dial 911. Now available from your iPhone and Android! Please provide this summary of care documentation to your next provider. Your primary care clinician is listed as Eduardo Florian. If you have any questions after today's visit, please call [phone]. ambulatory

## 2019-11-04 ENCOUNTER — TELEPHONE (OUTPATIENT)
Dept: FAMILY MEDICINE CLINIC | Age: 34
End: 2019-11-04

## 2019-11-04 NOTE — TELEPHONE ENCOUNTER
The pharmacy sent two requests for prior authorizations. Ammonium lactate 12% cream 140GM: plan does not cover this medication. Please call plan at [phone] to initiate prior authorization or call/fax pharmacy to change medication. Patient ID #[id number]    Ammonium lac 12% cream 280GM: plan does not cover this medication. Please call plan at [phone] to initiate prior authorization or call/fax pharmacy to change medication.  Patient ID #[id number]

## 2019-11-05 DIAGNOSIS — F41.9 ANXIETY: ICD-10-CM

## 2019-11-05 DIAGNOSIS — S31.109S OPEN WOUND OF ANTERIOR ABDOMINAL WALL, SEQUELA: ICD-10-CM

## 2019-11-05 RX ORDER — MUPIROCIN 20 MG/G
OINTMENT TOPICAL
Qty: 22 G | Refills: 0 | Status: SHIPPED | OUTPATIENT
Start: 2019-11-05 | End: 2020-01-21

## 2019-11-05 RX ORDER — ALPRAZOLAM 0.25 MG/1
TABLET ORAL
Qty: 90 TAB | Refills: 0 | Status: SHIPPED | OUTPATIENT
Start: 2019-11-05 | End: 2020-02-13

## 2019-12-02 ENCOUNTER — PATIENT OUTREACH (OUTPATIENT)
Dept: FAMILY MEDICINE CLINIC | Age: 34
End: 2019-12-02

## 2019-12-03 ENCOUNTER — TELEPHONE (OUTPATIENT)
Dept: FAMILY MEDICINE CLINIC | Age: 34
End: 2019-12-03

## 2019-12-03 NOTE — TELEPHONE ENCOUNTER
Dr. Hudson Soulier   Received: Today   Message Contents   Yuliya Marcio Front Office   Phone Number: [phone]             Mother, Ese Pal, is returning call from [facility].  Please by 11:00 am.

## 2019-12-03 NOTE — TELEPHONE ENCOUNTER
Mother returning call in regards to Nurse reaching out to patient. Informed Mother that we received paperwork requesting additional documentation needed in regards to patient Wound. Mother states Ousmane Cortes came out to assess patient Wound. Inquired if they did measurements on patient. Mother states that it was 2 cm moderate drainage, with no debridement. Informed Mother that I will reach out to [facility] to see what is needed from our end. Contacted [facility] spoke with Jayde FELIX who inquired if we had wound information for patient, along with thickness, and if the patient wound was debrided. Informed Jayde that it was measured at 2 cm moderate drainage, with no debridement. Zuhair Villa states that the insurance have changed and if there have not bee debridement patient request is denied. Zuhair Villa is asking for Wound notes. Routing to Dr Yvonne Apodaca.

## 2019-12-12 RX ORDER — IMIPRAMINE HYDROCHLORIDE 25 MG/1
TABLET ORAL
Qty: 270 TAB | Refills: 0 | Status: SHIPPED | OUTPATIENT
Start: 2019-12-12 | End: 2020-03-10

## 2020-01-17 DIAGNOSIS — S31.109S OPEN WOUND OF ANTERIOR ABDOMINAL WALL, SEQUELA: ICD-10-CM

## 2020-01-21 RX ORDER — MUPIROCIN 20 MG/G
OINTMENT TOPICAL
Qty: 22 G | Refills: 0 | Status: SHIPPED | OUTPATIENT
Start: 2020-01-21 | End: 2020-06-08

## 2020-02-13 DIAGNOSIS — F41.9 ANXIETY: ICD-10-CM

## 2020-02-13 RX ORDER — ALPRAZOLAM 0.25 MG/1
TABLET ORAL
Qty: 90 TAB | Refills: 0 | Status: SHIPPED | OUTPATIENT
Start: 2020-02-13 | End: 2021-11-19 | Stop reason: SDUPTHER

## 2020-03-10 RX ORDER — IMIPRAMINE HYDROCHLORIDE 25 MG/1
TABLET ORAL
Qty: 270 TAB | Refills: 0 | Status: SHIPPED | OUTPATIENT
Start: 2020-03-10 | End: 2020-06-08

## 2020-03-12 DIAGNOSIS — R23.8 SKIN IRRITATION: ICD-10-CM

## 2020-03-12 RX ORDER — AMMONIUM LACTATE 12 G/100G
CREAM TOPICAL
Qty: 385 G | Refills: 3 | Status: SHIPPED | OUTPATIENT
Start: 2020-03-12 | End: 2021-10-06

## 2020-04-13 RX ORDER — NYSTATIN 100000 [USP'U]/G
POWDER TOPICAL
Qty: 240 G | Refills: 3 | Status: SHIPPED | OUTPATIENT
Start: 2020-04-13 | End: 2020-08-24

## 2020-06-03 ENCOUNTER — TELEPHONE (OUTPATIENT)
Dept: FAMILY MEDICINE CLINIC | Age: 35
End: 2020-06-03

## 2020-06-03 NOTE — TELEPHONE ENCOUNTER
I called Antonia Mota to touched base with them in regards to any needs they may have. Pt called 3 times and unable to reach. This outreach was a proactive measure to see if pt had any acute concerns or needs medication refill.     Dory Coates, DO

## 2020-06-07 DIAGNOSIS — S31.109S OPEN WOUND OF ANTERIOR ABDOMINAL WALL, SEQUELA: ICD-10-CM

## 2020-06-08 RX ORDER — MUPIROCIN 20 MG/G
OINTMENT TOPICAL
Qty: 22 G | Refills: 0 | Status: SHIPPED | OUTPATIENT
Start: 2020-06-08 | End: 2020-07-07

## 2020-06-08 RX ORDER — IMIPRAMINE HYDROCHLORIDE 25 MG/1
TABLET ORAL
Qty: 270 TAB | Refills: 0 | Status: SHIPPED | OUTPATIENT
Start: 2020-06-08 | End: 2020-09-09

## 2020-06-23 NOTE — PROGRESS NOTES
[address] [place] [address] with [address] [place] [address]     Chief Complaint: Temitope  on leg\"    Subjective  Milan Andrade is an 29 y.o. female who presents for painful red spot on her leg. This is a new problem. History was obtained from the patient's mother. Spot has been present for 2-3 days. No injury (including nicks and scratches) to the location. No drainage noted. There is no fever/chills, chest pain, shortness of breath. Has not tried any topical creams/lotions. The affected area is not pruritic. Never had anything like this before. She has a history of Prader-Willi syndrome, severe obesity, and difficult to control diabetes. Allergies - reviewed: Allergies   Allergen Reactions    Bactrim [Sulfamethoprim Ds] Other (comments)     Nausea, abdominal pain and cramping      Byetta [Exenatide] Rash       Medications - reviewed:   Current Outpatient Medications   Medication Sig    cephALEXin (KEFLEX) 500 mg capsule Take 1 Cap by mouth four (4) times daily for 10 days.  nystatin (MYCOSTATIN) powder Apply  to affected area four (4) times daily.  ALPRAZolam (XANAX) 0.25 mg tablet TAKE 1 TABLET BY MOUTH 3 TIMES A DAY AS NEEDED FOR ANXIETY    ammonium lactate (AMLACTIN) 12 % topical cream APPLY TO THE AFFECTED AREA AND RUB IN WELL TWICE DAILY    nystatin-triamcinolone (MYCOLOG II) topical cream APPLY EXTERNALLY TO THE AFFECTED AREA TWICE DAILY    mupirocin (BACTROBAN) 2 % ointment APPLY TO AFFECTED AREA DAILY AS DIRECTED    doxycycline (ADOXA) 100 mg tablet TAKE 1 TABLET BY MOUTH EVERY DAY    pantoprazole (PROTONIX) 40 mg tablet Take 1 Tab by mouth daily.  insulin aspart U-100 (NOVOLOG) 100 unit/mL inpn 10 Units by SubCUTAneous route Before breakfast, lunch, dinner and at bedtime. Sliding Scale    Adhesive Bandage 6 X 8 \" bndg 1 Each by Apply Externally route daily. Mepilex Transfers 6x8 inches. T14.8, 2x2cm open area with moderate drainage 1mm deep.  1 Hospitalist Progress Note    Patient: Benito Bennett MRN: [id number]  CSN: [id number]    YOB: 1939  Age: [de-identified] y.o.   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, prior extensive surgery    Nephrostomy in place prior to admission here-became dislodged at [facility]     Legal blindness     Right AKA/vascular disease    Left heel ulcer    colostomy     Anemia-no signs for bleeding      HTN-resumed home meds     IDDM type 2-resumed lantus, SSI PRN     Hypothyroid-resumed synthroid     hold eliquis for now-picc to be placed-then resume eliquis     Urology and nephrology consults  Appreciate the help          Daily BMP     Prognosis guarded-DNR, covid surveillance test result pending    Disposition :  Patient Active Problem List   Diagnosis Code    Pyelonephritis N12    S/p nephrectomy Z90.5    Right flank pain R10.9    Malfunction of nephrostomy tube ([address] T83.098A    Nephrostomy tube displaced ([address] T83.022A    UTI (urinary tract infection) N39.0    Elevated WBC count D72.829    Diabetes ([address] E11.9    Legally blind H54.8    Hypertension I10    Thyroid disease E07.9    Elevated serum creatinine R79.89    S/P colectomy Z90.49    Skin lesion of foot L98.9    Tear of skin of buttock S31.801A    Pressure injury of right heel, unstageable (HCC) L89.610    Hypomagnesemia E83.42    Hyperkalemia E87.5    Heel ulcer ([address] L97.409    ANTONI (acute kidney injury) ([address] N17.9    S/P AKA (above knee amputation), right ([address] Z89.611    Nephrostomy status ([address] Z93.6    Obstructed box.    risperiDONE (RISPERDAL) 1 mg tablet Take 2 Tabs by mouth two (2) times a day. Indications: psychosis (Patient taking differently: Take 2 mg by mouth two (2) times a day. Indications: psychosis, states she takes 2 mg)    furosemide (LASIX) 40 mg tablet Take 40 mg by mouth daily.  naftifine (NAFTIN) 1 % topical cream APPLY EXTERNALLY TO THE AFFECTED AREA EVERY DAY FOR 2 WEEKS OR AS DIRECTED    albuterol (PROVENTIL HFA, VENTOLIN HFA, PROAIR HFA) 90 mcg/actuation inhaler Take 1 Puff by inhalation every four (4) hours as needed for Wheezing. Dispense 8g inhaler.  silver sulfADIAZINE (SILVADENE) 1 % topical cream Apply  to affected area daily. Apply daily as directed (Patient taking differently: Apply  to affected area as needed. Apply daily as directed)    insulin glargine (LANTUS,BASAGLAR) 100 unit/mL (3 mL) inpn by SubCUTAneous route two (2) times a day. 80mg am and 80mg pm.    furosemide (LASIX) 20 mg tablet TAKE 1 TABLET BY MOUTH EVERY DAY    hydrOXYzine (VISTARIL) 25 mg capsule TAKE 1 CAPSULE BY MOUTH THREE TIMES DAILY AS NEEDED FOR ITCHING    fluticasone (FLONASE) 50 mcg/actuation nasal spray 2 Sprays by Both Nostrils route daily. (Patient taking differently: 2 Sprays by Both Nostrils route as needed.)    fenofibrate nanocrystallized (TRICOR) 145 mg tablet TAKE 1 TABLET BY MOUTH DAILY     No current facility-administered medications for this visit.         I have reviewed and updated the histories as listed below:    Past Medical History - reviewed:  Past Medical History:   Diagnosis Date    Anxiety disorder     Severe    Congestive heart failure, unspecified     Diabetes [facility]     Hypertension     Ill-defined condition     lymphedema    Lymph edema     Obesity     Prader - Willi syndrome     Sleep apnea     cpap         Past Surgical History - reviewed:   Past Surgical History:   Procedure Laterality Date    HX CHOLECYSTECTOMY  age 25   [facility] TONSIL AND ADENOIDECTOMY  age 9   [facility] nephrostomy tube [facility]) T83.092A    Nephrostomy complication ([address] Z74.552    Acute renal failure (ARF) (HCC) N17.9    Elevated INR R79.1       Subjective:    She denies CP, SOB, palp, nausea    Review of systems:    Constitutional: denies fevers, chills  Respiratory: denies SOB, cough  Gastrointestinal: denies nausea, vomiting, diarrhea      Vital signs/Intake and Output:  Visit Vitals  BP (!) 122/38 (BP 1 Location: Right arm, BP Patient Position: At rest)   Pulse 95   Temp 97.9 °F (36.6 °C)   Resp 19   Ht 5' 2\" (1.575 m)   Wt 78 kg (172 lb)   SpO2 100%   Breastfeeding No   BMI 31.46 kg/m²     Current Shift:  06/22 1901 - 06/23 0700  In: -   Out: 200 [Urine:200]  Last three shifts:  06/21 0701 - 06/22 1900  In: 767.1 [I.V.:767.1]  Out: 1925 [Urine:1050]    Exam:    General:elderly WF debilitated, blind, alert, NAD, OX3  Head/Neck: NCAT, supple, No masses, No lymphadenopathy  CVS:Regular rate and rhythm, no M/R/G, S1/S2 heard, no thrill  Lungs:Clear to auscultation bilaterally, no wheezes, rhonchi, or rales  Abdomen: Soft, Nontender, No distention, Normal Bowel sounds, No hepatomegaly  Extremities:Left leg IO line, Right AKA  Skin:normal texture and turgor, no rashes, no lesions  Neuro:grossly normal , follows commands  Psych:appropriate                Labs: Results:       Chemistry Recent Labs     06/23/20  0056 06/22/20  0330 06/21/20  1035   * 129* 240*    139 133*   K 3.6 4.2 5.0    102 99*   CO2 27 26 23   BUN 43* 52* 47*   CREA 3.08* 4.34* 4.72*   CA 7.6* 8.1* 8.5   AGAP 9 11 11   BUCR 14 12 10*      CBC w/Diff Recent Labs     06/23/20  0056 06/22/20  0330 06/21/20  2325   WBC 8.6 9.4 13.3*   RBC 2.87* 2.83* 3.88*   HGB 8.1* 8.0* 11.2*   HCT 25.9* 25.5* 35.0    261 300      Cardiac Enzymes Recent Labs     06/21/20  1035   *   CKND1 CALCULATION NOT PERFORMED WHEN RESULT IS BELOW LINEAR LIMIT      Coagulation Recent Labs     06/23/20  0056 06/22/20  0330   PTP 18.8* 22.5*   INR 1. 6* 2.0*       Lipid Panel Lab Results   Component Value Date/Time    Cholesterol, total 115 08/28/2019 08:22 AM    HDL Cholesterol 39 (L) 08/28/2019 08:22 AM    LDL, calculated 46.2 08/28/2019 08:22 AM    VLDL, calculated 29.8 08/28/2019 08:22 AM    Triglyceride 149 08/28/2019 08:22 AM    CHOL/HDL Ratio 2.9 08/28/2019 08:22 AM      BNP No results for input(s): BNPP in the last 72 hours. Liver Enzymes No results for input(s): TP, ALB, TBIL, AP in the last 72 hours.     No lab exists for component: SGOT, GPT, DBIL   Thyroid Studies Lab Results   Component Value Date/Time    TSH 3.40 01/24/2020 08:35 AM        Procedures/imaging: see electronic medical records for all procedures/Xrays and details which were not copied into this note but were reviewed prior to creation of Dony Astudillo MD [address] EXTRACTION  10/1/13         Social History - reviewed:  Social History     Socioeconomic History    Marital status: SINGLE     Spouse name: Not on file    Number of children: Not on file    Years of education: Not on file    Highest education level: Not on file   Social Needs    Financial resource strain: Not on file    Food insecurity - worry: Not on file    Food insecurity - inability: Not on file    Transportation needs - medical: Not on file   [employer] needs - non-medical: Not on file   Occupational History    Not on file   Tobacco Use    Smoking status: Never Smoker    Smokeless tobacco: Never Used   Substance and Sexual Activity    Alcohol use: No     Alcohol/week: 0.0 oz    Drug use: No    Sexual activity: No   Other Topics Concern    Not on file   Social History Narrative    Not on file         Family History - reviewed:  Family History   Problem Relation Age of Onset    Hypertension Mother     Hypertension Father     MS Brother          Immunizations - reviewed:   Immunization History   Administered Date(s) Administered    Influenza Vaccine 10/08/2013, 10/20/2015    Influenza Vaccine (Quad) PF 09/08/2016, 08/29/2017, 09/12/2018    Influenza Vaccine PF 09/14/2012    Pneumococcal Conjugate (PCV-13) 05/05/2015    Pneumococcal Vaccine (Unspecified Type) 02/02/2011    TD Vaccine 12/02/2010    Tdap 09/07/2018     Review of Systems  Review of Systems   Constitutional: Negative for chills and fever. HENT: Negative for congestion. Respiratory: Negative for shortness of breath. Cardiovascular: Negative for chest pain. Gastrointestinal: Negative for abdominal pain, constipation, diarrhea, nausea and vomiting. Physical Exam    Visit Vitals  Pulse 98   Temp 98.6 °F (37 °C) (Axillary)   Resp 24   Ht 4' 7\" (1.397 m)   Wt 314 lb 6 oz (142.6 kg)   SpO2 99%   BMI 73.07 kg/m²       Physical Exam   Constitutional: She is oriented to person, place, and time.  She appears well-nourished. No distress. Severe obesity. HENT:   Head: Normocephalic. Right Ear: Hearing and external ear normal.   Left Ear: Hearing and external ear normal.   Mouth/Throat: Oropharynx is clear and moist. No oropharyngeal exudate. Poor dentition. Eyes: Conjunctivae and EOM are normal. Pupils are equal, round, and reactive to light. Right eye exhibits no discharge. Left eye exhibits no discharge. Neck: Normal range of motion. Neck supple. No thyromegaly present. Cardiovascular: Normal rate, regular rhythm, normal heart sounds and intact distal pulses. Exam reveals no gallop and no friction rub. No murmur heard. Pulmonary/Chest: Effort normal and breath sounds normal. No respiratory distress. She has no wheezes. She has no rales. She exhibits no tenderness. Musculoskeletal: Normal range of motion. She exhibits no edema. Lymphadenopathy:     She has no cervical adenopathy. Neurological: She is alert and oriented to person, place, and time. She has normal strength. No cranial nerve deficit. Skin: Skin is warm and dry. She is not diaphoretic. No erythema. 5cm x 7cm round area of induration and erythema on right inner thigh. Painful to palpation. Psychiatric: She has a normal mood and affect. Nursing note and vitals reviewed. Assessment    ICD-10-CM ICD-9-CM    1. Cellulitis of right lower extremity L03.115 682.6 cephALEXin (KEFLEX) 500 mg capsule     Plan  1. Cellulitis of right lower extremity - will treat with keflex. Precautions given. Return to care if area is worsening or failing to improve. Per chart review--had similar experience in 9/2018--treated with keflex and doxy added eventually. - cephALEXin (KEFLEX) 500 mg capsule; Take 1 Cap by mouth four (4) times daily for 10 days. Dispense: 40 Cap; Refill: 0    Follow-up Disposition:  Return if symptoms worsen or fail to improve.     I have discussed the diagnosis with the patient and the intended plan as seen in the above orders. Patient verbalized understanding of the plan and agrees with the plan. The patient has received an after-visit summary and questions were answered concerning future plans. I have discussed medication side effects and warnings with the patient as well. Informed patient to return to the office if new symptoms arise.     Patient was seen and discussed with Dr. Mattie Robert MD  Family Medicine Resident

## 2020-07-07 DIAGNOSIS — S31.109S OPEN WOUND OF ANTERIOR ABDOMINAL WALL, SEQUELA: ICD-10-CM

## 2020-07-07 RX ORDER — MUPIROCIN 20 MG/G
OINTMENT TOPICAL
Qty: 22 G | Refills: 0 | Status: SHIPPED | OUTPATIENT
Start: 2020-07-07 | End: 2020-09-09

## 2020-08-24 RX ORDER — NYSTATIN 100000 [USP'U]/G
POWDER TOPICAL
Qty: 240 G | Refills: 3 | Status: SHIPPED | OUTPATIENT
Start: 2020-08-24 | End: 2021-01-29

## 2020-09-09 DIAGNOSIS — S31.109S OPEN WOUND OF ANTERIOR ABDOMINAL WALL, SEQUELA: ICD-10-CM

## 2020-09-09 RX ORDER — IMIPRAMINE HYDROCHLORIDE 25 MG/1
TABLET ORAL
Qty: 270 TAB | Refills: 0 | Status: SHIPPED | OUTPATIENT
Start: 2020-09-09

## 2020-09-09 RX ORDER — MUPIROCIN 20 MG/G
OINTMENT TOPICAL
Qty: 22 G | Refills: 0 | Status: SHIPPED | OUTPATIENT
Start: 2020-09-09 | End: 2020-10-04

## 2020-09-23 DIAGNOSIS — K21.9 GASTROESOPHAGEAL REFLUX DISEASE WITHOUT ESOPHAGITIS: ICD-10-CM

## 2020-09-24 RX ORDER — PANTOPRAZOLE SODIUM 40 MG/1
TABLET, DELAYED RELEASE ORAL
Qty: 90 TAB | Refills: 3 | Status: SHIPPED | OUTPATIENT
Start: 2020-09-24 | End: 2022-06-21

## 2020-10-04 DIAGNOSIS — S31.109S OPEN WOUND OF ANTERIOR ABDOMINAL WALL, SEQUELA: ICD-10-CM

## 2020-10-04 RX ORDER — MUPIROCIN 20 MG/G
OINTMENT TOPICAL
Qty: 22 G | Refills: 0 | Status: SHIPPED | OUTPATIENT
Start: 2020-10-04 | End: 2020-11-17 | Stop reason: SDUPTHER

## 2020-11-13 DIAGNOSIS — S31.109S OPEN WOUND OF ANTERIOR ABDOMINAL WALL, SEQUELA: ICD-10-CM

## 2020-11-13 RX ORDER — MUPIROCIN 20 MG/G
OINTMENT TOPICAL
Qty: 22 G | Refills: 0 | OUTPATIENT
Start: 2020-11-13

## 2020-11-13 NOTE — TELEPHONE ENCOUNTER
Good afternoon ,  I received a message stating that the patient is requesting a refill for:    mupirocin (BACTROBAN) 2 % ointment   To be sent to the pharmacy. Can you help me with this please?     Thank you

## 2020-11-17 RX ORDER — MUPIROCIN 20 MG/G
OINTMENT TOPICAL
Qty: 22 G | Refills: 1 | Status: SHIPPED | OUTPATIENT
Start: 2020-11-17 | End: 2022-06-08

## 2021-01-29 RX ORDER — NYSTATIN 100000 [USP'U]/G
POWDER TOPICAL
Qty: 240 G | Refills: 3 | Status: SHIPPED | OUTPATIENT
Start: 2021-01-29 | End: 2021-11-10

## 2021-03-16 NOTE — TELEPHONE ENCOUNTER
----- Message from Dneise Anand sent at 2/6/2018  8:10 AM EST -----  Regarding: Dr Fischer Score  Pt would like to know if her 2nd form she drop off ready for pick-up, please call pt at [phone]. Niacinamide Counseling: I recommended taking niacin or niacinamide, also know as vitamin B3, twice daily. Recent evidence suggests that taking vitamin B3 (500 mg twice daily) can reduce the risk of actinic keratoses and non-melanoma skin cancers. Side effects of vitamin B3 include flushing and headache.

## 2021-09-09 ENCOUNTER — OFFICE VISIT (OUTPATIENT)
Dept: FAMILY MEDICINE CLINIC | Age: 36
End: 2021-09-09
Payer: MEDICARE

## 2021-09-09 VITALS
RESPIRATION RATE: 18 BRPM | HEIGHT: 55 IN | BODY MASS INDEX: 67.81 KG/M2 | OXYGEN SATURATION: 95 % | HEART RATE: 105 BPM | TEMPERATURE: 97.1 F | WEIGHT: 293 LBS

## 2021-09-09 DIAGNOSIS — I83.029 VENOUS STASIS ULCER OF LEFT LOWER EXTREMITY (HCC): Primary | ICD-10-CM

## 2021-09-09 DIAGNOSIS — L97.929 VENOUS STASIS ULCER OF LEFT LOWER EXTREMITY (HCC): Primary | ICD-10-CM

## 2021-09-09 PROCEDURE — G0463 HOSPITAL OUTPT CLINIC VISIT: HCPCS | Performed by: STUDENT IN AN ORGANIZED HEALTH CARE EDUCATION/TRAINING PROGRAM

## 2021-09-09 PROCEDURE — G8428 CUR MEDS NOT DOCUMENT: HCPCS | Performed by: STUDENT IN AN ORGANIZED HEALTH CARE EDUCATION/TRAINING PROGRAM

## 2021-09-09 PROCEDURE — 99212 OFFICE O/P EST SF 10 MIN: CPT | Performed by: STUDENT IN AN ORGANIZED HEALTH CARE EDUCATION/TRAINING PROGRAM

## 2021-09-09 PROCEDURE — G8756 NO BP MEASURE DOC: HCPCS | Performed by: STUDENT IN AN ORGANIZED HEALTH CARE EDUCATION/TRAINING PROGRAM

## 2021-09-09 PROCEDURE — G8417 CALC BMI ABV UP PARAM F/U: HCPCS | Performed by: STUDENT IN AN ORGANIZED HEALTH CARE EDUCATION/TRAINING PROGRAM

## 2021-09-09 PROCEDURE — G8432 DEP SCR NOT DOC, RNG: HCPCS | Performed by: STUDENT IN AN ORGANIZED HEALTH CARE EDUCATION/TRAINING PROGRAM

## 2021-09-09 RX ORDER — ARIPIPRAZOLE 5 MG/1
10 TABLET ORAL DAILY
COMMUNITY
End: 2022-06-30

## 2021-09-09 NOTE — PROGRESS NOTES
Jena Moore is a 39 y.o. female who presents for chronic ulcer of left leg. Located on the calf. Has been there for 3 months. Now worsening, however has not improved with topical bacitracin and clindamycin. No drainage. No fever. No there lesions present. Caretaker denies any insect bite that she knows of. Tender to palpation, no pruritis. No history of staph infection. Prior wound culture grew proteus mirabilis. Patient does have lymphedema, no longer treated at the clinic due to transportation issues. Past Medical History  Past Medical History:   Diagnosis Date    Anxiety disorder     Severe    Cellulitis, abdominal wall 5/18/2018    Congestive heart failure, unspecified     Diabetes ([facility] [address]     Hypertension     Ill-defined condition     lymphedema    Lymph edema     Obesity     Open wound of abdominal wall with complication 6/97/0565    Prader - Cecile Miyamoto syndrome     Sleep apnea     cpap       Current Medications  Current Outpatient Medications   Medication Sig Dispense Refill    ARIPiprazole (Abilify) 5 mg tablet Take  by mouth daily.  nystatin (MYCOSTATIN) powder APPLY TO THE AFFECTED AREA FOUR TIMES DAILY 240 g 3    mupirocin (BACTROBAN) 2 % ointment Apply to affected area twice daily as needed 22 g 1    pantoprazole (PROTONIX) 40 mg tablet TAKE 1 TABLET BY MOUTH DAILY 90 Tab 3    imipramine (TOFRANIL) 25 mg tablet TAKE 3 TABLETS BY MOUTH EVERY NIGHT AT BEDTIME 270 Tab 0    ammonium lactate (AMLACTIN) 12 % topical cream APPLY TO THE AFFECTED AREA AND RUB IN WELL TWICE DAILY=====DISPENSE PUMP ONLY 385 g 3    ALPRAZolam (XANAX) 0.25 mg tablet TAKE 1 TABLET BY MOUTH THREE TIMES DAILY AS NEEDED FOR ANXIETY 90 Tab 0    simvastatin (ZOCOR) 40 mg tablet Take 40 mg by mouth daily.  clotrimazole (GYNE-LOTRIMIN) 2 % vaginal cream Insert 1 Applicatorful into vagina nightly. 1 Tube 0    erythromycin (ILOTYCIN) ophthalmic ointment Administer  to both eyes nightly.       risperiDONE (RISPERDAL) 1 mg tablet Take 2 Tabs by mouth two (2) times a day. Indications: psychosis (Patient taking differently: Take 3 mg by mouth two (2) times a day. Indications: psychosis) 360 Tab 3    insulin aspart U-100 (NOVOLOG) 100 unit/mL inpn 40 Units by SubCUTAneous route Before breakfast, lunch, dinner and at bedtime. Sliding Scale      Adhesive Bandage 6 X 8 \" bndg 1 Each by Apply Externally route daily. Mepilex Transfers 6x8 inches. T14.8, 2x2cm open area with moderate drainage 1mm deep. 1 box. 1 Each 2    furosemide (LASIX) 40 mg tablet Take 40 mg by mouth daily.  naftifine (NAFTIN) 1 % topical cream APPLY EXTERNALLY TO THE AFFECTED AREA EVERY DAY FOR 2 WEEKS OR AS DIRECTED 60 g 0    albuterol (PROVENTIL HFA, VENTOLIN HFA, PROAIR HFA) 90 mcg/actuation inhaler Take 1 Puff by inhalation every four (4) hours as needed for Wheezing. Dispense 8g inhaler. 1 Inhaler 0    silver sulfADIAZINE (SILVADENE) 1 % topical cream Apply  to affected area daily. Apply daily as directed (Patient taking differently: Apply  to affected area as needed. Apply daily as directed) 85 g 1    insulin glargine (LANTUS,BASAGLAR) 100 unit/mL (3 mL) inpn by SubCUTAneous route two (2) times a day. 80mg am and 80mg pm.      furosemide (LASIX) 20 mg tablet TAKE 1 TABLET BY MOUTH EVERY DAY 90 Tab 0    hydrOXYzine (VISTARIL) 25 mg capsule TAKE 1 CAPSULE BY MOUTH THREE TIMES DAILY AS NEEDED FOR ITCHING 30 Cap 0    fluticasone (FLONASE) 50 mcg/actuation nasal spray 2 Sprays by Both Nostrils route daily.  (Patient taking differently: 2 Sprays by Both Nostrils route two (2) times a day.) 1 Bottle 6    fenofibrate nanocrystallized (TRICOR) 145 mg tablet TAKE 1 TABLET BY MOUTH DAILY 90 Tab 0    nystatin-triamcinolone (MYCOLOG II) topical cream APPLY EXTERNALLY TO THE AFFECTED AREA TWICE DAILY (Patient not taking: Reported on 9/9/2021) 240 g 3    doxycycline (ADOXA) 100 mg tablet TAKE 1 TABLET BY MOUTH EVERY DAY (Patient not taking: Reported on 9/9/2021) 30 Tab 0       Allergies  Allergies   Allergen Reactions    Bactrim [Sulfamethoprim Ds] Other (comments)     Nausea, abdominal pain and cramping      Byetta [Exenatide] Rash       Social History   Social History     Socioeconomic History    Marital status: SINGLE     Spouse name: Not on file    Number of children: Not on file    Years of education: Not on file    Highest education level: Not on file   Occupational History    Not on file   Tobacco Use    Smoking status: Never Smoker    Smokeless tobacco: Never Used   Substance and Sexual Activity    Alcohol use: No     Alcohol/week: 0.0 standard drinks    Drug use: No    Sexual activity: Never   Other Topics Concern    Not on file   Social History Narrative    Not on file     Social Determinants of Health     Financial Resource Strain:     Difficulty of Paying Living Expenses:    Food Insecurity:     Worried About Running Out of Food in the Last Year:     [address] in the Last Year:    Transportation Needs:     Lack of Transportation (Medical):      Lack of Transportation (Non-Medical):    Physical Activity:     Days of Exercise per Week:     Minutes of Exercise per Session:    Stress:     Feeling of Stress :    Social Connections:     Frequency of Communication with Friends and Family:     Frequency of Social Gatherings with Friends and Family:     Attends [employer] Services:     Active Member of Clubs or Organizations:     Attends Club or Organization Meetings:     Marital Status:    Intimate Partner Violence:     Fear of Current or Ex-Partner:     Emotionally Abused:     Physically Abused:     Sexually Abused:        Family History  Family History   Problem Relation Age of Onset    Hypertension Mother     Hypertension Father     MS Brother        Objective   Vital Signs  Visit Vitals  Pulse (!) 105   Temp 97.1 °F (36.2 °C) (Temporal)   Resp 18   Ht 4' 7\" (1.397 m)   Wt 315 lb (142.9 kg)   SpO2 95%   BMI 73.21 kg/m²       Physical Examination  Physical Exam  Constitutional:       Appearance: She is obese. Cardiovascular:      Pulses: Normal pulses. Musculoskeletal:      Right lower leg: Edema (nonpitting) present. Left lower leg: Edema (nonpitting) present. Skin:     Findings: Lesion present. Comments: 1-2cm chronic ulcer stage 1 with surrounding induration and purplish discoloration. Does not appear infected. No drainage. Tender to palpation. Varicose veins surrounding ulcer. Neurological:      Mental Status: She is alert. Assessment   Tyrel Salazar is a 39 y.o. who is here for chronic venous ulcer. Plan   Diagnoses and all orders for this visit:    1. Venous stasis ulcer of left lower extremity ([address]  -     REFERRAL TO WOUND CARE       Patient is counseled to return to the office if symptoms do not improve as expected. Urgent consultation with the nearest Emergency Department is strongly recommended if condition worsens. Patient is counseled to follow up as recommended and to inform the office if any changes in treatment are recommended.       I discussed this patient with Dr. Bella Welch (Attending Physician)       Signed By:  Peri Torres DO    Family Medicine Resident

## 2021-09-09 NOTE — PATIENT INSTRUCTIONS
[facility]  [address] [place]  [address]  [place], [address] [place] [address] Drive  Get Directions  Tel: [phone]  Fax: [phone]

## 2021-09-09 NOTE — PROGRESS NOTES
Chief Complaint   Patient presents with    Skin Ulcer     1. Have you been to the ER, urgent care clinic since your last visit? Hospitalized since your last visit? No    2. Have you seen or consulted any other health care providers outside of the [address] [place] [address] since your last visit? Include any pap smears or colon screening.  No

## 2021-09-15 ENCOUNTER — HOSPITAL ENCOUNTER (OUTPATIENT)
Dept: WOUND CARE | Age: 36
Discharge: HOME | End: 2021-09-15
Payer: MEDICARE

## 2021-09-15 VITALS
OXYGEN SATURATION: 97 % | WEIGHT: 293 LBS | BODY MASS INDEX: 67.81 KG/M2 | TEMPERATURE: 98.1 F | HEIGHT: 55 IN | RESPIRATION RATE: 16 BRPM | HEART RATE: 98 BPM

## 2021-09-15 PROBLEM — L97.921: Status: ACTIVE | Noted: 2021-09-15

## 2021-09-15 PROCEDURE — 99203 OFFICE O/P NEW LOW 30 MIN: CPT

## 2021-09-15 PROCEDURE — 74011000250 HC RX REV CODE- 250: Performed by: EMERGENCY MEDICINE

## 2021-09-15 RX ADMIN — Medication: at 09:29

## 2021-09-15 NOTE — WOUND CARE
09/15/21 0925   Right Leg Edema Point of Measurement   Leg circumference 63 cm   Ankle circumference 51 cm   Left Leg Edema Point of Measurement   Leg circumference 63 cm   Ankle circumference 52 cm   LLE Peripheral Vascular    Capillary Refill Less than/equal to 3 seconds   Color Appropriate for race   Temperature Cool   Sensation Present   Pedal Pulse Palpable;Doppler   RLE Peripheral Vascular    Capillary Refill Less than/equal to 3 seconds   Color Appropriate for race   Temperature Cool   Sensation Present   Pedal Pulse Palpable;Doppler   Wound Leg lower Left;Lateral #1   Date First Assessed/Time First Assessed: 09/15/21 0925   Present on Hospital Admission: Yes  Location: Leg lower  Wound Location Orientation: Left;Lateral  Wound Description: #1   Wound Image    Wound Length (cm) 0.2 cm   Wound Width (cm) 0.2 cm   Wound Depth (cm) 0.1 cm   Wound Surface Area (cm^2) 0.04 cm^2   Wound Volume (cm^3) 0.004 cm^3   Wound Assessment Epithelialization  (dried exudate)   Drainage Amount Scant   Drainage Description Serous   Wound Odor None   Bea-Wound/Incision Assessment Intact     Visit Vitals  Pulse 98   Temp 98.1 °F (36.7 °C)   Resp 16   Ht 4' 7\" (1.397 m)   Wt 142.4 kg (314 lb)   SpO2 97%   BMI 72.98 kg/m²

## 2021-09-15 NOTE — DISCHARGE INSTRUCTIONS
Discharge Instructions for  [facility]  [address] [place], [address] [address]  Telephone: [phone] [address] Information: Should you experience any significant changes in your wound(s) or have questions about your wound care, please contact the [address] [facility] - Thursday 8:00 am - 4:30. Marny Borne 8:00 am - 12:30pm.  If you need help with your wound outside these hours and cannot wait until we are again available, contact your PCP or go to the hospital emergency room. PLEASE NOTE: IF YOU ARE UNABLE TO OBTAIN WOUND SUPPLIES, CONTINUE TO USE THE SUPPLIES YOU HAVE AVAILABLE UNTIL YOU ARE ABLE TO REACH US. IT IS MOST IMPORTANT TO KEEP THE WOUND COVERED AT ALL TIMES. NAME:  Jordin Barragan  YOB: 1985  DATE:  9/15/2021      Keep area covered for protection     Return Appointment:    [x] Return Appointment: With Dr. Burnette Scheuermann  in other: as needed *    [] Wound assessment with Nurse          Physician Signature:__________________________________    Dr. Alessandra Weaver treatment has been completed at [facility] outpatient wound clinic on 9/15/2021  , per Dr. Murtaza Patel. We know patients have several options when choosing a health care provider. We would like to express our sincere appreciation for having had the chance to be yours. More importantly, we enjoy having you as part of our family. We also hope your experience with us has surpassed your expectations and that you have been pleased with our service. We appreciate the confidence you've shown in selecting us as your health care provider and we will continue or commitment to provide the highest quality of service. Again, thank you for choosing us for your health care needs. If you have any further questions or concerns, please call [phone].  It has been our pleasure serving you and we hope to continue our relationship in the future, if the need occurs.        Sincerely,    [facility] Team

## 2021-09-15 NOTE — PROGRESS NOTES
Past Medical History:   Diagnosis Date    Anxiety disorder     Severe    Cellulitis, abdominal wall 5/18/2018    Congestive heart failure, unspecified     Diabetes ([facility] [address]     Hypertension     Ill-defined condition     lymphedema    Lymph edema     Obesity     Open wound of abdominal wall with complication 6/85/8389    Prader - Barbara Esvin syndrome     Sleep apnea     cpap       Past Surgical History:   Procedure Laterality Date    HX CHOLECYSTECTOMY  age 25   Binh Guest HEENT      HX TONSIL AND ADENOIDECTOMY  age 10    HX WISDOM TEETH EXTRACTION  10/1/13       Family History   Problem Relation Age of Onset    Hypertension Mother     Hypertension Father     MS Brother        Social History     Tobacco Use    Smoking status: Never Smoker    Smokeless tobacco: Never Used   Substance Use Topics    Alcohol use: No     Alcohol/week: 0.0 standard drinks    Drug use: No       Allergies   Allergen Reactions    Bactrim [Sulfamethoprim Ds] Other (comments)     Nausea, abdominal pain and cramping      Byetta [Exenatide] Rash       Current Outpatient Medications on File Prior to Encounter   Medication Sig Dispense Refill    ARIPiprazole (Abilify) 5 mg tablet Take  by mouth daily.  nystatin (MYCOSTATIN) powder APPLY TO THE AFFECTED AREA FOUR TIMES DAILY 240 g 3    mupirocin (BACTROBAN) 2 % ointment Apply to affected area twice daily as needed 22 g 1    pantoprazole (PROTONIX) 40 mg tablet TAKE 1 TABLET BY MOUTH DAILY 90 Tab 3    imipramine (TOFRANIL) 25 mg tablet TAKE 3 TABLETS BY MOUTH EVERY NIGHT AT BEDTIME 270 Tab 0    ammonium lactate (AMLACTIN) 12 % topical cream APPLY TO THE AFFECTED AREA AND RUB IN WELL TWICE DAILY=====DISPENSE PUMP ONLY 385 g 3    ALPRAZolam (XANAX) 0.25 mg tablet TAKE 1 TABLET BY MOUTH THREE TIMES DAILY AS NEEDED FOR ANXIETY 90 Tab 0    simvastatin (ZOCOR) 40 mg tablet Take 40 mg by mouth daily.       clotrimazole (GYNE-LOTRIMIN) 2 % vaginal cream Insert 1 Applicatorful into vagina nightly. 1 Tube 0    erythromycin (ILOTYCIN) ophthalmic ointment Administer  to both eyes nightly.  nystatin-triamcinolone (MYCOLOG II) topical cream APPLY EXTERNALLY TO THE AFFECTED AREA TWICE DAILY (Patient not taking: Reported on 9/9/2021) 240 g 3    risperiDONE (RISPERDAL) 1 mg tablet Take 2 Tabs by mouth two (2) times a day. Indications: psychosis (Patient taking differently: Take 3 mg by mouth two (2) times a day. Indications: psychosis) 360 Tab 3    insulin aspart U-100 (NOVOLOG) 100 unit/mL inpn 40 Units by SubCUTAneous route Before breakfast, lunch, dinner and at bedtime. Sliding Scale      Adhesive Bandage 6 X 8 \" bndg 1 Each by Apply Externally route daily. Mepilex Transfers 6x8 inches. T14.8, 2x2cm open area with moderate drainage 1mm deep. 1 box. 1 Each 2    furosemide (LASIX) 40 mg tablet Take 40 mg by mouth daily.  naftifine (NAFTIN) 1 % topical cream APPLY EXTERNALLY TO THE AFFECTED AREA EVERY DAY FOR 2 WEEKS OR AS DIRECTED 60 g 0    albuterol (PROVENTIL HFA, VENTOLIN HFA, PROAIR HFA) 90 mcg/actuation inhaler Take 1 Puff by inhalation every four (4) hours as needed for Wheezing. Dispense 8g inhaler. 1 Inhaler 0    silver sulfADIAZINE (SILVADENE) 1 % topical cream Apply  to affected area daily. Apply daily as directed (Patient taking differently: Apply  to affected area as needed. Apply daily as directed) 85 g 1    insulin glargine (LANTUS,BASAGLAR) 100 unit/mL (3 mL) inpn by SubCUTAneous route two (2) times a day. 80mg am and 80mg pm.      furosemide (LASIX) 20 mg tablet TAKE 1 TABLET BY MOUTH EVERY DAY 90 Tab 0    hydrOXYzine (VISTARIL) 25 mg capsule TAKE 1 CAPSULE BY MOUTH THREE TIMES DAILY AS NEEDED FOR ITCHING 30 Cap 0    fluticasone (FLONASE) 50 mcg/actuation nasal spray 2 Sprays by Both Nostrils route daily.  (Patient taking differently: 2 Sprays by Both Nostrils route two (2) times a day.) 1 Bottle 6    fenofibrate nanocrystallized (TRICOR) 145 mg tablet TAKE 1 TABLET BY MOUTH DAILY 90 Tab 0     No current facility-administered medications on file prior to encounter. No components found for: LABA1C    . Catrachita Mena   Registered Nurse   Wound Care   Wound Care      Signed   Date of Service:  09/15/21 0930                    []Hide copied text    []Jean-Claude for details       09/15/21 0925   Right Leg Edema Point of Measurement   Leg circumference 63 cm   Ankle circumference 51 cm   Left Leg Edema Point of Measurement   Leg circumference 63 cm   Ankle circumference 52 cm   LLE Peripheral Vascular    Capillary Refill Less than/equal to 3 seconds   Color Appropriate for race   Temperature Cool   Sensation Present   Pedal Pulse Palpable;Doppler   RLE Peripheral Vascular    Capillary Refill Less than/equal to 3 seconds   Color Appropriate for race   Temperature Cool   Sensation Present   Pedal Pulse Palpable;Doppler   Wound Leg lower Left;Lateral #1   Date First Assessed/Time First Assessed: 09/15/21 0925   Present on Hospital Admission: Yes  Location: Leg lower  Wound Location Orientation: Left;Lateral  Wound Description: #1   Wound Image    Wound Length (cm) 0.2 cm   Wound Width (cm) 0.2 cm   Wound Depth (cm) 0.1 cm   Wound Surface Area (cm^2) 0.04 cm^2   Wound Volume (cm^3) 0.004 cm^3   Wound Assessment Epithelialization  (dried exudate)   Drainage Amount Scant   Drainage Description Serous   Wound Odor None   Bea-Wound/Incision Assessment Intact      Visit Vitals  Pulse 98   Temp 98.1 °F (36.7 °C)   Resp 16   Ht 4' 7\" (1.397 m)   Wt 142.4 kg (314 lb)   SpO2 97%   BMI 72.98 kg/m²                   Chief Complaint (CC): non healing wound LLE. 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 Sustained a wound to LLE lateral mid calf from ? Cause. This has not been healing. Concern was raised of there being something under the skin weeks ago but no longer.   Pertinent Past History (PMedHx): as above, with Hitesh, MR, lymphedema, . Also noted:                         Medications and Allergies: as per [facility]. I have reviewed and concur. Illnesses: as per '[facility]' recorded data noted today. Surgeries and Injuries: as per '[facility]' recorded data noted today. noted. Review of Systems (ROS):                        Integumentary: Other than as noted in 'PI'; skin hair and nails normal for age, with no new rash, lumps, bumps, eruptions or bleeding. Lymph: no new prominent nodes or drainage near lymph nodes. Bones, Joints, and Muscles: Other than as noted in 'PI' no new fractures, dislocations, weakness or pain. .                        Hematopoietic: no new bleeding or bruising or anemia changes. .                        Eyes: no recent trauma or inflammation. 0. Eye glasses. 0. Intra Occular Lens Implants (IOLI)                        Ears: Hearing is unchanged and usually good, . Nose: no new drainage, rhinorhea or epistaxis. Mouth, and throat: no soreness, drainage or lesions. no. Dentures. Neck: no new masses, drainage or pain                        Respiratory; no hemoptysis, current shortness of breath or pain with breath. Cardiovascular: No chest pain, palpitation or tachycardia. .                        Gastrointestinal: no recent change in appetite, stools or food tolerance. No jaundice, hematemesis, vomiting or diarrhea                        Genito-Urinary: urine color, frequency, sensation unchanged                        Neurologic: no syncope, dizzyness or unusual sensations. Psychologic and Mental Status: no change in mood, sleep or memory recently     Social History: '[facility]' data today is noted. Lives at home with mom who cares for her.   Family History: '[facility]' data today is noted. Rizwan Thurston Physical Exam:      General:  alert smiling and greeting me. NAD, very high BMI, increased since my last seeing her. Head, Eyes, Ears, Nose and Throat: normocephalic, PERRLA EOMI, TMs, mucosa benign. Neck: supple without masses or adenopathy. No bruit. Chest: full excursion without deformity, . Lungs: clear to ausc. Heart: RSR no M. Abdomen: soft round protuberant. Neurology: Cranial nerves 2-12 grossly intact . Reflexes: BJ, CBJ, KJ, AJ 1+ and =.  Vascular:                         Pulses:                                            R                    L                                               Radial                        +.                 +.                                              Femoral                     +.                 +.                                              DP                             +.                 +.                                              PT                             +.                 +.  Extremities: noteable diffuse lymph edema but more pronounced 'saddle bag' type adiposity down to ankle/wrist/base of skull. R dorsal distal forearm, L jerardo lateral distal calf with discolored scaling irregular patches. LLE lateral mid siva wound with adherent dry crust, after removal, no open ulcer but skin here is thicker, non tender, dry. Other: alert cooperative, not anxious. Vital signs and data recorded in '[facility]' for this patient today is noted, reviewed and considered. Patient notes today: no pain. Post Op Diagnosis, and condition: now healed chronic wound. Follow Up Plan: RTC as needed. Rizwan Thurston Specimens: 0. Counseled regarding/Discussed: as per above. Clinical considerations: alert to avoid further trauma, discussion regarding this wound in a 'dependant' area. Prognosis: with attention could avoid new injury. Dx Codes: H02.080.

## 2021-10-06 DIAGNOSIS — R23.8 SKIN IRRITATION: ICD-10-CM

## 2021-10-06 RX ORDER — AMMONIUM LACTATE 12 G/100G
CREAM TOPICAL
Qty: 385 G | Refills: 3 | Status: SHIPPED | OUTPATIENT
Start: 2021-10-06 | End: 2022-02-10 | Stop reason: SDUPTHER

## 2021-10-06 RX ORDER — TRIAMCINOLONE ACETONIDE 1 MG/G
CREAM TOPICAL
Qty: 240 G | Refills: 0 | Status: SHIPPED | OUTPATIENT
Start: 2021-10-06 | End: 2021-11-18 | Stop reason: SDUPTHER

## 2021-10-06 RX ORDER — NYSTATIN 100000 U/G
CREAM TOPICAL
Qty: 240 G | Refills: 0 | Status: SHIPPED | OUTPATIENT
Start: 2021-10-06 | End: 2021-11-22

## 2021-10-31 ENCOUNTER — APPOINTMENT (OUTPATIENT)
Dept: GENERAL RADIOLOGY | Age: 36
End: 2021-10-31
Attending: STUDENT IN AN ORGANIZED HEALTH CARE EDUCATION/TRAINING PROGRAM
Payer: MEDICARE

## 2021-10-31 ENCOUNTER — HOSPITAL ENCOUNTER (EMERGENCY)
Age: 36
Discharge: HOME | End: 2021-10-31
Attending: STUDENT IN AN ORGANIZED HEALTH CARE EDUCATION/TRAINING PROGRAM
Payer: MEDICARE

## 2021-10-31 VITALS
WEIGHT: 293 LBS | BODY MASS INDEX: 67.81 KG/M2 | RESPIRATION RATE: 20 BRPM | TEMPERATURE: 97.8 F | OXYGEN SATURATION: 98 % | HEART RATE: 88 BPM | HEIGHT: 55 IN

## 2021-10-31 DIAGNOSIS — L02.416 CUTANEOUS ABSCESS OF LEFT LOWER EXTREMITY: Primary | ICD-10-CM

## 2021-10-31 PROCEDURE — 99281 EMR DPT VST MAYX REQ PHY/QHP: CPT

## 2021-10-31 NOTE — ED PROVIDER NOTES
Susan Figueroa is a 39 y.o. female with past medical history notable for Prader-Willi syndrome, recurrent cellulitis, lymphedema presenting with concern for an open wound on her left lower extremity. Brought by her parents who are her guardians. Mom noticed 2 bumps on the lateral aspect of her lower leg about a week ago, yesterday one opened and expressed purulent material. She has not had fevers, chills, change in appetite/behavior. She has not had confusion or listlessness. Seen by [facility], referred to the ED for evaluation.              Past Medical History:   Diagnosis Date    Anxiety disorder     Severe    Cellulitis, abdominal wall 5/18/2018    Congestive heart failure, unspecified     Diabetes ([facility] [address]     Hypertension     Ill-defined condition     lymphedema    Lymph edema     Obesity     Open wound of abdominal wall with complication 7/73/5252    Prader - [place] Ill syndrome     Sleep apnea     cpap       Past Surgical History:   Procedure Laterality Date    HX CHOLECYSTECTOMY  age 25   [de-identified] HEENT      HX TONSIL AND ADENOIDECTOMY  age 10    HX WISDOM TEETH EXTRACTION  10/1/13         Family History:   Problem Relation Age of Onset    Hypertension Mother     Hypertension Father     MS Brother        Social History     Socioeconomic History    Marital status: SINGLE     Spouse name: Not on file    Number of children: Not on file    Years of education: Not on file    Highest education level: Not on file   Occupational History    Not on file   Tobacco Use    Smoking status: Never Smoker    Smokeless tobacco: Never Used   Substance and Sexual Activity    Alcohol use: No     Alcohol/week: 0.0 standard drinks    Drug use: No    Sexual activity: Never   Other Topics Concern     Service Not Asked    Blood Transfusions Not Asked    Caffeine Concern Not Asked    Occupational Exposure Not Asked    Hobby Hazards Not Asked    Sleep Concern Not Asked    Stress Concern Not Asked    Weight Concern Not Asked    Special Diet Not Asked    Back Care Not Asked    Exercise Not Asked    Bike Helmet Not Asked   [address] Not Asked    Self-Exams Not Asked   Social History Narrative    Not on file     Social Determinants of Health     Financial Resource Strain:     Difficulty of Paying Living Expenses:    Food Insecurity:     Worried About Running Out of Food in the Last Year:     Ran Out of Food in the Last Year:    Transportation Needs:     Lack of Transportation (Medical):  Lack of Transportation (Non-Medical):    Physical Activity:     Days of Exercise per Week:     Minutes of Exercise per Session:    Stress:     Feeling of Stress :    Social Connections:     Frequency of Communication with Friends and Family:     Frequency of Social Gatherings with Friends and Family:     Attends Yazidism Services:     Active Member of Clubs or Organizations:     Attends Club or Organization Meetings:     Marital Status:    Intimate Partner Violence:     Fear of Current or Ex-Partner:     Emotionally Abused:     Physically Abused:     Sexually Abused: ALLERGIES: Bactrim [sulfamethoprim ds] and Byetta [exenatide]    Review of Systems   Constitutional: Negative for fatigue and fever. Eyes: Negative for photophobia. Respiratory: Negative for cough and shortness of breath. Cardiovascular: Negative for chest pain. Gastrointestinal: Negative for abdominal pain, diarrhea, nausea and vomiting. Endocrine: Negative for polyphagia. Genitourinary: Negative for dysuria and enuresis. Musculoskeletal: Negative for back pain. Neurological: Negative for seizures, numbness and headaches. Psychiatric/Behavioral: Negative for confusion. All other systems reviewed and are negative.       Vitals:    10/31/21 1318   Pulse: 88   Resp: 20   Temp: 97.8 °F (36.6 °C)   SpO2: 98%   Weight: 144.8 kg (319 lb 3.6 oz)   Height: 4' 7\" (1.397 m)            Physical Exam  Constitutional: General: She is not in acute distress. Appearance: She is obese. She is not toxic-appearing. HENT:      Head: Normocephalic and atraumatic. Mouth/Throat:      Mouth: Mucous membranes are moist.   Eyes:      Extraocular Movements: Extraocular movements intact. Cardiovascular:      Rate and Rhythm: Normal rate and regular rhythm. Heart sounds: Normal heart sounds. Pulmonary:      Effort: Pulmonary effort is normal. No respiratory distress. Breath sounds: Normal breath sounds. Abdominal:      Palpations: Abdomen is soft. Tenderness: There is no abdominal tenderness. Musculoskeletal:      Cervical back: Normal range of motion. Right lower leg: No edema. Left lower leg: No edema. Skin:     Capillary Refill: Capillary refill takes less than 2 seconds. Neurological:      General: No focal deficit present. Mental Status: She is alert and oriented to person, place, and time. Psychiatric:         Mood and Affect: Mood normal.          MDM        Patient without systemic signs of illness, there are 2 small abscesses without surrounding cellulitis. She is already prescribed doxycycline. She is ambulatory at baseline mental status. Will be discharged with continued care and follow-up with primary care.     Procedures

## 2021-10-31 NOTE — ED TRIAGE NOTES
Patient arrives with parents to triage. Per mother, patient is special needs. Per mother, patient has had left lower leg wound x 1 week. States progressively worse since last night. Reports SOB, and new onset of skin bruising. Reports bruising to bilateral legs, and right eye. Denies any recent injury. [facility] came to house today, and recommended patient come to ED for evaluation. Hx of lymphedema. Denies having BP taken due to lymphedema. Hx of DVT in left lower leg. Denies chest pain, N/V/D, fever, chills.

## 2021-11-10 RX ORDER — NYSTATIN 100000 [USP'U]/G
POWDER TOPICAL
Qty: 240 G | Refills: 3 | Status: SHIPPED | OUTPATIENT
Start: 2021-11-10 | End: 2021-11-18 | Stop reason: SDUPTHER

## 2021-11-12 ENCOUNTER — PATIENT MESSAGE (OUTPATIENT)
Dept: FAMILY MEDICINE CLINIC | Age: 36
End: 2021-11-12

## 2021-11-12 DIAGNOSIS — R23.8 SKIN IRRITATION: Primary | ICD-10-CM

## 2021-11-12 DIAGNOSIS — H10.409 CHRONIC CONJUNCTIVITIS, UNSPECIFIED CHRONIC CONJUNCTIVITIS TYPE, UNSPECIFIED LATERALITY: ICD-10-CM

## 2021-11-18 RX ORDER — TRIAMCINOLONE ACETONIDE 1 MG/G
CREAM TOPICAL
Qty: 240 G | Refills: 1 | Status: SHIPPED | OUTPATIENT
Start: 2021-11-18 | End: 2022-01-19

## 2021-11-18 RX ORDER — NYSTATIN 100000 [USP'U]/G
POWDER TOPICAL
Qty: 240 G | Refills: 3 | Status: SHIPPED | OUTPATIENT
Start: 2021-11-18

## 2021-11-18 RX ORDER — ERYTHROMYCIN 5 MG/G
OINTMENT OPHTHALMIC
Qty: 3.5 G | Refills: 1 | Status: SHIPPED | OUTPATIENT
Start: 2021-11-18

## 2021-11-18 NOTE — TELEPHONE ENCOUNTER
From: Pat Lebron  To: Jose Lopez MD  Sent: 11/12/2021 2:21 PM EST  Subject: [facility]oshni's Refills for November 2021    Thank you Dr. Merced Dale for refilling [facility]ni's Nystop powder. The [address] on [facility] said they needed new prescriptions for four other refills. They were filled by Dr. Su Pierce before we returned to [address] [place] [address]. Erythromycin Ointment 3.5 grams for Chronic Conjunctivitis PRN  Triamcinolone 0.1% cream 80 mg tube 3 for total of 240 grams to be mixed with Nystop powder and Nystatin cream for 30 day supply  Nystatin cream 100,000 units per gram 8 for a total of 240 grams for 30 day supply. Alprazalam 0.25 mg. 90/3 months  Michelle Zimmerman has an appointment with Dr. Javed Alex next Friday if you want to wait until we see Dr. Javed Alex first.  Thanks.

## 2021-11-19 ENCOUNTER — OFFICE VISIT (OUTPATIENT)
Dept: FAMILY MEDICINE CLINIC | Age: 36
End: 2021-11-19
Payer: MEDICARE

## 2021-11-19 VITALS — WEIGHT: 293 LBS | HEIGHT: 55 IN | BODY MASS INDEX: 67.81 KG/M2

## 2021-11-19 DIAGNOSIS — L97.929 VENOUS STASIS ULCER OF LEFT LOWER EXTREMITY (HCC): Primary | ICD-10-CM

## 2021-11-19 DIAGNOSIS — F41.9 ANXIETY: ICD-10-CM

## 2021-11-19 DIAGNOSIS — I83.029 VENOUS STASIS ULCER OF LEFT LOWER EXTREMITY (HCC): Primary | ICD-10-CM

## 2021-11-19 DIAGNOSIS — Q87.11 PRADER-WILLI SYNDROME: ICD-10-CM

## 2021-11-19 PROCEDURE — G8432 DEP SCR NOT DOC, RNG: HCPCS | Performed by: STUDENT IN AN ORGANIZED HEALTH CARE EDUCATION/TRAINING PROGRAM

## 2021-11-19 PROCEDURE — 99213 OFFICE O/P EST LOW 20 MIN: CPT | Performed by: STUDENT IN AN ORGANIZED HEALTH CARE EDUCATION/TRAINING PROGRAM

## 2021-11-19 PROCEDURE — G8427 DOCREV CUR MEDS BY ELIG CLIN: HCPCS | Performed by: STUDENT IN AN ORGANIZED HEALTH CARE EDUCATION/TRAINING PROGRAM

## 2021-11-19 PROCEDURE — G8756 NO BP MEASURE DOC: HCPCS | Performed by: STUDENT IN AN ORGANIZED HEALTH CARE EDUCATION/TRAINING PROGRAM

## 2021-11-19 PROCEDURE — G8417 CALC BMI ABV UP PARAM F/U: HCPCS | Performed by: STUDENT IN AN ORGANIZED HEALTH CARE EDUCATION/TRAINING PROGRAM

## 2021-11-19 RX ORDER — DOXYCYCLINE 100 MG/1
100 CAPSULE ORAL 2 TIMES DAILY
Qty: 20 CAPSULE | Refills: 0 | Status: SHIPPED | OUTPATIENT
Start: 2021-11-19 | End: 2021-11-29

## 2021-11-19 RX ORDER — ALPRAZOLAM 0.25 MG/1
0.25 TABLET ORAL
Qty: 90 TABLET | Refills: 2 | Status: SHIPPED | OUTPATIENT
Start: 2021-11-19 | End: 2021-11-23 | Stop reason: SDUPTHER

## 2021-11-19 NOTE — PROGRESS NOTES
Chief Complaint   Patient presents with    Leg Swelling     cellitus      Visit Vitals  Ht 4' 7\" (1.397 m)   Wt 317 lb 6.4 oz (144 kg)   BMI 73.77 kg/m²     1. Have you been to the ER, urgent care clinic since your last visit? Hospitalized since your last visit? No    2. Have you seen or consulted any other health care providers outside of the [address] [place] [address] since your last visit? Include any pap smears or colon screening.  No

## 2021-11-19 NOTE — PROGRESS NOTES
Subjective   Cortez Simms is a 39 y.o. female with pertinent PMH of Prader-Willi syndrome, anxiety, and lymphedema who presents for left lower leg cellulitis and one ulceration of 3 weeks duration. Diagnosed initially by dispatch [facility] who placed patient on a 10 day course of doxycycline  Which she finished one week ago. This did decrease the redness and drainage however not totally resolve it. Was seen in the ED for condition per dispatch [facility] recommendation and ED physician did not appreciate significant cellulitis and recommended completing doxycycline course. Denies fever, N/V, confusion, listlessness. Mother would like to help patient lose weight. Planning to start the Monday after thanksgiving. Patients mom was planning to instill a 800 calorie diet per a regimen recommended by a physician in the past.  Mom to help with caloric counting at this time, may want referral to nutritionist down the line. Past Medical History  Past Medical History:   Diagnosis Date    Anxiety disorder     Severe    Cellulitis, abdominal wall 5/18/2018    Congestive heart failure, unspecified     Diabetes ([facility] [address]     Hypertension     Ill-defined condition     lymphedema    Lymph edema     Obesity     Open wound of abdominal wall with complication 5/49/6651    Prader - [place] syndrome     Sleep apnea     cpap       Current Medications  Current Outpatient Medications   Medication Sig Dispense Refill    doxycycline (VIBRAMYCIN) 100 mg capsule Take 1 Capsule by mouth two (2) times a day for 10 days. 20 Capsule 0    ALPRAZolam (XANAX) 0.25 mg tablet Take 1 Tablet by mouth three (3) times daily as needed for Anxiety.  Max Daily Amount: 0.75 mg. 90 Tablet 2    erythromycin (ILOTYCIN) ophthalmic ointment Administer to both eyes nightly as needed 3.5 g 1    triamcinolone acetonide (KENALOG) 0.1 % topical cream MIX WITH NYSTATIN CREAM AND APPLY TO THE AFFECTED AREA TWICE DAILY 240 g 1    nystatin (Nystop) powder APPLY TO THE AFFECTED AREA FOUR TIMES DAILY 240 g 3    nystatin (MYCOSTATIN) topical cream MIX WITH TRIAMCINOLONE CREAM AND APPLY TO THE AFFECTED AREA TWICE DAILY 240 g 0    ammonium lactate (AMLACTIN) 12 % topical cream APPLY TO THE AFFECTED AREA TWICE DAILY 385 g 3    ARIPiprazole (Abilify) 5 mg tablet Take 10 mg by mouth daily. Taking 10 mg      mupirocin (BACTROBAN) 2 % ointment Apply to affected area twice daily as needed 22 g 1    pantoprazole (PROTONIX) 40 mg tablet TAKE 1 TABLET BY MOUTH DAILY 90 Tab 3    imipramine (TOFRANIL) 25 mg tablet TAKE 3 TABLETS BY MOUTH EVERY NIGHT AT BEDTIME 270 Tab 0    simvastatin (ZOCOR) 40 mg tablet Take 40 mg by mouth daily.  clotrimazole (GYNE-LOTRIMIN) 2 % vaginal cream Insert 1 Applicatorful into vagina nightly. 1 Tube 0    nystatin-triamcinolone (MYCOLOG II) topical cream APPLY EXTERNALLY TO THE AFFECTED AREA TWICE DAILY 240 g 3    risperiDONE (RISPERDAL) 1 mg tablet Take 2 Tabs by mouth two (2) times a day. Indications: psychosis (Patient taking differently: Take 3 mg by mouth two (2) times a day. Indications: psychosis) 360 Tab 3    insulin aspart U-100 (NOVOLOG) 100 unit/mL inpn 40 Units by SubCUTAneous route Before breakfast, lunch, dinner and at bedtime. Sliding Scale      Adhesive Bandage 6 X 8 \" bndg 1 Each by Apply Externally route daily. Mepilex Transfers 6x8 inches. T14.8, 2x2cm open area with moderate drainage 1mm deep. 1 box. 1 Each 2    furosemide (LASIX) 40 mg tablet Take 40 mg by mouth daily.  naftifine (NAFTIN) 1 % topical cream APPLY EXTERNALLY TO THE AFFECTED AREA EVERY DAY FOR 2 WEEKS OR AS DIRECTED 60 g 0    albuterol (PROVENTIL HFA, VENTOLIN HFA, PROAIR HFA) 90 mcg/actuation inhaler Take 1 Puff by inhalation every four (4) hours as needed for Wheezing. Dispense 8g inhaler. 1 Inhaler 0    silver sulfADIAZINE (SILVADENE) 1 % topical cream Apply  to affected area daily.  Apply daily as directed (Patient taking differently: Apply  to affected area as needed. Apply daily as directed) 85 g 1    insulin glargine (LANTUS,BASAGLAR) 100 unit/mL (3 mL) inpn by SubCUTAneous route two (2) times a day. 80mg am and 80mg pm.      furosemide (LASIX) 20 mg tablet TAKE 1 TABLET BY MOUTH EVERY DAY 90 Tab 0    hydrOXYzine (VISTARIL) 25 mg capsule TAKE 1 CAPSULE BY MOUTH THREE TIMES DAILY AS NEEDED FOR ITCHING 30 Cap 0    fluticasone (FLONASE) 50 mcg/actuation nasal spray 2 Sprays by Both Nostrils route daily.  (Patient taking differently: 2 Sprays by Both Nostrils route two (2) times a day.) 1 Bottle 6    fenofibrate nanocrystallized (TRICOR) 145 mg tablet TAKE 1 TABLET BY MOUTH DAILY 90 Tab 0       Allergies  Allergies   Allergen Reactions    Bactrim [Sulfamethoprim Ds] Other (comments)     Nausea, abdominal pain and cramping      Byetta [Exenatide] Rash       Social History   Social History     Socioeconomic History    Marital status: SINGLE     Spouse name: Not on file    Number of children: Not on file    Years of education: Not on file    Highest education level: Not on file   Occupational History    Not on file   Tobacco Use    Smoking status: Never Smoker    Smokeless tobacco: Never Used   Substance and Sexual Activity    Alcohol use: No     Alcohol/week: 0.0 standard drinks    Drug use: No    Sexual activity: Never   Other Topics Concern     Service Not Asked    Blood Transfusions Not Asked    Caffeine Concern Not Asked    Occupational Exposure Not Asked    Hobby Hazards Not Asked    Sleep Concern Not Asked    Stress Concern Not Asked    Weight Concern Not Asked    Special Diet Not Asked    Back Care Not Asked    Exercise Not Asked    Bike Helmet Not Asked    Seat Belt Not Asked    Self-Exams Not Asked   Social History Narrative    Not on file     Social Determinants of Health     Financial Resource Strain:     Difficulty of Paying Living Expenses: Not on file   Food Insecurity:     Worried About Running Out of Food in the Last Year: Not on file    Ran Out of Food in the Last Year: Not on file   Transportation Needs:     Lack of Transportation (Medical): Not on file    Lack of Transportation (Non-Medical): Not on file   Physical Activity:     Days of Exercise per Week: Not on file    Minutes of Exercise per Session: Not on file   Stress:     Feeling of Stress : Not on file   Social Connections:     Frequency of Communication with Friends and Family: Not on file    Frequency of Social Gatherings with Friends and Family: Not on file    Attends Judaism Services: Not on file    Active Member of [address] [place] [address] [employer] or Organizations: Not on file    Attends Club or Organization Meetings: Not on file    Marital Status: Not on file   Intimate Partner Violence:     Fear of Current or Ex-Partner: Not on file    Emotionally Abused: Not on file    Physically Abused: Not on file    Sexually Abused: Not on file   Housing Stability:     Unable to Pay for Housing in the Last Year: Not on file    Number of [place] in the Last Year: Not on file    Unstable Housing in the Last Year: Not on file       Family History  Family History   Problem Relation Age of Onset    Hypertension Mother     Hypertension Father     Mult Sclerosis Brother        Objective   Vital Signs  Visit Vitals  Ht 4' 7\" (1.397 m)   Wt 317 lb 6.4 oz (144 kg)   BMI 73.77 kg/m²       Physical Examination  Physical Exam  Skin:     Findings: Wound present. Comments: #1 ulcer: left posterolateral lower leg. Stage 2. Purulent drainage. Minimal erythema and moderate tenderness to palpation. No induration    #2 ulcer: left posterior lower leg. chronic and healing              Assessment   Riki Law is a 39 y.o. who is here for venous stasis ulcer with minimal surrounding cellulitis. Plan   Diagnoses and all orders for this visit:    1. Venous stasis ulcer of left lower extremity (HCC)  -     doxycycline (VIBRAMYCIN) 100 mg capsule;  Take 1 Capsule by mouth two (2) times a day for 10 days. - follow up in 2 weeks to ensure resolution    2. Anxiety  -     Will refill ALPRAZolam (XANAX) 0.25 mg tablet; Take 1 Tablet by mouth three (3) times daily as needed for Anxiety. Max Daily Amount: 0.75 mg.     3. Prader-Willi syndrome  -     REFERRAL TO HOME HEALTH - patients mother requested home health evaluation for PT/OT  - Advised to decrease caloric intake to 1200 calories a day (instead of 800 calories). Patient's mother will message me if she would like a referral to nutrition.    - Possibly interested in appointment with obesity clinic in the new year if no success with calorie restriction independently. Patient is counseled to return to the office if symptoms do not improve as expected. Urgent consultation with the nearest Emergency Department is strongly recommended if condition worsens. Patient is counseled to follow up as recommended and to inform the office if any changes in treatment are recommended.       I discussed this patient with Dr. Armen Esqueda (Attending Physician)       Signed By:  Lizett Kunz DO    Family Medicine Resident

## 2021-11-19 NOTE — PATIENT INSTRUCTIONS
[address],  [address] , [place], [address]  [address]   [address] [facility]  Tel: [phone]

## 2021-11-23 ENCOUNTER — HOME HEALTH ADMISSION (OUTPATIENT)
Dept: HOME HEALTH SERVICES | Facility: HOME HEALTH | Age: 36
End: 2021-11-23
Payer: MEDICARE

## 2021-11-23 ENCOUNTER — TELEPHONE (OUTPATIENT)
Dept: FAMILY MEDICINE CLINIC | Age: 36
End: 2021-11-23

## 2021-11-23 DIAGNOSIS — F41.9 ANXIETY: ICD-10-CM

## 2021-11-23 RX ORDER — ALPRAZOLAM 0.25 MG/1
0.25 TABLET ORAL
Qty: 90 TABLET | Refills: 2 | Status: SHIPPED | OUTPATIENT
Start: 2021-11-23

## 2021-11-23 NOTE — TELEPHONE ENCOUNTER
Received message from patient's caregiver regarding Xanax refill. Resident attempted to fill but did not go through due to [facility] issues. Sent xanax refill and notified caregiver.

## 2021-11-23 NOTE — TELEPHONE ENCOUNTER
Hi Good Afternoon   Lindsey Quinn from [address] is requesting to talk with Dr. Márquez Asp or Nurse to discuss about Pt. Order regarding home health, if she needs nursing? And the start of care is on Saturday. Please call her back at [phone].  Please and thank you.    -carolyn

## 2021-11-27 ENCOUNTER — HOME CARE VISIT (OUTPATIENT)
Dept: SCHEDULING | Facility: HOME HEALTH | Age: 36
End: 2021-11-27
Payer: MEDICARE

## 2021-11-27 VITALS — RESPIRATION RATE: 18 BRPM | HEART RATE: 85 BPM | TEMPERATURE: 97.8 F | OXYGEN SATURATION: 98 %

## 2021-11-27 PROCEDURE — 400018 HH-NO PAY CLAIM PROCEDURE

## 2021-11-27 PROCEDURE — G0151 HHCP-SERV OF PT,EA 15 MIN: HCPCS

## 2021-11-27 PROCEDURE — 400013 HH SOC

## 2021-11-27 PROCEDURE — 3331090001 HH PPS REVENUE CREDIT

## 2021-11-27 PROCEDURE — 3331090002 HH PPS REVENUE DEBIT

## 2021-11-28 ENCOUNTER — HOME CARE VISIT (OUTPATIENT)
Dept: SCHEDULING | Facility: HOME HEALTH | Age: 36
End: 2021-11-28
Payer: MEDICARE

## 2021-11-28 PROCEDURE — 3331090002 HH PPS REVENUE DEBIT

## 2021-11-28 PROCEDURE — 3331090001 HH PPS REVENUE CREDIT

## 2021-11-28 PROCEDURE — G0299 HHS/HOSPICE OF RN EA 15 MIN: HCPCS

## 2021-11-29 ENCOUNTER — HOME CARE VISIT (OUTPATIENT)
Dept: SCHEDULING | Facility: HOME HEALTH | Age: 36
End: 2021-11-29
Payer: MEDICARE

## 2021-11-29 VITALS — HEART RATE: 82 BPM | TEMPERATURE: 98 F | OXYGEN SATURATION: 98 %

## 2021-11-29 PROCEDURE — 3331090001 HH PPS REVENUE CREDIT

## 2021-11-29 PROCEDURE — 3331090002 HH PPS REVENUE DEBIT

## 2021-11-29 PROCEDURE — G0152 HHCP-SERV OF OT,EA 15 MIN: HCPCS

## 2021-11-30 ENCOUNTER — TELEPHONE (OUTPATIENT)
Dept: FAMILY MEDICINE CLINIC | Age: 36
End: 2021-11-30

## 2021-11-30 VITALS — HEART RATE: 94 BPM | RESPIRATION RATE: 18 BRPM | OXYGEN SATURATION: 97 % | TEMPERATURE: 97.8 F

## 2021-11-30 PROCEDURE — 3331090001 HH PPS REVENUE CREDIT

## 2021-11-30 PROCEDURE — A4649 SURGICAL SUPPLIES: HCPCS

## 2021-11-30 PROCEDURE — A6212 FOAM DRG <=16 SQ IN W/BORDER: HCPCS

## 2021-11-30 PROCEDURE — 3331090002 HH PPS REVENUE DEBIT

## 2021-11-30 PROCEDURE — MED10158 APPLICATOR, COTTON-TIP, WOOD, 6, STRL

## 2021-11-30 NOTE — TELEPHONE ENCOUNTER
Dr. Radha Gibbons does not really need anymore physical therapy, Just wound care. I will see her one more visit for therapy. Nursing will see her once a week for 9 weeks.      Yung Vergara  [phone]

## 2021-11-30 NOTE — TELEPHONE ENCOUNTER
---[phone]  [facility] to see patient on Sunday, started thera honey and a foam dressing to the left leg. To go there once a week to a recheck on the wound. Mother is doing this on the other days.

## 2021-12-01 PROCEDURE — 3331090002 HH PPS REVENUE DEBIT

## 2021-12-01 PROCEDURE — 3331090001 HH PPS REVENUE CREDIT

## 2021-12-02 ENCOUNTER — HOME CARE VISIT (OUTPATIENT)
Dept: SCHEDULING | Facility: HOME HEALTH | Age: 36
End: 2021-12-02
Payer: MEDICARE

## 2021-12-02 PROCEDURE — 3331090001 HH PPS REVENUE CREDIT

## 2021-12-02 PROCEDURE — G0151 HHCP-SERV OF PT,EA 15 MIN: HCPCS

## 2021-12-02 PROCEDURE — 3331090002 HH PPS REVENUE DEBIT

## 2021-12-03 ENCOUNTER — HOME CARE VISIT (OUTPATIENT)
Dept: SCHEDULING | Facility: HOME HEALTH | Age: 36
End: 2021-12-03
Payer: MEDICARE

## 2021-12-03 ENCOUNTER — OFFICE VISIT (OUTPATIENT)
Dept: FAMILY MEDICINE CLINIC | Age: 36
End: 2021-12-03
Payer: MEDICARE

## 2021-12-03 VITALS — TEMPERATURE: 97.6 F | OXYGEN SATURATION: 98 % | RESPIRATION RATE: 18 BRPM | HEART RATE: 84 BPM

## 2021-12-03 VITALS
BODY MASS INDEX: 67.81 KG/M2 | HEIGHT: 55 IN | HEART RATE: 98 BPM | TEMPERATURE: 98.5 F | RESPIRATION RATE: 16 BRPM | OXYGEN SATURATION: 95 % | WEIGHT: 293 LBS

## 2021-12-03 DIAGNOSIS — L97.921: Primary | ICD-10-CM

## 2021-12-03 DIAGNOSIS — Q87.11 PRADER-WILLI SYNDROME: ICD-10-CM

## 2021-12-03 PROCEDURE — G8427 DOCREV CUR MEDS BY ELIG CLIN: HCPCS | Performed by: STUDENT IN AN ORGANIZED HEALTH CARE EDUCATION/TRAINING PROGRAM

## 2021-12-03 PROCEDURE — 3331090002 HH PPS REVENUE DEBIT

## 2021-12-03 PROCEDURE — G8432 DEP SCR NOT DOC, RNG: HCPCS | Performed by: STUDENT IN AN ORGANIZED HEALTH CARE EDUCATION/TRAINING PROGRAM

## 2021-12-03 PROCEDURE — 3331090001 HH PPS REVENUE CREDIT

## 2021-12-03 PROCEDURE — G0463 HOSPITAL OUTPT CLINIC VISIT: HCPCS | Performed by: STUDENT IN AN ORGANIZED HEALTH CARE EDUCATION/TRAINING PROGRAM

## 2021-12-03 PROCEDURE — G8417 CALC BMI ABV UP PARAM F/U: HCPCS | Performed by: STUDENT IN AN ORGANIZED HEALTH CARE EDUCATION/TRAINING PROGRAM

## 2021-12-03 PROCEDURE — 99213 OFFICE O/P EST LOW 20 MIN: CPT | Performed by: STUDENT IN AN ORGANIZED HEALTH CARE EDUCATION/TRAINING PROGRAM

## 2021-12-03 PROCEDURE — G8756 NO BP MEASURE DOC: HCPCS | Performed by: STUDENT IN AN ORGANIZED HEALTH CARE EDUCATION/TRAINING PROGRAM

## 2021-12-03 NOTE — PROGRESS NOTES
Subjective   CC:  Boone Ogden is a 39 y.o. female who presents for wound check. Accompanied by mother today, who provides history. Cellulitis overlying chronic left lateral leg ulcer  She was intially given topical Bacitracin ointment and Clindamycin. Then completed 10 day course of Doxycycline on 11/12. Was seen on 11/19 by Dr. Su Medina and put on another 10 day course of Doxycycline due to concern for continued infection. No drainage or discharge from wound. Completed course of Doxycycline as Rx'ed. States that wound continues to have small white \"pus looking sites\" and concerned that they may need to repeat another course of abx. No fevers - though per mother, part of pt's constellation of symptoms a/w Prader Willi syndrome is that she does not mount fevers in response to infection. Otherwise, no chills, purulent discharge, increased warmth of leg ulcer. In past, was seen by wound care nurse, but more recently has not been able to get home health service to provide wound care nursing. Weight loss  Has not been able to walk 2/2 pain a/w ulcer. Has been trying to adhere to calorie restricted diet. Interested in potentially starting with weight loss clinic in future, but would like to continue to think on this for now. Review of Systems   Constitutional: Positive for activity change. Negative for chills and fever. HENT: Negative for congestion and sore throat. Respiratory: Negative for cough, chest tightness and shortness of breath. Cardiovascular: Negative for chest pain and leg swelling. Gastrointestinal: Negative for abdominal pain, constipation, diarrhea, nausea and vomiting. Skin: Positive for wound. Neurological: Negative for dizziness, weakness and headaches.        Past Medical History  Past Medical History:   Diagnosis Date    Anxiety disorder     Severe    Cellulitis, abdominal wall 5/18/2018    Congestive heart failure, unspecified     Diabetes ([facility] [address]     Hypertension     Ill-defined condition     lymphedema    Lymph edema     Obesity     Open wound of abdominal wall with complication 3/22/6401    Prader - Primus Slack syndrome     Sleep apnea     cpap       Current Medications  Current Outpatient Medications   Medication Sig Dispense Refill    clindamycin (CLEOCIN T) 1 % external solution Apply 1 Units to affected area two (2) times a day. wounds to LLE      ALPRAZolam (XANAX) 0.25 mg tablet Take 1 Tablet by mouth three (3) times daily as needed for Anxiety. Max Daily Amount: 0.75 mg. 90 Tablet 2    nystatin (MYCOSTATIN) topical cream MIX WITH TRIAMCINOLONE CREAM AND APPLY TO THE AFFECTED AREA TWICE DAILY 240 g 5    erythromycin (ILOTYCIN) ophthalmic ointment Administer to both eyes nightly as needed 3.5 g 1    triamcinolone acetonide (KENALOG) 0.1 % topical cream MIX WITH NYSTATIN CREAM AND APPLY TO THE AFFECTED AREA TWICE DAILY (Patient taking differently: MIX WITH NYSTATIN CREAM AND APPLY TO THE AFFECTED AREA (groin area) TWICE DAILY) 240 g 1    nystatin (Nystop) powder APPLY TO THE AFFECTED AREA FOUR TIMES DAILY (Patient taking differently: Apply 1 Units to affected area four (4) times daily. APPLY TO THE AFFECTED AREA (groin area) FOUR TIMES DAILY) 240 g 3    ammonium lactate (AMLACTIN) 12 % topical cream APPLY TO THE AFFECTED AREA TWICE DAILY (Patient taking differently: APPLY TO THE AFFECTED AREA (feet, extremities, back, stomach) TWICE DAILY) 385 g 3    ARIPiprazole (Abilify) 5 mg tablet Take 10 mg by mouth daily. Taking 10 mg      mupirocin (BACTROBAN) 2 % ointment Apply to affected area twice daily as needed (Patient taking differently: Apply 1 g to affected area two (2) times a day.  Apply to affected area (wounds to legs) twice daily as needed) 22 g 1    pantoprazole (PROTONIX) 40 mg tablet TAKE 1 TABLET BY MOUTH DAILY 90 Tab 3    imipramine (TOFRANIL) 25 mg tablet TAKE 3 TABLETS BY MOUTH EVERY NIGHT AT BEDTIME 270 Tab 0    simvastatin (ZOCOR) 40 mg tablet Take 40 mg by mouth daily.  clotrimazole (GYNE-LOTRIMIN) 2 % vaginal cream Insert 1 Applicatorful into vagina nightly. 1 Tube 0    nystatin-triamcinolone (MYCOLOG II) topical cream APPLY EXTERNALLY TO THE AFFECTED AREA TWICE DAILY (Patient taking differently: Apply 1 Tube to affected area two (2) times a day. APPLY EXTERNALLY TO THE AFFECTED AREA (groin) TWICE DAILY) 240 g 3    risperiDONE (RISPERDAL) 1 mg tablet Take 2 Tabs by mouth two (2) times a day. Indications: psychosis (Patient taking differently: Take 3 mg by mouth two (2) times a day. Indications: psychosis) 360 Tab 3    insulin aspart U-100 (NOVOLOG) 100 unit/mL inpn 40 Units by SubCUTAneous route Before breakfast, lunch, dinner and at bedtime.  Adhesive Bandage 6 X 8 \" bndg 1 Each by Apply Externally route daily. Mepilex Transfers 6x8 inches. T14.8, 2x2cm open area with moderate drainage 1mm deep. 1 box. 1 Each 2    furosemide (LASIX) 40 mg tablet Take 40 mg by mouth daily.  naftifine (NAFTIN) 1 % topical cream APPLY EXTERNALLY TO THE AFFECTED AREA EVERY DAY FOR 2 WEEKS OR AS DIRECTED (Patient taking differently: Apply 1 g to affected area daily. APPLY EXTERNALLY TO THE AFFECTED AREA (toes, feet) EVERY DAY FOR 2 WEEKS OR AS DIRECTED) 60 g 0    albuterol (PROVENTIL HFA, VENTOLIN HFA, PROAIR HFA) 90 mcg/actuation inhaler Take 1 Puff by inhalation every four (4) hours as needed for Wheezing. Dispense 8g inhaler. 1 Inhaler 0    silver sulfADIAZINE (SILVADENE) 1 % topical cream Apply  to affected area daily. Apply daily as directed (Patient taking differently: Apply  to affected area as needed for Itching. Apply daily as directed to groin area) 85 g 1    insulin glargine (LANTUS,BASAGLAR) 100 unit/mL (3 mL) inpn by SubCUTAneous route two (2) times a day.  80mg am and 80mg pm.      furosemide (LASIX) 20 mg tablet TAKE 1 TABLET BY MOUTH EVERY DAY 90 Tab 0    hydrOXYzine (VISTARIL) 25 mg capsule TAKE 1 CAPSULE BY MOUTH THREE TIMES DAILY AS NEEDED FOR ITCHING 30 Cap 0    fluticasone (FLONASE) 50 mcg/actuation nasal spray 2 Sprays by Both Nostrils route daily. (Patient taking differently: 2 Sprays by Both Nostrils route two (2) times a day.) 1 Bottle 6    fenofibrate nanocrystallized (TRICOR) 145 mg tablet TAKE 1 TABLET BY MOUTH DAILY 90 Tab 0       Allergies  Allergies   Allergen Reactions    Bactrim [Sulfamethoprim Ds] Other (comments)     Nausea, abdominal pain and cramping      Exenatide Rash       Social History   Social History     Socioeconomic History    Marital status: SINGLE     Spouse name: Not on file    Number of children: Not on file    Years of education: Not on file    Highest education level: Not on file   Occupational History    Not on file   Tobacco Use    Smoking status: Never Smoker    Smokeless tobacco: Never Used   Substance and Sexual Activity    Alcohol use: No     Alcohol/week: 0.0 standard drinks    Drug use: No    Sexual activity: Never   Other Topics Concern     Service Not Asked    Blood Transfusions Not Asked    Caffeine Concern Not Asked    Occupational Exposure Not Asked    Hobby Hazards Not Asked    Sleep Concern Not Asked    Stress Concern Not Asked    Weight Concern Not Asked    Special Diet Not Asked    Back Care Not Asked    Exercise Not Asked    Bike Helmet Not Asked    Seat Belt Not Asked    Self-Exams Not Asked   Social History Narrative    Not on file     Social Determinants of Health     Financial Resource Strain:     Difficulty of Paying Living Expenses: Not on file   Food Insecurity:     Worried About Running Out of Food in the Last Year: Not on file    Erik of Food in the Last Year: Not on file   Transportation Needs:     Lack of Transportation (Medical): Not on file    Lack of Transportation (Non-Medical):  Not on file   Physical Activity:     Days of Exercise per Week: Not on file    Minutes of Exercise per Session: Not on file   Stress:     Feeling of Stress : Not on file   Social Connections:     Frequency of Communication with Friends and Family: Not on file    Frequency of Social Gatherings with Friends and Family: Not on file    Attends Confucianist Services: Not on file    Active Member of Clubs or Organizations: Not on file    Attends Club or Organization Meetings: Not on file    Marital Status: Not on file   Intimate Partner Violence:     Fear of Current or Ex-Partner: Not on file    Emotionally Abused: Not on file    Physically Abused: Not on file    Sexually Abused: Not on file   Housing Stability:     Unable to Pay for Housing in the Last Year: Not on file    Number of [place] in the Last Year: Not on file    Unstable Housing in the Last Year: Not on file       Family History  Family History   Problem Relation Age of Onset    Hypertension Mother     Hypertension Father     Mult Sclerosis Brother        Objective   Vital Signs  Visit Vitals  Pulse 98   Temp 98.5 °F (36.9 °C) (Oral)   Resp 16   Ht 4' 7\" (1.397 m)   Wt 316 lb 9.6 oz (143.6 kg)   SpO2 95%   BMI 73.58 kg/m²       Physical Examination  Physical Exam  Constitutional:       General: She is not in acute distress. Appearance: She is not toxic-appearing. Comments: Morbidly obese female, able to ambulate with assistance from mother   Eyes:      Conjunctiva/sclera: Conjunctivae normal.   Cardiovascular:      Rate and Rhythm: Normal rate and regular rhythm. Pulmonary:      Effort: Pulmonary effort is normal.      Breath sounds: Normal breath sounds. Abdominal:      General: Abdomen is flat. Palpations: Abdomen is soft. Musculoskeletal:         General: Normal range of motion. Cervical back: Normal range of motion and neck supple. Skin:     General: Skin is warm and dry. Findings: Lesion present. Neurological:      General: No focal deficit present. Mental Status: She is alert and oriented to person, place, and time.        Left lateral leg with 4cmx1.5cm lesion with central granulation tissue present - no discharge or drainage. Assessment and Plan   Diane Cuellar is a 39 y.o. who is here for wound check. 1. Non-prs chr ulc unsp prt of l low leg limited to brkdwn skin (HCC)  Ulcer appears to be healing well at this time - ulcer with granulation tissue, which mother initially concerned was extension of infection.   - Continue with wound care dressing  - Would attempt to find wound care nursing to provide care to pt at home in future. Unfortunately, was unable to ascertain if this would be an option as pt became anxious and requested to leave prior to being able to further discuss this option with her    2. Prader-Willi syndrome  Likely a considerable part of why pt has been unsuccessful w/ weight loss at present. Per chart review, pt does not see a specialist for Margaret Alvarado, would be worth consideration of referral to specialist for care to see if there is any additional medication or psychosocial support available to pt and her family. Also would recommend her to establish with weight loss clinic. RT annual medicare wellness visit    Patient is counseled to return to the office if symptoms do not improve as expected. Urgent consultation with the nearest Emergency Department is strongly recommended if condition worsens. Patient is counseled to follow up as recommended and to inform the office if any changes in treatment are recommended.       I discussed this patient with Dr. Chucky Patel (Attending Physician)       Signed By:  Earl Bryant MD  Family Medicine Resident

## 2021-12-03 NOTE — PROGRESS NOTES
Adama Santana is a 39 y.o. female    Chief Complaint   Patient presents with    Wound Check     Patient is coming in for sore on the bottom of her left foot. She has 3 wounds 2 on her calf and one on the bottom of her foot. no other concerns. 1. Have you been to the ER, urgent care clinic since your last visit? Hospitalized since your last visit? No    2. Have you seen or consulted any other health care providers outside of the [address] [place] [address] since your last visit? Include any pap smears or colon screening. No      Visit Vitals  Pulse 98   Temp 98.5 °F (36.9 °C) (Oral)   Resp 16   Ht 4' 7\" (1.397 m)   Wt 316 lb 9.6 oz (143.6 kg)   SpO2 95%   BMI 73.58 kg/m²           Health Maintenance Due   Topic Date Due    Hepatitis C Screening  Never done    Cervical cancer screen  Never done    Eye Exam Retinal or Dilated  04/21/2018    Medicare Yearly Exam  06/15/2019    Lipid Screen  11/15/2019    A1C test (Diabetic or Prediabetic)  05/29/2020    MICROALBUMIN Q1  07/30/2020    Foot Exam Q1  09/16/2020    COVID-19 Vaccine (2 - Booster for Kellee series) 05/16/2021         Medication Reconciliation completed, changes noted.   Please  Update medication list.

## 2021-12-04 PROCEDURE — 3331090001 HH PPS REVENUE CREDIT

## 2021-12-04 PROCEDURE — 3331090002 HH PPS REVENUE DEBIT

## 2021-12-05 PROCEDURE — 3331090001 HH PPS REVENUE CREDIT

## 2021-12-05 PROCEDURE — 3331090002 HH PPS REVENUE DEBIT

## 2021-12-06 PROCEDURE — 3331090002 HH PPS REVENUE DEBIT

## 2021-12-06 PROCEDURE — 3331090001 HH PPS REVENUE CREDIT

## 2021-12-07 PROCEDURE — 3331090002 HH PPS REVENUE DEBIT

## 2021-12-07 PROCEDURE — 3331090001 HH PPS REVENUE CREDIT

## 2021-12-16 ENCOUNTER — OFFICE VISIT (OUTPATIENT)
Dept: SLEEP MEDICINE | Age: 36
End: 2021-12-16
Payer: MEDICARE

## 2021-12-16 ENCOUNTER — DOCUMENTATION ONLY (OUTPATIENT)
Dept: SLEEP MEDICINE | Age: 36
End: 2021-12-16

## 2021-12-16 VITALS — BODY MASS INDEX: 67.81 KG/M2 | HEART RATE: 99 BPM | OXYGEN SATURATION: 97 % | HEIGHT: 55 IN | WEIGHT: 293 LBS

## 2021-12-16 DIAGNOSIS — E66.2 HYPOVENTILATION ASSOCIATED WITH OBESITY SYNDROME (HCC): ICD-10-CM

## 2021-12-16 DIAGNOSIS — I10 HYPERTENSION, UNSPECIFIED TYPE: ICD-10-CM

## 2021-12-16 DIAGNOSIS — G47.33 OSA (OBSTRUCTIVE SLEEP APNEA): Primary | ICD-10-CM

## 2021-12-16 PROCEDURE — G8417 CALC BMI ABV UP PARAM F/U: HCPCS | Performed by: NURSE PRACTITIONER

## 2021-12-16 PROCEDURE — G8432 DEP SCR NOT DOC, RNG: HCPCS | Performed by: NURSE PRACTITIONER

## 2021-12-16 PROCEDURE — G8427 DOCREV CUR MEDS BY ELIG CLIN: HCPCS | Performed by: NURSE PRACTITIONER

## 2021-12-16 PROCEDURE — 99213 OFFICE O/P EST LOW 20 MIN: CPT | Performed by: NURSE PRACTITIONER

## 2021-12-16 PROCEDURE — G8756 NO BP MEASURE DOC: HCPCS | Performed by: NURSE PRACTITIONER

## 2021-12-16 NOTE — PROGRESS NOTES
[address] [facility] [place] [place], [facility]   Tel.  [phone]   Fax. [address] [facility] [address]   [place], [address] [facility]   Tel.  [phone]   Fax. [phone] [address]    Tel.  [phone]   Fax. [phone]     Susan Figueroa (: 1985) is a 39 y.o. female, established patient, seen for positive airway pressure follow-up and evaluation. She was last seen by Dr. Amira Martinez on 2019, prior notes reviewed in detail. She was diagnosed with sleep apnea many years ago and has remained on her device ever since. She is seen today for follow up. ASSESSMENT/PLAN:    ICD-10-CM ICD-9-CM    1. CODY (obstructive sleep apnea)  G47.33 327.23 AMB SUPPLY ORDER   2. Hypoventilation associated with obesity syndrome (HCC)  E66.2 278.03 AMB SUPPLY ORDER   3. Hypertension, unspecified type  I10 401.9    4. BMI 70 and over, adult ([facility] [address]  Z68.45 V85.45      AHI = ?. On CPAP :  8 cmH2O. Set up ? Talia Bai She is adherent with PAP therapy and PAP continues to benefit patient and remains necessary for control of her sleep apnea. Sleep Apnea - Continue on current pressures    Her mother who accompanies her notes she has had her current device for >7 years. She is interested in upgrading the unit. Order placed. Orders Placed This Encounter    AMB SUPPLY ORDER     Primary Encounter Diagnosis: Obstructive Sleep Apnea  (G47.33)    ResMed Device with Heated Humidifer [id number] / [id number]. Positive Airway Pressure Therapy: Duration of need: 99 months. Set Pressure: 8 cmH2O  Diagnosis: (G47.33) CODY (obstructive sleep apnea)  (primary encounter diagnosis)     Replacement Supplies for Positive Airway Pressure Therapy Device:   Duration of need: 99 months.  Nasal Pillows Combo Mask (Replace) 2 per month.  Nasal Pillows (Replace) 2 per month.  Nasal Cushion (Replace) 2 per month.  Nasal Interface Mask 1 every 3 months.    Full Face Mask 1 every 3 months.  Full Face Mask Cushion 1 per month.  Headgear 1 every 6 months.  Positive Airway Pressure chinstrap 1 every 6 months.  Tubing with heating element 1 every 3 months.  Filter(s) Disposable 2 per month.  Filter(s) Non-Disposable 1 every 6 months. .   [address] for Humidifier (Replace) 1 every 6 months. Kayla Mora [facility] NPI: [id number]    Electronically signed. Date:- 12/16/21     * Counseling was provided regarding the importance of regular PAP use with emphasis on ensuring sufficient total sleep time, proper sleep hygiene, and safe driving. * Re-enforced proper and regular cleaning for the device. * She was asked to contact our office for any problems regarding PAP therapy. 2. Hypertension -  continue on her current regimen, she will continue to monitor her BP and follow up with her PMD for reevaluation/adjustment of medications if warranted. I have reviewed the relationship between hypertension as it relates to sleep-disordered breathing. 3. Recommended a dedicated weight loss program through appropriate diet and exercise regimen as significant weight reduction has been shown to reduce severity of obstructive sleep apnea. SUBJECTIVE/OBJECTIVE:    She  is seen today for follow up on PAP device and reports no problems using the device. The following concerns identified:    Drowsiness no Problems exhaling no   Snoring no Forget to put on no   Mask Comfortable yes Can't fall asleep no   Dry Mouth no Mask falls off no   Air Leaking no Frequent awakenings no     She admits that her sleep has significantly improved on PAP therapy using full face mask and heated tubing. Review of device download indicated:  CPAP pressure: 8 cmH2O;   % Used Days >= 4 hours: 100. Avg hours used:  8 hours 37 minutes. Therapy Apnea Index averaged over PAP use: 6.0 /hr which reflects improved sleep breathing condition.     [place] Sleepiness Score: 2 and Modified F.O.S.Q. Score Total / 2: 20 which reflects improved sleep quality over therapy time. Sleep Review of Systems: notable for Negative difficulty falling asleep; Negative awakenings at night; Negative early morning headaches; Negative memory problems; Negative concentration issues; Negative chest pain; Negative shortness of breath; Negative significant joint pain at night; Negative significant muscle pain at night; Negative rashes or itching; Negative heartburn; Negative significant mood issues      Visit Vitals  Pulse 99   Ht 4' 7\" (1.397 m)   Wt 315 lb (142.9 kg)   SpO2 97%   BMI 73.21 kg/m²          General:   Alert, oriented, not in acute distress   Eyes:  Anicteric Sclerae; no obvious strabismus   Nose:  No obvious nasal septum deviation    Neck:   Midline trachea   Chest/Lungs:  Symmetrical lung expansion, clear lung fields on auscultation    CVS:  Normal rate, regular rhythm,  no JVD   Extremities:  No obvious rashes, no edema    Neuro:  No focal deficits; No obvious tremor    Psych:  Normal affect,  normal countenance     Patient's phone number [phone] (home)  was reviewed and confirmed for accuracy. She gives permission for messages regarding results and appointments to be left at that number. On this date 12/16/2021 I have spent 20 minutes reviewing previous notes, test results and face to face with the patient discussing the diagnosis and importance of compliance with the treatment plan as well as documenting on the day of the visit. An electronic signature was used to authenticate this note.     -- Von Boyer NP, [facility]  12/16/21

## 2022-01-19 DIAGNOSIS — R23.8 SKIN IRRITATION: ICD-10-CM

## 2022-01-19 RX ORDER — TRIAMCINOLONE ACETONIDE 1 MG/G
CREAM TOPICAL
Qty: 240 G | Refills: 1 | Status: SHIPPED | OUTPATIENT
Start: 2022-01-19 | End: 2022-03-18

## 2022-02-10 DIAGNOSIS — R23.8 SKIN IRRITATION: ICD-10-CM

## 2022-02-10 RX ORDER — AMMONIUM LACTATE 12 G/100G
CREAM TOPICAL
Qty: 385 G | Refills: 3 | Status: SHIPPED | OUTPATIENT
Start: 2022-02-10 | End: 2022-10-03

## 2022-03-17 DIAGNOSIS — R23.8 SKIN IRRITATION: ICD-10-CM

## 2022-03-18 RX ORDER — TRIAMCINOLONE ACETONIDE 1 MG/G
CREAM TOPICAL
Qty: 240 G | Refills: 1 | Status: SHIPPED | OUTPATIENT
Start: 2022-03-18 | End: 2022-06-06

## 2022-03-19 PROBLEM — E66.01 MORBID OBESITY WITH BMI OF 70 AND OVER, ADULT (HCC): Status: ACTIVE | Noted: 2018-02-23

## 2022-03-19 PROBLEM — L97.921: Status: ACTIVE | Noted: 2021-09-15

## 2022-03-19 PROBLEM — L02.91 SKIN ABSCESS: Status: ACTIVE | Noted: 2018-05-18

## 2022-03-19 PROBLEM — S92.901D CLOSED FRACTURE OF RIGHT FOOT WITH ROUTINE HEALING: Status: ACTIVE | Noted: 2017-09-13

## 2022-04-21 NOTE — PROGRESS NOTES
[address] [place] [place], [address]   Tel.  [phone]   Fax. [address] [facility] [address] [facility] [address]   [place], [address] [facility]   Tel.  [phone]   Fax. [phone] [address] [facility] [address]   Tel.  [phone]   Fax. [phone]     Minh Shah (: 1985) is a 39 y.o. female, established patient, seen for positive airway pressure follow-up and evaluation. She was last seen by Dr. Merced Do on 2019, prior notes reviewed in detail. She was diagnosed with sleep apnea many years ago and has remained on her device ever since. She is seen today for follow up. ASSESSMENT/PLAN:    ICD-10-CM ICD-9-CM    1. CODY (obstructive sleep apnea)  G47.33 327.23 AMB SUPPLY ORDER   2. Primary hypertension  I10 401.9    3. BMI 70 and over, adult ([facility] [address]  Z68.45 V85.45      AHI = ?. On CPAP :  8 cmH2O. Set up . She is adherent with PAP therapy and PAP continues to benefit patient and remains necessary for control of her sleep apnea. Sleep Apnea - continue on current pressures     Orders Placed This Encounter    AMB SUPPLY ORDER     Diagnosis: (G47.33) CODY (obstructive sleep apnea)  (primary encounter diagnosis)     Replacement Supplies for Positive Airway Pressure Therapy Device:   Duration of need: 99 months.  Full Face Mask 1 every 3 months.  Full Face Mask Cushion 1 per month.  Headgear 1 every 6 months.  Positive Airway Pressure chinstrap 1 every 6 months.  Tubing with heating element 1 every 3 months.  Filter(s) Disposable 2 per month.  Filter(s) Non-Disposable 1 every 6 months. .   [address] [facility] for Humidifier (Replace) 1 every 6 months. Breanna Tolliver [facility] NPI: [id number]    Electronically signed. Date:- 22     * Counseling was provided regarding the importance of regular PAP use with emphasis on ensuring sufficient total sleep time, proper sleep hygiene, and safe driving.     * Re-enforced proper and regular cleaning for the device. * She was asked to contact our office for any problems regarding PAP therapy. 2. Hypertension -  continue on her current regimen, she will continue to monitor her BP and follow up with her PMD for reevaluation/adjustment of medications if warranted. I have reviewed the relationship between hypertension as it relates to sleep-disordered breathing. 3. Type II diabetes -  she continues on her current regimen. I have reviewed the relationship between sleep disordered breathing as it relates to diabetes. 4. Recommended a dedicated weight loss program through appropriate diet and exercise regimen as significant weight reduction has been shown to reduce severity of obstructive sleep apnea. SUBJECTIVE/OBJECTIVE:    She  is seen today for follow up on PAP device and reports no problems using the device. The following concerns identified:    Drowsiness no Problems exhaling no   Snoring no Forget to put on no   Mask Comfortable yes Can't fall asleep no   Dry Mouth no Mask falls off no   Air Leaking no Frequent awakenings no     She admits that her sleep has significantly improved on PAP therapy using full face mask and heated tubing. Review of device download indicated:  CPAP  pressure: 8 cmH2O  95th Percentile Leak: 7.3 L/Min     % Used Days >= 4 hours: 100. Avg hours used:  9 hours 46 minutes. Therapy Apnea Index averaged over PAP use: 2.6 /hr which reflects significantly improved sleep breathing condition. [place] Sleepiness Score: 2 and Modified F.O.S.Q. Score Total / 2: 20 which reflects improved sleep quality over therapy time. Sleep Review of Systems: notable for Negative difficulty falling asleep; Negative awakenings at night; Negative early morning headaches; Negative memory problems; Negative concentration issues;  Negative chest pain; Negative shortness of breath; Negative significant joint pain at night; Negative significant muscle pain at night; Negative rashes or itching; Negative heartburn; Negative significant mood issues    Visit Vitals  Pulse 99   Ht 4' 7\" (1.397 m)   Wt 311 lb (141.1 kg)   SpO2 95%   BMI 72.28 kg/m²          General:   Alert, oriented, not in acute distress   Eyes:  Anicteric Sclerae; no obvious strabismus   Nose:  No obvious nasal septum deviation    Neck:   Midline trachea   Chest/Lungs:  Symmetrical lung expansion, clear lung fields on auscultation    CVS:  Normal rate, regular rhythm,  no JVD   Extremities:  No obvious rashes, no edema    Neuro:  No focal deficits; No obvious tremor    Psych:  Normal affect,  normal countenance     Patient's phone number [phone] (home)  was reviewed and confirmed for accuracy. She gives permission for messages regarding results and appointments to be left at that number. On this date 04/22/2022 I have spent 20 minutes reviewing previous notes, test results and face to face with the patient discussing the diagnosis and importance of compliance with the treatment plan as well as documenting on the day of the visit. An electronic signature was used to authenticate this note.     -- Tiff Santizo NP, Counts include 234 beds at the [facility]  04/22/22

## 2022-04-22 ENCOUNTER — OFFICE VISIT (OUTPATIENT)
Dept: SLEEP MEDICINE | Age: 37
End: 2022-04-22
Payer: MEDICARE

## 2022-04-22 ENCOUNTER — DOCUMENTATION ONLY (OUTPATIENT)
Dept: SLEEP MEDICINE | Age: 37
End: 2022-04-22

## 2022-04-22 VITALS — HEIGHT: 55 IN | WEIGHT: 293 LBS | HEART RATE: 99 BPM | OXYGEN SATURATION: 95 % | BODY MASS INDEX: 67.81 KG/M2

## 2022-04-22 DIAGNOSIS — I10 PRIMARY HYPERTENSION: ICD-10-CM

## 2022-04-22 DIAGNOSIS — Z79.4 TYPE 2 DIABETES MELLITUS WITH HYPEROSMOLARITY WITHOUT COMA, WITH LONG-TERM CURRENT USE OF INSULIN (HCC): ICD-10-CM

## 2022-04-22 DIAGNOSIS — G47.33 OSA (OBSTRUCTIVE SLEEP APNEA): Primary | ICD-10-CM

## 2022-04-22 DIAGNOSIS — E11.00 TYPE 2 DIABETES MELLITUS WITH HYPEROSMOLARITY WITHOUT COMA, WITH LONG-TERM CURRENT USE OF INSULIN (HCC): ICD-10-CM

## 2022-04-22 PROCEDURE — G8427 DOCREV CUR MEDS BY ELIG CLIN: HCPCS | Performed by: NURSE PRACTITIONER

## 2022-04-22 PROCEDURE — 2022F DILAT RTA XM EVC RTNOPTHY: CPT | Performed by: NURSE PRACTITIONER

## 2022-04-22 PROCEDURE — 99213 OFFICE O/P EST LOW 20 MIN: CPT | Performed by: NURSE PRACTITIONER

## 2022-04-22 PROCEDURE — 3046F HEMOGLOBIN A1C LEVEL >9.0%: CPT | Performed by: NURSE PRACTITIONER

## 2022-04-22 PROCEDURE — G8432 DEP SCR NOT DOC, RNG: HCPCS | Performed by: NURSE PRACTITIONER

## 2022-04-22 PROCEDURE — G8756 NO BP MEASURE DOC: HCPCS | Performed by: NURSE PRACTITIONER

## 2022-04-22 PROCEDURE — G8417 CALC BMI ABV UP PARAM F/U: HCPCS | Performed by: NURSE PRACTITIONER

## 2022-04-22 NOTE — PATIENT INSTRUCTIONS
[facility] [place]. [place], [address]  Tel.  [phone]  Fax. 100 [facility] 60  [place], [address] [facility]  Tel.  [phone]  Fax. [phone] [address]  Tel.  [phone]  Fax. [phone]     Learning About CPAP for Sleep Apnea  What is CPAP? CPAP is a small machine that you use at home every night while you sleep. It increases air pressure in your throat to keep your airway open. When you have sleep apnea, this can help you sleep better so you feel much better. CPAP stands for \"continuous positive airway pressure. \"  The CPAP machine will have one of the following:  · A mask that covers your nose and mouth  · Prongs that fit into your nose  · A mask that covers your nose only, the most common type. This type is called NCPAP. The N stands for \"nasal.\"  Why is it done? CPAP is usually the best treatment for obstructive sleep apnea. It is the first treatment choice and the most widely used. Your doctor may suggest CPAP if you have:  · Moderate to severe sleep apnea. · Sleep apnea and coronary artery disease (CAD) or heart failure. How does it help? · CPAP can help you have more normal sleep, so you feel less sleepy and more alert during the daytime. · CPAP may help keep heart failure or other heart problems from getting worse. · NCPAP may help lower your blood pressure. · If you use CPAP, your bed partner may also sleep better because you are not snoring or restless. What are the side effects? Some people who use CPAP have:  · A dry or stuffy nose and a sore throat. · Irritated skin on the face. · Sore eyes. · Bloating. If you have any of these problems, work with your doctor to fix them. Here are some things you can try:  · Be sure the mask or nasal prongs fit well. · See if your doctor can adjust the pressure of your CPAP. · If your nose is dry, try a humidifier.   · If your nose is runny or stuffy, try decongestant medicine or a steroid nasal spray. If these things do not help, you might try a different type of machine. Some machines have air pressure that adjusts on its own. Others have air pressures that are different when you breathe in than when you breathe out. This may reduce discomfort caused by too much pressure in your nose. Where can you learn more? Go to G2Link.be  Enter Debera Meter in the search box to learn more about \"Learning About CPAP for Sleep Apnea. \"   © 7640-1116 Healthwise, Incorporated. Care instructions adapted under license by Stefania Perez (which disclaims liability or warranty for this information). This care instruction is for use with your licensed healthcare professional. If you have questions about a medical condition or this instruction, always ask your healthcare professional. [address] any warranty or liability for your use of this information. Content Version: 9.1.34870; Last Revised: January 11, 2010  PROPER SLEEP HYGIENE    What to avoid  · Do not have drinks with caffeine, such as coffee or black tea, for 8 hours before bed. · Do not smoke or use other types of tobacco near bedtime. Nicotine is a stimulant and can keep you awake. · Avoid drinking alcohol late in the evening, because it can cause you to wake in the middle of the night. · Do not eat a big meal close to bedtime. If you are hungry, eat a light snack. · Do not drink a lot of water close to bedtime, because the need to urinate may wake you up during the night. · Do not read or watch TV in bed. Use the bed only for sleeping and sexual activity. What to try  · Go to bed at the same time every night, and wake up at the same time every morning. Do not take naps during the day. · Keep your bedroom quiet, dark, and cool. · Get regular exercise, but not within 3 to 4 hours of your bedtime. .  · Sleep on a comfortable pillow and mattress.   · If watching the clock makes you anxious, turn it facing away from you so you cannot see the time. · If you worry when you lie down, start a worry book. Well before bedtime, write down your worries, and then set the book and your concerns aside. · Try meditation or other relaxation techniques before you go to bed. · If you cannot fall asleep, get up and go to another room until you feel sleepy. Do something relaxing. Repeat your bedtime routine before you go to bed again. · Make your house quiet and calm about an hour before bedtime. Turn down the lights, turn off the TV, log off the computer, and turn down the volume on music. This can help you relax after a busy day. Drowsy Driving: The [employer] cites drowsiness as a causing factor in more than 978,287 police reported crashes annually, resulting in 76,000 injuries and 1,500 deaths. Other surveys suggest 55% of people polled have driven while drowsy in the past year, 23% had fallen asleep but not crashed, 3% crashed, and 2% had and accident due to drowsy driving. Who is at risk? Young Drivers: One study of drowsy driving accidents states that 55% of the drivers were under 25 years. Of those, 75% were male. Shift Workers and Travelers: People who work overnight or travel across time zones frequently are at higher risk of experiencing Circadian Rhythm Disorders. They are trying to work and function when their body is programed to sleep. Sleep Deprived: Lack of sleep has a serious impact on your ability to pay attention or focus on a task. Consistently getting less than the average of 8 hours your body needs creates partial or cumulative sleep deprivation. Untreated Sleep Disorders: Sleep Apnea, Narcolepsy, R.L.S., and other sleep disorders (untreated) prevent a person from getting enough restful sleep. This leads to excessive daytime sleepiness and increases the risk for drowsy driving accidents by up to 7 times.   Medications / Alcohol: Even over the counter medications can cause drowsiness. Medications that impair a drivers attention should have a warning label. Alcohol naturally makes you sleepy and on its own can cause accidents. Combined with excessive drowsiness its effects are amplified. Signs of Drowsy Driving:   * You don't remember driving the last few miles   * You may drift out of your tiffany   * You are unable to focus and your thoughts wander   * You may yawn more often than normal   * You have difficulty keeping your eyes open / nodding off   * Missing traffic signs, speeding, or tailgating  Prevention-   Good sleep hygiene, lifestyle and behavioral choices have the most impact on drowsy driving. There is no substitute for sleep and the average person requires 8 hours nightly. If you find yourself driving drowsy, stop and sleep. Consider the sleep hygiene tips provided during your visit as well. Medication Refill Policy: Refills for all medications require 1 week advance notice. Please have your pharmacy fax a refill request. We are unable to fax, or call in \"controled substance\" medications and you will need to pick these prescriptions up from our office. [employer] Activation    Thank you for requesting access to [employer]. Please follow the instructions below to securely access and download your online medical record. [employer] allows you to send messages to your doctor, view your test results, renew your prescriptions, schedule appointments, and more. How Do I Sign Up? 1. In your internet browser, go to [email or web address]. 2. Click on the First Time User? Click Here link in the Sign In box. You will see the New Member Sign Up page. 3. Enter your [employer] Access Code exactly as it appears below. You will not need to use this code after youve completed the sign-up process. If you do not sign up before the expiration date, you must request a new code. [employer] Access Code:  Activation code not generated  Current [employer] Status: Active (This is the date your [employer] access code will )    4. Enter the last four digits of your Social Security Number (xxxx) and Date of Birth (mm/dd/yyyy) as indicated and click Submit. You will be taken to the next sign-up page. 5. Create a [employer] ID. This will be your [employer] login ID and cannot be changed, so think of one that is secure and easy to remember. 6. Create a [employer] password. You can change your password at any time. 7. Enter your Password Reset Question and Answer. This can be used at a later time if you forget your password. 8. Enter your e-mail address. You will receive e-mail notification when new information is available in [address] 9. Click Sign Up. You can now view and download portions of your medical record. 10. Click the Download Summary menu link to download a portable copy of your medical information. Additional Information    If you have questions, please call [phone]. Remember, [employer] is NOT to be used for urgent needs. For medical emergencies, dial 911.

## 2022-05-18 RX ORDER — NYSTATIN 100000 U/G
CREAM TOPICAL
Qty: 240 G | Refills: 5 | Status: SHIPPED | OUTPATIENT
Start: 2022-05-18

## 2022-06-06 DIAGNOSIS — R23.8 SKIN IRRITATION: ICD-10-CM

## 2022-06-06 RX ORDER — TRIAMCINOLONE ACETONIDE 1 MG/G
CREAM TOPICAL
Qty: 240 G | Refills: 1 | Status: SHIPPED | OUTPATIENT
Start: 2022-06-06 | End: 2022-09-09

## 2022-06-07 ENCOUNTER — DOCUMENTATION ONLY (OUTPATIENT)
Dept: CASE MANAGEMENT | Age: 37
End: 2022-06-07

## 2022-06-07 DIAGNOSIS — S31.109S OPEN WOUND OF ANTERIOR ABDOMINAL WALL, SEQUELA: ICD-10-CM

## 2022-06-07 NOTE — ACP (ADVANCE CARE PLANNING)
Advance Care Planning   Ambulatory ACP Specialist Patient Outreach    Date:  6/7/2022 , 6/31/22  ACP Specialist:  Deena Zamorano LCSW     Outreach call to patient in follow-up to ACP Specialist referral from:    [] PCP  [] Provider   [] Ambulatory Care Management [x] Other     For:                  [x] Advance Directive Assistance              [] Complete Portable DNR order              [] Complete POST/MOST              [] Code Status Discussion             [] Discuss Goals of Care             [] Early ACP Decision-Making              [] Other (Specify)    Date Referral Received:6/7/22    Today's Outreach:  [x] First   [] Second  [] Third       Third outreach made by: [] Phone  [] Email / mail    [] MyChart     Intervention:  [x] Spoke with Patient and Mother   [] Left VM requesting return call      Outcome: Pt is a self referral, after discussion with both her PCP and mother. It was agreed that an opportunity for Pt to express any wishes she may have regarding her HCDMs would be of benefit to her, her family, and her medical team. Her conversation has been scheduled for 6/14 @ 3pm. If suitable, plan would be to complete ACP Document identifying HCDMs.     6/13- A reminder call was provided regarding Pts upcoming ACP appointment. Pt confirmed their plan to attend/participate. Next Step:   [x] ACP scheduled conversation  [] Outreach again in one week               [x] Email / Mail ACP Info Sheets  [] Email / Mail Advance Directive   [] Closing referral.  Routing closure to referring provider/staff and to ACP Specialist . [] Closure letter mailed to patient with invitation to contact ACP Specialist if / when ready.   Thank you for this referral.         Deena Zamorano LCSW, [place] [id number] [address]

## 2022-06-08 RX ORDER — MUPIROCIN 20 MG/G
OINTMENT TOPICAL
Qty: 22 G | Refills: 1 | Status: SHIPPED | OUTPATIENT
Start: 2022-06-08 | End: 2022-06-10 | Stop reason: SDUPTHER

## 2022-06-10 ENCOUNTER — APPOINTMENT (OUTPATIENT)
Dept: GENERAL RADIOLOGY | Age: 37
End: 2022-06-10
Attending: EMERGENCY MEDICINE
Payer: MEDICARE

## 2022-06-10 ENCOUNTER — HOSPITAL ENCOUNTER (EMERGENCY)
Age: 37
Discharge: HOME | End: 2022-06-10
Attending: STUDENT IN AN ORGANIZED HEALTH CARE EDUCATION/TRAINING PROGRAM
Payer: MEDICARE

## 2022-06-10 ENCOUNTER — APPOINTMENT (OUTPATIENT)
Dept: VASCULAR SURGERY | Age: 37
End: 2022-06-10
Attending: NURSE PRACTITIONER
Payer: MEDICARE

## 2022-06-10 VITALS
OXYGEN SATURATION: 98 % | TEMPERATURE: 97.8 F | SYSTOLIC BLOOD PRESSURE: 134 MMHG | DIASTOLIC BLOOD PRESSURE: 79 MMHG | RESPIRATION RATE: 16 BRPM | WEIGHT: 293 LBS | HEART RATE: 80 BPM | BODY MASS INDEX: 73.21 KG/M2

## 2022-06-10 DIAGNOSIS — R06.02 SOB (SHORTNESS OF BREATH): Primary | ICD-10-CM

## 2022-06-10 DIAGNOSIS — S31.109S OPEN WOUND OF ANTERIOR ABDOMINAL WALL, SEQUELA: ICD-10-CM

## 2022-06-10 DIAGNOSIS — M79.604 RIGHT LEG PAIN: ICD-10-CM

## 2022-06-10 DIAGNOSIS — R06.2 WHEEZING: ICD-10-CM

## 2022-06-10 LAB
ALBUMIN SERPL-MCNC: 3.5 G/DL (ref 3.5–5)
ALBUMIN/GLOB SERPL: 1 {RATIO} (ref 1.1–2.2)
ALP SERPL-CCNC: 60 U/L (ref 45–117)
ALT SERPL-CCNC: 33 U/L (ref 12–78)
ANION GAP SERPL CALC-SCNC: 9 MMOL/L (ref 5–15)
AST SERPL-CCNC: 20 U/L (ref 15–37)
ATRIAL RATE: 89 BPM
BASOPHILS # BLD: 0.1 K/UL (ref 0–0.1)
BASOPHILS NFR BLD: 1 % (ref 0–1)
BILIRUB SERPL-MCNC: 0.3 MG/DL (ref 0.2–1)
BUN SERPL-MCNC: 13 MG/DL (ref 6–20)
BUN/CREAT SERPL: 18 (ref 12–20)
CALCIUM SERPL-MCNC: 9.1 MG/DL (ref 8.5–10.1)
CALCULATED P AXIS, ECG09: 49 DEGREES
CALCULATED R AXIS, ECG10: -6 DEGREES
CALCULATED T AXIS, ECG11: 24 DEGREES
CHLORIDE SERPL-SCNC: 97 MMOL/L (ref 97–108)
CO2 SERPL-SCNC: 29 MMOL/L (ref 21–32)
CREAT SERPL-MCNC: 0.72 MG/DL (ref 0.55–1.02)
D DIMER PPP FEU-MCNC: 0.27 MG/L FEU (ref 0–0.65)
DIAGNOSIS, 93000: NORMAL
DIFFERENTIAL METHOD BLD: ABNORMAL
EOSINOPHIL # BLD: 0.3 K/UL (ref 0–0.4)
EOSINOPHIL NFR BLD: 2 % (ref 0–7)
ERYTHROCYTE [DISTWIDTH] IN BLOOD BY AUTOMATED COUNT: 12.7 % (ref 11.5–14.5)
GLOBULIN SER CALC-MCNC: 3.6 G/DL (ref 2–4)
GLUCOSE SERPL-MCNC: 97 MG/DL (ref 65–100)
HCG UR QL: NEGATIVE
HCT VFR BLD AUTO: 40.5 % (ref 35–47)
HGB BLD-MCNC: 13.3 G/DL (ref 11.5–16)
IMM GRANULOCYTES # BLD AUTO: 0 K/UL
IMM GRANULOCYTES NFR BLD AUTO: 0 %
LYMPHOCYTES # BLD: 4.1 K/UL (ref 0.8–3.5)
LYMPHOCYTES NFR BLD: 30 % (ref 12–49)
MCH RBC QN AUTO: 28.5 PG (ref 26–34)
MCHC RBC AUTO-ENTMCNC: 32.8 G/DL (ref 30–36.5)
MCV RBC AUTO: 86.7 FL (ref 80–99)
METAMYELOCYTES NFR BLD MANUAL: 1 %
MONOCYTES # BLD: 0.7 K/UL (ref 0–1)
MONOCYTES NFR BLD: 5 % (ref 5–13)
NEUTS BAND NFR BLD MANUAL: 1 % (ref 0–6)
NEUTS SEG # BLD: 8.4 K/UL (ref 1.8–8)
NEUTS SEG NFR BLD: 60 % (ref 32–75)
NRBC # BLD: 0 K/UL (ref 0–0.01)
NRBC BLD-RTO: 0 PER 100 WBC
P-R INTERVAL, ECG05: 140 MS
PLATELET # BLD AUTO: 341 K/UL (ref 150–400)
PMV BLD AUTO: 9.2 FL (ref 8.9–12.9)
POTASSIUM SERPL-SCNC: 3.5 MMOL/L (ref 3.5–5.1)
PROT SERPL-MCNC: 7.1 G/DL (ref 6.4–8.2)
Q-T INTERVAL, ECG07: 352 MS
QRS DURATION, ECG06: 88 MS
QTC CALCULATION (BEZET), ECG08: 428 MS
RBC # BLD AUTO: 4.67 M/UL (ref 3.8–5.2)
RBC MORPH BLD: ABNORMAL
SODIUM SERPL-SCNC: 135 MMOL/L (ref 136–145)
VENTRICULAR RATE, ECG03: 89 BPM
WBC # BLD AUTO: 13.7 K/UL (ref 3.6–11)

## 2022-06-10 PROCEDURE — 80053 COMPREHEN METABOLIC PANEL: CPT

## 2022-06-10 PROCEDURE — 71046 X-RAY EXAM CHEST 2 VIEWS: CPT

## 2022-06-10 PROCEDURE — 81025 URINE PREGNANCY TEST: CPT

## 2022-06-10 PROCEDURE — 36415 COLL VENOUS BLD VENIPUNCTURE: CPT

## 2022-06-10 PROCEDURE — 99285 EMERGENCY DEPT VISIT HI MDM: CPT

## 2022-06-10 PROCEDURE — 85025 COMPLETE CBC W/AUTO DIFF WBC: CPT

## 2022-06-10 PROCEDURE — 93971 EXTREMITY STUDY: CPT

## 2022-06-10 PROCEDURE — 93005 ELECTROCARDIOGRAM TRACING: CPT

## 2022-06-10 PROCEDURE — 85379 FIBRIN DEGRADATION QUANT: CPT

## 2022-06-10 RX ORDER — MUPIROCIN 20 MG/G
OINTMENT TOPICAL
Qty: 22 G | Refills: 3 | Status: SHIPPED | OUTPATIENT
Start: 2022-06-10

## 2022-06-10 RX ORDER — ALBUTEROL SULFATE 90 UG/1
1 AEROSOL, METERED RESPIRATORY (INHALATION)
Qty: 8 G | Refills: 3 | Status: SHIPPED | OUTPATIENT
Start: 2022-06-10

## 2022-06-10 NOTE — ED NOTES
7:40 PM  Change of shift. Care of patient taken over from ELVIA/ Ivan Renee [address] NP; H&P reviewed, bedside handoff complete. Doppler ultrasound negative for DVT. As it was a technically difficult study secondary to body habitus, d dimer was obtained which was negative. Labs and CXR unremarkable. Pt advised on reassuring exams, will f/u with her PCP if symptoms persist. Return precautions outlined, all questions answered at this time.

## 2022-06-10 NOTE — ED PROVIDER NOTES
This is a 66-year-old female who presents by way of wheelchair to the emergency room with complaints of right leg pain. Patient is morbidly obese and has a history of a known DVT in the past, about 1 year ago. She was on Xarelto for 3 months. Dr. Christiano Wharton of vascular surgery took her off of the Xarelto after 3 months because this was her first DVT. Now returns to the emergency room with worsening right leg pain similar to that of when she was diagnosed with her DVT. Denies any chest pain, dizziness, nausea or vomiting, fevers or chills. Does states she has mild shortness of breath. Has not taken any medication for the pain. No further complaints at this time. Sera Arceo MD  Past Medical History:  No date:  Anxiety disorder      Comment:  Severe  5/18/2018: Cellulitis, abdominal wall  No date: Congestive heart failure, unspecified  No date: Diabetes ([address]  No date: Hypertension  No date: Ill-defined condition      Comment:  lymphedema  No date: Lymph edema  No date: Obesity  5/23/2018: Open wound of abdominal wall with complication  No date: [place] syndrome  No date: Sleep apnea      Comment:  cpap  Past Surgical History:  age 25: HX CHOLECYSTECTOMY  No date: 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     Sleep apnea     cpap       Past Surgical History:   Procedure Laterality Date    HX CHOLECYSTECTOMY  age 25   [de-identified] HEENT      HX TONSIL AND ADENOIDECTOMY  age 9   [de-identified] WISDOM TEETH EXTRACTION  10/1/13         Family History:   Problem Relation Age of Onset    Hypertension Mother    [facility] Hypertension Father     Mult Sclerosis Brother        Social History     Socioeconomic History    Marital status: SINGLE     Spouse name: Not on file    Number of children: Not on file    Years of education: Not on file    Highest education level: Not on file   Occupational History    Not on file   Tobacco Use    Smoking status: Never Smoker    Smokeless tobacco: Never Used   Substance and Sexual Activity    Alcohol use: No     Alcohol/week: 0.0 standard drinks    Drug use: No    Sexual activity: Never   Other Topics Concern     Service Not Asked    Blood Transfusions Not Asked    Caffeine Concern Not Asked    Occupational Exposure Not Asked    Hobby Hazards Not Asked    Sleep Concern Not Asked    Stress Concern Not Asked    Weight Concern Not Asked    Special Diet Not Asked    Back Care Not Asked    Exercise Not Asked    Bike Helmet Not Asked    Seat Belt Not Asked    Self-Exams Not Asked   Social History Narrative    Not on file     Social Determinants of Health     Financial Resource Strain:     Difficulty of Paying Living Expenses: Not on file   Food Insecurity:     Worried About Running Out of Food in the Last Year: Not on file    Erik of Food in the Last Year: Not on file   Transportation Needs:     Lack of Transportation (Medical): Not on file    Lack of Transportation (Non-Medical):  Not on file   Physical Activity:     Days of Exercise per Week: Not on file    Minutes of Exercise per Session: Not on file   Stress:     Feeling of Stress : Not on file   Social Connections:     Frequency of Communication with Friends and Family: Not on file    Frequency of Social Gatherings with Friends and Family: Not on file    Attends Anabaptism Services: Not on file    Active Member of Clubs or Organizations: Not on file    Attends Club or Organization Meetings: Not on file    Marital Status: Not on file   Intimate Partner Violence:     Fear of Current or Ex-Partner: Not on file    Emotionally Abused: Not on file    Physically Abused: Not on file    Sexually Abused: Not on file   Housing Stability:     Unable to Pay for Housing in the Last Year: Not on file    Number of Places Lived in the Last Year: Not on file    Unstable Housing in the Last Year: Not on file         ALLERGIES: Bactrim [sulfamethoprim ds] and Exenatide    Review of Systems   Constitutional: Negative for appetite change, chills, diaphoresis, fatigue and fever. HENT: Negative for congestion, ear discharge, ear pain, sinus pressure, sinus pain, sore throat and trouble swallowing. Eyes: Negative for photophobia, pain, redness and visual disturbance. Respiratory: Positive for shortness of breath. Negative for chest tightness and wheezing. Cardiovascular: Negative for chest pain and palpitations. Gastrointestinal: Negative for abdominal distention, abdominal pain, nausea and vomiting. Endocrine: Negative. Genitourinary: Negative for difficulty urinating, flank pain, frequency and urgency. Musculoskeletal: Positive for arthralgias (right leg pain). Negative for back pain, neck pain and neck stiffness. Skin: Negative for color change, pallor, rash and wound. Allergic/Immunologic: Negative. Neurological: Negative for dizziness, speech difficulty, weakness and headaches. Hematological: Does not bruise/bleed easily. Psychiatric/Behavioral: Negative for behavioral problems. The patient is not nervous/anxious. Vitals:    06/10/22 1541   BP: 134/79   Pulse: 80   Resp: 16   Temp: 97.8 °F (36.6 °C)   SpO2: 98%   Weight: 142.9 kg (315 lb)            Physical Exam  Vitals and nursing note reviewed. Constitutional:       General: She is not in acute distress. Appearance: She is well-developed. She is obese. She is not ill-appearing. HENT:      Head: Normocephalic and atraumatic.       Right Ear: External ear normal.      Left Ear: External ear normal.      Nose: Nose normal. No congestion. Mouth/Throat:      Mouth: Mucous membranes are moist.   Eyes:      General:         Right eye: No discharge. Left eye: No discharge. Conjunctiva/sclera: Conjunctivae normal.      Pupils: Pupils are equal, round, and reactive to light. Neck:      Vascular: No JVD. Trachea: No tracheal deviation. Cardiovascular:      Rate and Rhythm: Normal rate and regular rhythm. Pulses: Normal pulses. Heart sounds: Normal heart sounds. Pulmonary:      Effort: Pulmonary effort is normal.      Comments: Diminished    Abdominal:      General: Bowel sounds are normal. There is no distension. Palpations: Abdomen is soft. Tenderness: There is no abdominal tenderness. There is no guarding or rebound. Comments: Obese     Genitourinary:     Comments: Deferred    Musculoskeletal:         General: Tenderness (right leg pain) present. Normal range of motion. Cervical back: Normal range of motion and neck supple. Skin:     General: Skin is warm and dry. Capillary Refill: Capillary refill takes 2 to 3 seconds. Coloration: Skin is not pale. Findings: No erythema or rash. Neurological:      General: No focal deficit present. Mental Status: She is alert and oriented to person, place, and time. Motor: No weakness. Coordination: Coordination normal.   Psychiatric:         Mood and Affect: Mood normal.         Behavior: Behavior normal.         Thought Content: Thought content normal.         Judgment: Judgment normal.          [facility]    4:28 PM  Change of shift. Care of patient signed over to [facility]. Bedside handoff complete. Awaiting duplex and lab data. .       Procedures

## 2022-06-13 ENCOUNTER — DOCUMENTATION ONLY (OUTPATIENT)
Dept: CASE MANAGEMENT | Age: 37
End: 2022-06-13

## 2022-06-14 ENCOUNTER — DOCUMENTATION ONLY (OUTPATIENT)
Dept: CASE MANAGEMENT | Age: 37
End: 2022-06-14

## 2022-06-21 DIAGNOSIS — K21.9 GASTROESOPHAGEAL REFLUX DISEASE WITHOUT ESOPHAGITIS: ICD-10-CM

## 2022-06-21 RX ORDER — PANTOPRAZOLE SODIUM 40 MG/1
TABLET, DELAYED RELEASE ORAL
Qty: 90 TABLET | Refills: 3 | Status: SHIPPED | OUTPATIENT
Start: 2022-06-21

## 2022-06-30 ENCOUNTER — OFFICE VISIT (OUTPATIENT)
Dept: FAMILY MEDICINE CLINIC | Age: 37
End: 2022-06-30
Payer: MEDICARE

## 2022-06-30 VITALS
HEART RATE: 96 BPM | TEMPERATURE: 97.7 F | OXYGEN SATURATION: 95 % | WEIGHT: 293 LBS | HEIGHT: 55 IN | RESPIRATION RATE: 16 BRPM | BODY MASS INDEX: 67.81 KG/M2

## 2022-06-30 DIAGNOSIS — E66.01 MORBID OBESITY (HCC): ICD-10-CM

## 2022-06-30 DIAGNOSIS — Z11.51 SCREENING FOR HPV (HUMAN PAPILLOMAVIRUS): ICD-10-CM

## 2022-06-30 DIAGNOSIS — Z00.00 MEDICARE ANNUAL WELLNESS VISIT, SUBSEQUENT: Primary | ICD-10-CM

## 2022-06-30 DIAGNOSIS — M70.862: ICD-10-CM

## 2022-06-30 DIAGNOSIS — Z71.89 ADVANCED DIRECTIVES, COUNSELING/DISCUSSION: ICD-10-CM

## 2022-06-30 DIAGNOSIS — T14.8XXD DELAYED WOUND HEALING: ICD-10-CM

## 2022-06-30 PROCEDURE — G8427 DOCREV CUR MEDS BY ELIG CLIN: HCPCS | Performed by: FAMILY MEDICINE

## 2022-06-30 PROCEDURE — 99213 OFFICE O/P EST LOW 20 MIN: CPT | Performed by: FAMILY MEDICINE

## 2022-06-30 PROCEDURE — G8756 NO BP MEASURE DOC: HCPCS | Performed by: FAMILY MEDICINE

## 2022-06-30 PROCEDURE — G8432 DEP SCR NOT DOC, RNG: HCPCS | Performed by: FAMILY MEDICINE

## 2022-06-30 PROCEDURE — G0439 PPPS, SUBSEQ VISIT: HCPCS | Performed by: FAMILY MEDICINE

## 2022-06-30 PROCEDURE — G8417 CALC BMI ABV UP PARAM F/U: HCPCS | Performed by: FAMILY MEDICINE

## 2022-06-30 PROCEDURE — G0463 HOSPITAL OUTPT CLINIC VISIT: HCPCS | Performed by: FAMILY MEDICINE

## 2022-06-30 RX ORDER — RISPERIDONE 3 MG/1
TABLET, FILM COATED ORAL
COMMUNITY
Start: 2022-04-27

## 2022-06-30 RX ORDER — ERGOCALCIFEROL 1.25 MG/1
CAPSULE ORAL
COMMUNITY
Start: 2022-04-30

## 2022-06-30 RX ORDER — BLOOD-GLUCOSE METER
KIT MISCELLANEOUS
COMMUNITY
Start: 2022-05-11

## 2022-06-30 RX ORDER — ARIPIPRAZOLE 10 MG/1
TABLET ORAL
COMMUNITY
Start: 2022-06-29

## 2022-06-30 RX ORDER — CLINDAMYCIN PHOSPHATE 10 MG/G
GEL TOPICAL
COMMUNITY
Start: 2022-04-29

## 2022-06-30 NOTE — PROGRESS NOTES
History of Present Illness:     Chief Complaint   Patient presents with    Complete Physical     Patient is coming in for a complete physical. No other concerns. Emilie Valerio is a 40 y.o. female     Non healing wounds. Applying various creams  Wounds present for > 1 year  No pain or redness, just non healing    PMH (REVIEWED):  Past Medical History:   Diagnosis Date    Anxiety disorder     Severe    Cellulitis, abdominal wall 5/18/2018    Congestive heart failure, unspecified     Diabetes ([facility] [address]     Hypertension     Ill-defined condition     lymphedema    Lymph edema     Obesity     Open wound of abdominal wall with complication 3/74/0767    Prader - Dala Plaster syndrome     Sleep apnea     cpap       Current Medications/Allergies (REVIEWED):     Current Outpatient Medications on File Prior to Visit   Medication Sig Dispense Refill    pantoprazole (PROTONIX) 40 mg tablet TAKE 1 TABLET BY MOUTH DAILY 90 Tablet 3    albuterol (PROVENTIL HFA, VENTOLIN HFA, PROAIR HFA) 90 mcg/actuation inhaler Take 1 Puff by inhalation every four (4) hours as needed for Wheezing. Dispense 8g inhaler. 8 g 3    mupirocin (BACTROBAN) 2 % ointment APPLY TO THE AFFECTED AREA THREE TIMES DAILY FOR 5 DAYS 22 g 3    nystatin (MYCOSTATIN) topical cream MIX WITH TRIAMCINOLONE CREAM AND APPLY TO THE AFFECTED AREA TWICE DAILY 240 g 5    ammonium lactate (AMLACTIN) 12 % topical cream APPLY TO THE AFFECTED AREA (feet, extremities, back, stomach) TWICE DAILY 385 g 3    ALPRAZolam (XANAX) 0.25 mg tablet Take 1 Tablet by mouth three (3) times daily as needed for Anxiety. Max Daily Amount: 0.75 mg. 90 Tablet 2    erythromycin (ILOTYCIN) ophthalmic ointment Administer to both eyes nightly as needed 3.5 g 1    nystatin (Nystop) powder APPLY TO THE AFFECTED AREA FOUR TIMES DAILY (Patient taking differently: Apply 1 Units to affected area four (4) times daily.  APPLY TO THE AFFECTED AREA (groin area) FOUR TIMES DAILY) 240 g 3    imipramine (TOFRANIL) 25 mg tablet TAKE 3 TABLETS BY MOUTH EVERY NIGHT AT BEDTIME 270 Tab 0    simvastatin (ZOCOR) 40 mg tablet Take 40 mg by mouth daily.  nystatin-triamcinolone (MYCOLOG II) topical cream APPLY EXTERNALLY TO THE AFFECTED AREA TWICE DAILY (Patient taking differently: Apply 1 Tube to affected area two (2) times a day. APPLY EXTERNALLY TO THE AFFECTED AREA (groin) TWICE DAILY) 240 g 3    insulin aspart U-100 (NOVOLOG) 100 unit/mL inpn 40 Units by SubCUTAneous route Before breakfast, lunch, dinner and at bedtime.  Adhesive Bandage 6 X 8 \" bndg 1 Each by Apply Externally route daily. Mepilex Transfers 6x8 inches. T14.8, 2x2cm open area with moderate drainage 1mm deep. 1 box. 1 Each 2    furosemide (LASIX) 40 mg tablet Take 40 mg by mouth daily.  naftifine (NAFTIN) 1 % topical cream APPLY EXTERNALLY TO THE AFFECTED AREA EVERY DAY FOR 2 WEEKS OR AS DIRECTED (Patient taking differently: Apply 1 g to affected area daily. APPLY EXTERNALLY TO THE AFFECTED AREA (toes, feet) EVERY DAY FOR 2 WEEKS OR AS DIRECTED) 60 g 0    insulin glargine (LANTUS,BASAGLAR) 100 unit/mL (3 mL) inpn by SubCUTAneous route two (2) times a day. 80mg am and 80mg pm.      furosemide (LASIX) 20 mg tablet TAKE 1 TABLET BY MOUTH EVERY DAY 90 Tab 0    fluticasone (FLONASE) 50 mcg/actuation nasal spray 2 Sprays by Both Nostrils route daily.  (Patient taking differently: 2 Sprays by Both Nostrils route two (2) times a day.) 1 Bottle 6    fenofibrate nanocrystallized (TRICOR) 145 mg tablet TAKE 1 TABLET BY MOUTH DAILY 90 Tab 0    FreeStyle Lite Strips strip USE AS DIRECTED THREE TIMES DAILY      ergocalciferol (ERGOCALCIFEROL) 1,250 mcg (50,000 unit) capsule TAKE 1 CAPSULE BY MOUTH ONCE WEEKLY      ARIPiprazole (ABILIFY) 10 mg tablet       clindamycin (CLINDAGEL) 1 % topical gel APPLY TO THE AFFECTED AREA DAILY AS NEEDED      risperiDONE (RisperDAL) 3 mg tablet       triamcinolone acetonide (KENALOG) 0.1 % topical cream MIX WITH NYSTATIN CREAM AND APPLY TO AFFECTED GROIN AREA TWICE DAILY (Patient not taking: Reported on 6/30/2022) 240 g 1    clotrimazole (GYNE-LOTRIMIN) 2 % vaginal cream Insert 1 Applicatorful into vagina nightly. 1 Tube 0    silver sulfADIAZINE (SILVADENE) 1 % topical cream Apply  to affected area daily. Apply daily as directed (Patient taking differently: Apply  to affected area as needed for Itching. Apply daily as directed to groin area) 85 g 1    hydrOXYzine (VISTARIL) 25 mg capsule TAKE 1 CAPSULE BY MOUTH THREE TIMES DAILY AS NEEDED FOR ITCHING 30 Cap 0     No current facility-administered medications on file prior to visit. Allergies   Allergen Reactions    Bactrim [Sulfamethoprim Ds] Other (comments)     Nausea, abdominal pain and cramping      Exenatide Rash         Review of Systems:     +Rash/ wound      Objective:     Vitals:    06/30/22 1344   Pulse: 96   Resp: 16   Temp: 97.7 °F (36.5 °C)   TempSrc: Oral   SpO2: 95%   Weight: 314 lb (142.4 kg)   Height: 4' 7\" (1.397 m)       Physical Exam:  General appearance - alert, well appearing, and in no distress and overweight  Skin - 2 pressure areas, 2 small non healing wounds                Recent Labs:  No results found for this or any previous visit (from the past 12 hour(s)). Assessment and Plan:       ICD-10-CM ICD-9-CM    1. Medicare annual wellness visit, subsequent  Z00.00 V70.0    2. Advanced directives, counseling/discussion  Z71.89 V65.49 FULL CODE   3. Morbid obesity ([facility] [address]  E66.01 278.01    4. Oth soft tissue disorders related to use/pressure, l low leg  M70.862 729.99 REFERRAL TO HOME HEALTH   5. Delayed wound healing  T14. 8XXD 879.9 REFERRAL TO HOME HEALTH   6. Screening for HPV (human papillomavirus)  Z11.51 V73.81 HPV, APTIMA HIGH 16/18,45       MWV  See nursing note  ACP completed    Pressure wounds/ non healing ulcers  Likely due to obesity, limited mobility in setting of Prader-Willi syndrome  Referred for [facility] wound care    Counseled on cervical cancer screening  Very low risk in this patient  No known sexual encounters  Sexually under developed 2/2 Prader-Willi Syndrome  No periods, no vaginal bleeding  Discussed cervical cancer screening with mother  Concerned that speculum exam would be traumatic experience for pt  We decided to attempt to obtain a vaginal swab and test for HPV; if negative that will provide reassurance    Will request labs from Endocrinologist - A1c, lipids, urine microalbumin    Follow up: 6 mo, sooner if needed    Andry Abraham MD      We discussed the expected course, resolution and complications of the diagnosis(es) in detail. Medication risks, benefits, costs, interactions, and alternatives were discussed as indicated. I advised her to contact the office if her condition worsens, changes or fails to improve as anticipated. She expressed understanding with the diagnosis(es) and plan. This is the Subsequent Medicare Annual Wellness Exam, performed 12 months or more after the Initial AWV or the last Subsequent AWV    I have reviewed the patient's medical history in detail and updated the computerized patient record. Assessment/Plan   Education and counseling provided:  Are appropriate based on today's review and evaluation  End-of-Life planning (with patient's consent)  Screening Pap and pelvic (covered once every 2 years)    1. Medicare annual wellness visit, subsequent  2. Advanced directives, counseling/discussion  -     FULL CODE  3. Morbid obesity ([address]  4. Oth soft tissue disorders related to use/pressure, l low leg  -     REFERRAL TO HOME HEALTH  5. Delayed wound healing  -     REFERRAL TO [address]  6. Screening for HPV (human papillomavirus)  -     HPV, APTIMA HIGH 16/18,45;  Future       Depression Risk Factor Screening     3 most recent PHQ Screens 9/16/2019   PHQ Not Done -   Little interest or pleasure in doing things Not at all   Feeling down, depressed, irritable, or hopeless Not at all   Total Score PHQ 2 0       Alcohol & Drug Abuse Risk Screen    Do you average more than 1 drink per night or more than 7 drinks a week:  No    On any one occasion in the past three months have you have had more than 3 drinks containing alcohol:  No          Functional Ability and Level of Safety    Hearing: Hearing is good. Activities of Daily Living: The home contains: handrails  Patient needs help with:  phone, transportation, shopping, preparing meals, laundry, housework, managing medications, managing money, dressing, bathing, hygiene and walking      Ambulation: with mild difficulty     Fall Risk:  Fall Risk Assessment, last 12 mths 2/8/2019   Able to walk? Yes   Fall in past 12 months?  No      Abuse Screen:  Patient is not abused       Cognitive Screening   Not performed due to known developmental delays 2/2 Prader-Willi syndrome     Health Maintenance Due     Health Maintenance Due   Topic Date Due    Depression Screen  Never done    Cervical cancer screen  Never done    Lipid Screen  11/15/2019    A1C test (Diabetic or Prediabetic)  05/29/2020    MICROALBUMIN Q1  07/30/2020    Foot Exam Q1  09/16/2020       Patient Care Team   Patient Care Team:  Nayan Fields MD as PCP - General (Family Medicine)  Nayan Fields MD as PCP - [facility] Empaneled Provider  Nakul Lozada MD as Physician (Sleep Medicine Physician)  Marcin Guerin MD as Consulting Provider (Pediatric Cardiology)  Chastity Myers DPM as Consulting Provider (Podiatry)    History     Patient Active Problem List   Diagnosis Code    Prader-Willi syndrome Q87.11    [facility] I50.9    Lymphedema I89.0    Anxiety disorder F41.9    Scoliosis M41.9    DM (diabetes mellitus), type 2 ([facility] [address] E11.9    HTN (hypertension) I10    Hyperlipidemia E78.5    Sleep apnea G47.30    Bleeding hemorrhoids K64.9    CKD (chronic kidney disease) N18.9    Urinary incontinence R32    MR (mental retardation), moderate F71    Psychosis ([facility] [address] F29    Closed fracture of right foot with routine healing S92.901D    Morbid obesity with BMI of 70 and over, adult ([facility] E66.01, Z68.45    Skin abscess L02.91    Non-prs chr ulc unsp prt of l low leg limited to brkdwn skin ([facility] L97.921     Past Medical History:   Diagnosis Date    Anxiety disorder     Severe    Cellulitis, abdominal wall 5/18/2018    Congestive heart failure, unspecified     Diabetes ([facility]     Hypertension     Ill-defined condition     lymphedema    Lymph edema     Obesity     Open wound of abdominal wall with complication 5/71/5640    Prader - Ellan Maria E syndrome     Sleep apnea     cpap      Past Surgical History:   Procedure Laterality Date    HX CHOLECYSTECTOMY  age 25   [de-identified] HEENT      HX TONSIL AND ADENOIDECTOMY  age 9   [de-identified] WISDOM TEETH EXTRACTION  10/1/13     Current Outpatient Medications   Medication Sig Dispense Refill    pantoprazole (PROTONIX) 40 mg tablet TAKE 1 TABLET BY MOUTH DAILY 90 Tablet 3    albuterol (PROVENTIL HFA, VENTOLIN HFA, PROAIR HFA) 90 mcg/actuation inhaler Take 1 Puff by inhalation every four (4) hours as needed for Wheezing. Dispense 8g inhaler. 8 g 3    mupirocin (BACTROBAN) 2 % ointment APPLY TO THE AFFECTED AREA THREE TIMES DAILY FOR 5 DAYS 22 g 3    nystatin (MYCOSTATIN) topical cream MIX WITH TRIAMCINOLONE CREAM AND APPLY TO THE AFFECTED AREA TWICE DAILY 240 g 5    ammonium lactate (AMLACTIN) 12 % topical cream APPLY TO THE AFFECTED AREA (feet, extremities, back, stomach) TWICE DAILY 385 g 3    ALPRAZolam (XANAX) 0.25 mg tablet Take 1 Tablet by mouth three (3) times daily as needed for Anxiety.  Max Daily Amount: 0.75 mg. 90 Tablet 2    erythromycin (ILOTYCIN) ophthalmic ointment Administer to both eyes nightly as needed 3.5 g 1    nystatin (Nystop) powder APPLY TO THE AFFECTED AREA FOUR TIMES DAILY (Patient taking differently: Apply 1 Units to affected area four (4) times daily. APPLY TO THE AFFECTED AREA (groin area) FOUR TIMES DAILY) 240 g 3    imipramine (TOFRANIL) 25 mg tablet TAKE 3 TABLETS BY MOUTH EVERY NIGHT AT BEDTIME 270 Tab 0    simvastatin (ZOCOR) 40 mg tablet Take 40 mg by mouth daily.  nystatin-triamcinolone (MYCOLOG II) topical cream APPLY EXTERNALLY TO THE AFFECTED AREA TWICE DAILY (Patient taking differently: Apply 1 Tube to affected area two (2) times a day. APPLY EXTERNALLY TO THE AFFECTED AREA (groin) TWICE DAILY) 240 g 3    insulin aspart U-100 (NOVOLOG) 100 unit/mL inpn 40 Units by SubCUTAneous route Before breakfast, lunch, dinner and at bedtime.  Adhesive Bandage 6 X 8 \" bndg 1 Each by Apply Externally route daily. Mepilex Transfers 6x8 inches. T14.8, 2x2cm open area with moderate drainage 1mm deep. 1 box. 1 Each 2    furosemide (LASIX) 40 mg tablet Take 40 mg by mouth daily.  naftifine (NAFTIN) 1 % topical cream APPLY EXTERNALLY TO THE AFFECTED AREA EVERY DAY FOR 2 WEEKS OR AS DIRECTED (Patient taking differently: Apply 1 g to affected area daily. APPLY EXTERNALLY TO THE AFFECTED AREA (toes, feet) EVERY DAY FOR 2 WEEKS OR AS DIRECTED) 60 g 0    insulin glargine (LANTUS,BASAGLAR) 100 unit/mL (3 mL) inpn by SubCUTAneous route two (2) times a day. 80mg am and 80mg pm.      furosemide (LASIX) 20 mg tablet TAKE 1 TABLET BY MOUTH EVERY DAY 90 Tab 0    fluticasone (FLONASE) 50 mcg/actuation nasal spray 2 Sprays by Both Nostrils route daily.  (Patient taking differently: 2 Sprays by Both Nostrils route two (2) times a day.) 1 Bottle 6    fenofibrate nanocrystallized (TRICOR) 145 mg tablet TAKE 1 TABLET BY MOUTH DAILY 90 Tab 0    FreeStyle Lite Strips strip USE AS DIRECTED THREE TIMES DAILY      ergocalciferol (ERGOCALCIFEROL) 1,250 mcg (50,000 unit) capsule TAKE 1 CAPSULE BY MOUTH ONCE WEEKLY      ARIPiprazole (ABILIFY) 10 mg tablet       clindamycin (CLINDAGEL) 1 % topical gel APPLY TO THE AFFECTED AREA DAILY AS NEEDED      risperiDONE (RisperDAL) 3 mg tablet       triamcinolone acetonide (KENALOG) 0.1 % topical cream MIX WITH NYSTATIN CREAM AND APPLY TO AFFECTED GROIN AREA TWICE DAILY (Patient not taking: Reported on 6/30/2022) 240 g 1    clotrimazole (GYNE-LOTRIMIN) 2 % vaginal cream Insert 1 Applicatorful into vagina nightly. 1 Tube 0    silver sulfADIAZINE (SILVADENE) 1 % topical cream Apply  to affected area daily. Apply daily as directed (Patient taking differently: Apply  to affected area as needed for Itching.  Apply daily as directed to groin area) 85 g 1    hydrOXYzine (VISTARIL) 25 mg capsule TAKE 1 CAPSULE BY MOUTH THREE TIMES DAILY AS NEEDED FOR ITCHING 30 Cap 0     Allergies   Allergen Reactions    Bactrim [Sulfamethoprim Ds] Other (comments)     Nausea, abdominal pain and cramping      Exenatide Rash       Family History   Problem Relation Age of Onset    Hypertension Mother     Hypertension Father     Mult Sclerosis Brother      Social History     Tobacco Use    Smoking status: Never Smoker    Smokeless tobacco: Never Used   Substance Use Topics    Alcohol use: No     Alcohol/week: 0.0 standard drinks         Grace Osman MD

## 2022-06-30 NOTE — PATIENT INSTRUCTIONS
Medicare Wellness Visit, Female     The best way to live healthy is to have a lifestyle where you eat a well-balanced diet, exercise regularly, limit alcohol use, and quit all forms of tobacco/nicotine, if applicable. Regular preventive services are another way to keep healthy. Preventive services (vaccines, screening tests, monitoring & exams) can help personalize your care plan, which helps you manage your own care. Screening tests can find health problems at the earliest stages, when they are easiest to treat. [facility] follows the current, evidence-based guidelines published by the [facility] ([facility]) when recommending preventive services for our patients. Because we follow these guidelines, sometimes recommendations change over time as research supports it. (For example, mammograms used to be recommended annually. Even though Medicare will still pay for an annual mammogram, the newer guidelines recommend a mammogram every two years for women of average risk). Of course, you and your doctor may decide to screen more often for some diseases, based on your risk and your co-morbidities (chronic disease you are already diagnosed with). Preventive services for you include:  - Medicare offers their members a free annual wellness visit, which is time for you and your primary care provider to discuss and plan for your preventive service needs. Take advantage of this benefit every year!  -All adults over the age of 72 should receive the recommended pneumonia vaccines. Current USPSTF guidelines recommend a series of two vaccines for the best pneumonia protection.   -All adults should have a flu vaccine yearly and a tetanus vaccine every 10 years.   -All adults age 48 and older should receive the shingles vaccines (series of two vaccines).       -All adults age 38-68 who are overweight should have a diabetes screening test once every three years.   -All adults born between [birth year] and 1965 should be screened once for Hepatitis C.  -Other screening tests and preventive services for persons with diabetes include: an eye exam to screen for diabetic retinopathy, a kidney function test, a foot exam, and stricter control over your cholesterol.   -Cardiovascular screening for adults with routine risk involves an electrocardiogram (ECG) at intervals determined by your doctor.   -Colorectal cancer screenings should be done for adults age 54-65 with no increased risk factors for colorectal cancer. There are a number of acceptable methods of screening for this type of cancer. Each test has its own benefits and drawbacks. Discuss with your doctor what is most appropriate for you during your annual wellness visit. The different tests include: colonoscopy (considered the best screening method), a fecal occult blood test, a fecal DNA test, and sigmoidoscopy.    -A bone mass density test is recommended when a woman turns 65 to screen for osteoporosis. This test is only recommended one time, as a screening. Some providers will use this same test as a disease monitoring tool if you already have osteoporosis. -Breast cancer screenings are recommended every other year for women of normal risk, age 54-69.  -Cervical cancer screenings for women over age 72 are only recommended with certain risk factors.      Here is a list of your current Health Maintenance items (your personalized list of preventive services) with a due date:  Health Maintenance Due   Topic Date Due    Depresssion Screening  Never done    Cervical cancer screen  Never done    Cholesterol Test   11/15/2019    Hemoglobin A1C    05/29/2020    Albumin Urine Test  07/30/2020    Diabetic Foot Care  09/16/2020

## 2022-06-30 NOTE — ACP (ADVANCE CARE PLANNING)
Advance Care Planning     Advance Care Planning (ACP) Physician/NP/PA Conversation      Date of Conversation: 6/30/2022  Conducted with: {Discussion Participants:42153::\"Patient with Decision Making Capacity\"}    Healthcare Decision Maker:     Primary Decision Maker: Shaquille Florian - Parent - [phone]    Secondary Decision Maker: Lyric Falcon - Sister - [phone]    Supplemental (Other) Decision Maker: Tamia Osullivan - Parent - [phone]  Click here to complete [address] including selection of the Healthcare Decision Maker Relationship (ie \"Primary\")        {Select if Healthcare Decision Makers in ACP Activity was empty/incomplete (Optional):45050}    Care Preferences:    Hospitalization: \"If your health worsens and it becomes clear that your chance of recovery is unlikely, what would be your preference regarding hospitalization? \"  {hospitalization preference:97966::\"The patient would prefer comfort-focused treatment without hospitalization. \"}    Ventilation: \"If you were unable to breathe on your own and your chance of recovery was unlikely, what would be your preference about the use of a ventilator (breathing machine) if it was available to you? \"   {ventilator preference:22019::\"The patient would NOT desire the use of a ventilator. \"}    Resuscitation: \"In the event your heart stopped as a result of an underlying serious health condition, would you want attempts to be made to restart your heart, or would you prefer a natural death? \"   {CPR preference:92219::\"Yes, attempt to resuscitate. \"}    {Additional topics discussed:42406}    Conversation Outcomes / Follow-Up Plan:   {ACP; outcomes:48084::\"ACP in process - information provided, considering goals and options\"}  Reviewed DNR/DNI and patient {ACP; DNR/DNI Review:21330::\"elects Full Code (Attempt Resuscitation)\"}     Length of Voluntary ACP Conversation in minutes:  {TIME; ACP time 16 min - 1 hour:06429::\"<16 minutes (Non-Billable)\"}    Bessy Art MD     {If the relationship to the patient does NOT follow your state's Next of Kin hierarchy, the patient must complete an ACP document to allow him/her to act on the patient's behalf. (Optional):111}    {If the patient has a valid advance directive AND verbally provides inconsistent care preference(s), advise the patient to either create a new advance directive or to orally revoke that prior directive (refer to ACP Clinical Specialist).  (Optional):111}

## 2022-06-30 NOTE — PROGRESS NOTES
Debra Adkins is a 40 y.o. female    Chief Complaint   Patient presents with    Complete Physical     Patient is coming in for a complete physical. No other concerns. 1. Have you been to the ER, urgent care clinic since your last visit? Hospitalized since your last visit? No  2. Have you seen or consulted any other health care providers outside of the [address] [place] [address] since your last visit? Include any pap smears or colon screening. No      Visit Vitals  Pulse 96   Temp 97.7 °F (36.5 °C) (Oral)   Resp 16   Ht 4' 7\" (1.397 m)   Wt 314 lb (142.4 kg)   SpO2 95%   BMI 72.98 kg/m²           Health Maintenance Due   Topic Date Due    Hepatitis C Screening  Never done    Depression Screen  Never done    Cervical cancer screen  Never done    Medicare Yearly Exam  06/15/2019    Lipid Screen  11/15/2019    A1C test (Diabetic or Prediabetic)  05/29/2020    MICROALBUMIN Q1  07/30/2020    Foot Exam Q1  09/16/2020         Medication Reconciliation completed, changes noted. Please  Update medication list.  Debra Adkins is a 40 y.o. female    [address] [facility] Questions   -During the past 4 weeks:   -how would you rate your health in general? Good   -how often have you been bothered by feeling dizzy when standing up? None   -how much have you been bothered by bodily pain? moderately   -Have you noticed any hearing difficulties? no   -has your physical and emotional health limited your social activities with family or friends? yes    Emotional Health Questions   -Do you have a history of depression, anxiety, or emotional problems? no  -Over the past 2 weeks, have you felt down, depressed or hopeless? no  -Over the past 2 weeks, have you felt little interest or pleasure in doing things? no    Health Habits   Please describe your diet habits: Patient did not answer. Do you get 5 servings of fruits or vegetables daily? yes  Do you exercise regularly?  yes    Activities of Daily Living and Functional Status -Do you need help with eating, walking, dressing, bathing, toileting, the phone, transportation, shopping, preparing meals, housework, laundry, medications or managing money? yes  -In the past four weeks, was someone available to help you if you needed and wanted help with anything? yes  -Are you confident are you that you can control and manage most of your health problems? no  -Have you been given information to help you keep track of your medications? yes and n/a  -How often do you have trouble taking your medications as prescribed? never    Fall Risk and Home Safety   Have you fallen 2 or more times in the past year? no  Does your home have rugs in the hallways? no, Do you have grab bars in the bathrooms? yes, Does your home have handrails on the stairs? N/A, Do you have adequate lighting in your home? Patient did not answer. Do you have smoke detectors and check them regularly? Patient did not answer.    Do you have difficulties driving a car/vehicle? n/a  Do you always fasten your seat belt when you are in a car? yes

## 2022-08-08 ENCOUNTER — TELEPHONE (OUTPATIENT)
Dept: FAMILY MEDICINE CLINIC | Age: 37
End: 2022-08-08

## 2022-08-08 DIAGNOSIS — M70.862: ICD-10-CM

## 2022-08-08 DIAGNOSIS — T14.8XXD DELAYED WOUND HEALING: Primary | ICD-10-CM

## 2022-08-08 NOTE — TELEPHONE ENCOUNTER
----- Message from Rashid Ohara LPN sent at 1/8/7472 10:21 AM EDT -----  Good morning,      I've been trying to the call the office but for some reason no one picks up the phone. Just letting you all know that we cannot accept the referral. The order is too old, it was dated 6/30/22. I am not sure if the wound is healed by now.  You may send another order for us to review and send it to my admin for approval.   I am cancelling the referral.    Thank you

## 2022-08-08 NOTE — TELEPHONE ENCOUNTER
Contacted by Nurse Izetta Gaucher regarding  [facility] order. Will place new order for review.      Tsering Roman MD

## 2022-08-24 NOTE — PROGRESS NOTES
Malgorzata Williamson is a 28 y.o. 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    CHF (congestive heart failure) ([facility] I50.9    Lymphedema I89.0    Anxiety disorder F41.9    Scoliosis M41.9    DM (diabetes mellitus), type 2 ([facility] E11.9    HTN (hypertension) I10    Hyperlipidemia E78.5    Sinusitis J32.9    Sleep apnea G47.30    Bleeding hemorrhoids K64.9    ARF (acute renal failure) (HCC) N17.9    CKD (chronic kidney disease) N18.9    Ingrown toenail without infection L60.0    Urinary incontinence R32    MR (mental retardation), moderate F71    Psychosis F29       Medications:  Current Outpatient Prescriptions   Medication Sig Dispense Refill    pantoprazole (PROTONIX) 40 mg tablet Take 1 Tab by mouth daily. 90 Tab 1    naftifine (NAFTIN) 1 % topical cream APPLY EXTERNALLY TO THE AFFECTED AREA EVERY DAY FOR 2 WEEKS OR AS DIRECTED 60 g 0    nystatin-triamcinolone (MYCOLOG II) topical cream APPLY EXTERNALLY TO THE AFFECTED AREA TWICE DAILY 240 g 0    ammonium lactate (AMLACTIN) 12 % topical cream APPLY TOPICALLY TO THE AFFECTED AREA TWICE DAILY AND RUB WELL 385 g 0    ALPRAZolam (XANAX) 0.25 mg tablet TAKE 1 TABLET BY MOUTH THREE TIMES DAILY AS NEEDED 90 Tab 0    furosemide (LASIX) 20 mg tablet TAKE 1 TABLET BY MOUTH EVERY DAY 90 Tab 0    risperiDONE (RISPERDAL) 1 mg tablet TAKE 1 TABLET BY MOUTH TWICE DAILY 60 Tab 0    Adhesive Bandage (TENDRA MEPILEX BORDER) 6 X 8 \" bndg 1 Each by Apply Externally route daily.  One, T14.8, 2x4cm open area with bloody exudate 60 Each 12    traMADol (ULTRAM) 50 mg tablet Take 50 mg by mouth [facility] Office Note      Patient:   Bruce Vega  MR#:    [id number]  Account Number:                         YOB: 1956  Date of Evaluation:  8/24/2022  Time of Note:                          12:04 PM  Primary/Referring Physician:  Lisa Luke MD   Consulting Physician:  Christiano Lee DNP, MAMADOU    NEW PATIENT CONSULTATION    Chief Complaint   Patient presents with    New Patient     C/o of fall in may that has increased the frequency of headaches, dizziness and loss of balance. Husbands states when headaches are bad pt has slurred speech     Dizziness    Headache     HISTORY OF PRESENT ILLNESS    Bruce Vega is a 72y.o. year old female here for evaluation dizziness, falls, gait changes, increase in headaches. She has noted these concerns increasing since May 2022, had a fall and struck her head on the nightstand. No clear LOC. Dizziness worsens when looking upwards. Feels like her head is swirling. She denies nausea. No clear imbalance with dizziness. No associated falls. She feels imbalanced, this waxes and wanes. She veers to the left when imbalanced. Does not correlate dizziness with imbalance. Occurs several times in a day. She does correlate falls with imbalance. Has noted worsening headaches as well. Headache pain is posterior with little radiation of pain. She denies nausea, vomiting, light/sound sensitivity. Denies vision changes with headaches. Denies focal symptoms with headaches.  has noted slurred speech with more severe headaches. Will take Ibuprofen for headaches with improvement. Has had to take Tylenol #3 for a few headaches. She has a history of migraines which are fairly well controlled with Inderal. She has tried and failed Imitrex but had a severe reaction to this. Has had some memory concerns as well. Primarily short term. Might forget her thought in the middle of talking. Not interfering with ADLs. Taking medications without difficulty.  No family every six (6) hours as needed for Pain.  meloxicam (MOBIC) 7.5 mg tablet Take 1 Tab by mouth daily. Indications: OSTEOARTHRITIS 10 Tab 0    diclofenac (VOLTAREN) 1 % gel Apply  to affected area four (4) times daily. 1 Each 11    mupirocin calcium (BACTROBAN) 2 % topical cream APPLY EXTERNALLY TO THE AFFECTED AREA EVERY DAY AS DIRECTED 30 g 0    hydrOXYzine (VISTARIL) 25 mg capsule TAKE 1 CAPSULE BY MOUTH THREE TIMES DAILY AS NEEDED FOR ITCHING 30 Cap 0    fexofenadine (ALLEGRA) 180 mg tablet Take 1 Tab by mouth daily. 30 Tab 11    fluticasone (FLONASE) 50 mcg/actuation nasal spray 2 Sprays by Both Nostrils route daily. 1 Bottle 6    fenofibrate nanocrystallized (TRICOR) 145 mg tablet TAKE 1 TABLET BY MOUTH DAILY 90 Tab 0       Allergies: Allergies   Allergen Reactions    Bactrim [Sulfamethoprim Ds] Other (comments)     Nausea, abdominal pain and cramping      Byetta [Exenatide] Rash       LMP:  No LMP recorded. Social History     Social History    Marital status: SINGLE     Spouse name: N/A    Number of children: N/A    Years of education: N/A     Occupational History    Not on file. Social History Main Topics    Smoking status: Never Smoker    Smokeless tobacco: Never Used    Alcohol use No    Drug use: No    Sexual activity: No     Other Topics Concern    Not on file     Social History Narrative         Family History   Problem Relation Age of Onset    Hypertension Mother     Hypertension Father     MS Brother      Other family history:            ROS:  Headaches:  no  Chest Pain:  no  SOB:  no  Fevers:  no  Other significant ROS:      No LMP recorded.     Physical Exam  Visit Vitals    Pulse 86    Temp 97.3 °F (36.3 °C) (Oral)    Resp 18    Ht 4' 7\" (1.397 m)    Wt 278 lb (126.1 kg)    SpO2 96%    BMI 64.61 kg/m2     BP Readings from Last 3 Encounters:   05/26/16 130/80   04/26/16 129/62   07/23/15 104/80     Constitutional:  Appears well,  No Acute Distress, Vitals history of memory loss. She had a recent MRI brain that was c/w , atrophy, nothing acute. History reviewed. No pertinent past medical history. Past Surgical History:   Procedure Laterality Date    APPENDECTOMY      GASTRIC MOTILITY STUDY      HYSTERECTOMY, VAGINAL         History reviewed. No pertinent family history. Social History     Socioeconomic History    Marital status: Unknown     Spouse name: Not on file    Number of children: Not on file    Years of education: Not on file    Highest education level: Not on file   Occupational History    Not on file   Tobacco Use    Smoking status: Never    Smokeless tobacco: Never   Vaping Use    Vaping Use: Never used   Substance and Sexual Activity    Alcohol use: Not Currently    Drug use: Never    Sexual activity: Not on file   Other Topics Concern    Not on file   Social History Narrative    Not on file     Social Determinants of Health     Financial Resource Strain: Not on file   Food Insecurity: Not on file   Transportation Needs: Not on file   Physical Activity: Not on file   Stress: Not on file   Social Connections: Not on file   Intimate Partner Violence: Not on file   Housing Stability: Not on file       Current Outpatient Medications   Medication Sig Dispense Refill    propranolol (INDERAL LA) 160 MG extended release capsule Take 160 mg by mouth daily      FLUoxetine (PROZAC) 20 MG capsule TAKE 1 CAPSULE BY MOUTH TWICE DAILY      calcium carb-cholecalciferol 600-400 MG-UNIT TABS per tab Take by mouth daily      atorvastatin (LIPITOR) 10 MG tablet TAKE 1 TABLET (10 MG TOTAL) BY MOUTH DAILY      ALPRAZolam (XANAX) 0.25 MG tablet Take 1 tablet by mouth 2 times daily.       Multiple Vitamin (ONCE DAILY) TABS Take 1 tablet by mouth daily      rOPINIRole (REQUIP) 1 MG tablet TAKE 1 TABLET BY MOUTH NIGHTLY      meclizine (ANTIVERT) 25 MG tablet TAKE 1 TABLET (25 MG TOTAL) BY MOUTH 2 (TWO) TIMES A DAY AS NEEDED FOR DIZZINESS      hydrOXYzine pamoate (VISTARIL) 50 MG capsule TAKE 1 CAPSULE BY MOUTH THREE TIMES A DAY AS NEEDED      torsemide (DEMADEX) 20 MG tablet Take 20 mg by mouth daily      Ubrogepant (UBRELVY) 100 MG TABS Take 1 tablet at the onset of migraine. May repeat once in 2 hours if no improvement. Do not exceed 2 tablets in 24 hours. 16 tablet 3     No current facility-administered medications for this visit. Allergies   Allergen Reactions    Sulfa Antibiotics Hives      -     Lorazepam      depression   -       Meclofenamate       -        REVIEW OF SYSTEMS  Constitutional: []Fever []Sweats []Chills [] Recent Injury [x] Denies all unless marked  HEENT:[x]Headache  [] Head Injury [] Hearing Loss  [] Sore Throat  [] Ear Ache [x] Denies all unless marked  Spine:  [] Neck pain  [] Back pain  [] Sciaticia  [x] Denies all unless marked  Cardiovascular:[]Heart Disease []Palpitations [] Chest Pain   [x] Denies all unless marked  Pulmonary: []Shortness of Breath []Cough   [x] Denies all unless marked  Psychiatric/Behavioral:[] Depression [] Anxiety [x] Denies all unless marked  Gastrointestinal: []Nausea  []Vomiting  []Abdominal Pain  []Constipation  []Diarrhea  [x] Denies all unless marked  Genitourinary:   [] Frequency  [] Urgency  [] Dysuria [] Incontinence  [x] Denies all unless marked  Extremities: []Pain  []Swelling  [x] Denies all unless marked  Musculoskeletal: [] Myalgias  [] Joint Pain  [] Arthritis [] Muscle Cramps [] Muscle Twitches  [x] Denies all unless marked  Sleep: []Insomnia[]Snoring []Restless Legs  []Sleep Apnea  []Daytime Sleepiness  [x] Denies all unless marked  Skin:[] Rash [] Color Change [x] Denies all unless marked   Neurological:[]Visual Disturbance [] Memory Loss [x]Loss of Balance [x]Slurred Speech []Weakness []Seizures  [x] Dizziness [x] Denies all unless marked     The MA has completed the ROS with the patient. I have reviewed it in its' entirety with the patient and agree with the documentation.      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    CHF (congestive heart failure) ([facility] I50.9    Lymphedema I89.0    Anxiety disorder F41.9    Scoliosis M41.9    DM (diabetes mellitus), type 2 ([facility] [address] E11.9    HTN (hypertension) I10    Hyperlipidemia E78.5    Sinusitis J32.9    Sleep apnea G47.30    Bleeding hemorrhoids K64.9    ARF (acute renal failure) ([facility]) N17.9    CKD (chronic kidney disease) N18.9    Ingrown toenail without infection L60.0    Urinary incontinence R32    MR (mental retardation), moderate F71    Psychosis F29       Today's diagnoses are:    ICD-10-CM ICD-9-CM    1. Nontraumatic tear of skin T14.8 879.8    2. 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       See patient instructions  Patient Instructions   Use mepilex and wound care as we discussed        refresh note:  done    AVS Printed:  done 125/69   Pulse 51   Temp 98.9 °F (37.2 °C)   Ht 5' (1.524 m)   Wt 188 lb (85.3 kg)   SpO2 100%   BMI 36.72 kg/m²       Constitutional - No acute distress    HEENT- Conjunctiva normal.  No scars, masses, or lesions over external nose or ears, no neck masses noted, no jugular vein distension, no bruit  Cardiac- Regular rate and rhythm  Pulmonary- Good expansion, normal effort without use of accessory muscles  Musculoskeletal - No significant wasting of muscles noted, no bony deformities  Extremities - No clubbing, cyanosis or edema  Skin - Warm, dry, and intact. No rash, erythema, or pallor  Psychiatric - Mood, affect, and behavior appear normal      NEUROLOGICAL EXAM     Mental status   [x] Awake, alert, oriented   [x]Affect attention and concentration appear appropriate  [x]Recent and remote memory appears unremarkable  [x]Speech normal without dysarthria or aphasia, comprehension and repetition intact. COMMENTS:    Cranial Nerves [x]No VF deficit to confrontation,  no papilledema on fundoscopic exam.  [x]PERRLA, EOMI, no nystagmus, conjugate eye movements, no ptosis  [x]Face symmetric  [x]Facial sensation intact  [x]Tongue midline no atrophy or fasciculations present  [x]Palate midline, hearing to finger rub normal bilaterally  [x]Shoulder shrug and SCM testing normal bilaterally  COMMENTS:   Motor   [x]5/5 strength x 4 extremities  [x]Normal bulk and tone  [x]No tremor present  [x]No rigidity or bradykinesia noted  COMMENTS:   Sensory  [x]Sensation intact to light touch, pin prick, vibration, and proprioception BLE  [x]Sensation intact to light touch, pin prick, vibration, and proprioception BUE  COMMENTS:   Coordination [x]FTN normal bilaterally   [x]HTS normal bilaterally  [x]PLACIDO normal bilaterally.    COMMENTS:   Reflexes  [x]Symmetric and non-pathological  [x]Toes down going bilaterally  [x]No clonus present  COMMENTS:   Gait                  [x]Normal steady gait    []Ataxic    []Spastic []Magnetic     []Shuffling  COMMENTS:       LABS RECORD AND IMAGING REVIEW (As below and per HPI)    No results found for: [id number]  No results found for: WBC, HGB, HCT, MCV, PLT  No results found for: NA, K, CL, CO2, BUN, CREATININE, GLUCOSE, CALCIUM, PROT, LABALBU, BILITOT, ALKPHOS, AST, ALT, LABGLOM, GFRAA, AGRATIO, GLOB  No results found for: CHOL, TRIG, HDL, LDLCALC  No results found for: TSH, T4FREE  No results found for: CRP, SEDRATE     MRI brain (2022)- ; mild to moderate atrophy; partially empty sella     Reviewed referral records     ASSESSMENT:    Kay Blackman is a 72y.o. year old female here for evaluation of headaches, dizziness, memory changes and abnormal MRI brain. She has a history of headaches but these worsened after a fall in May. She developed memory changes and dizziness following this as well. Exam is non focal today. MRI brain with  and partially empty sella. Suspect this is normal variant given patient age. Suspect that complaints are post concussive in nature. Will follow clinically for now. ICD-10-CM    1. Other headache syndrome  G44.89       2. Dizziness  R42       3. Memory change  R41.3           PLAN:   Labs from PCP. Consider additional memory labs/work up if no improvement at follow up   Ubrelvy 100mg prn. Discussed side effects with patient. Continue Propranolol, long term med for migraine prophylaxis. Follow along clinically. Discussed post concussion, symptoms will improve with time but timeline is unclear. 5.   Return in about 3 months (around 2022) for follow up, sooner if worsening.     Inna Robles DNP, APRN

## 2022-09-09 DIAGNOSIS — R23.8 SKIN IRRITATION: ICD-10-CM

## 2022-09-09 RX ORDER — TRIAMCINOLONE ACETONIDE 1 MG/G
CREAM TOPICAL
Qty: 240 G | Refills: 1 | Status: SHIPPED | OUTPATIENT
Start: 2022-09-09

## 2022-09-15 ENCOUNTER — PATIENT MESSAGE (OUTPATIENT)
Dept: FAMILY MEDICINE CLINIC | Age: 37
End: 2022-09-15

## 2022-10-03 DIAGNOSIS — R23.8 SKIN IRRITATION: ICD-10-CM

## 2022-10-03 RX ORDER — AMMONIUM LACTATE 12 G/100G
CREAM TOPICAL
Qty: 385 G | Refills: 3 | Status: SHIPPED | OUTPATIENT
Start: 2022-10-03

## 2022-10-25 ENCOUNTER — PATIENT MESSAGE (OUTPATIENT)
Dept: FAMILY MEDICINE CLINIC | Age: 37
End: 2022-10-25

## 2022-11-17 DIAGNOSIS — R23.8 SKIN IRRITATION: ICD-10-CM

## 2022-11-17 RX ORDER — NYSTATIN 100000 [USP'U]/G
POWDER TOPICAL
Qty: 240 G | Refills: 3 | Status: SHIPPED | OUTPATIENT
Start: 2022-11-17

## 2022-12-10 DIAGNOSIS — H10.409 CHRONIC CONJUNCTIVITIS, UNSPECIFIED CHRONIC CONJUNCTIVITIS TYPE, UNSPECIFIED LATERALITY: ICD-10-CM

## 2022-12-10 RX ORDER — ERYTHROMYCIN 5 MG/G
OINTMENT OPHTHALMIC
Qty: 3.5 G | Refills: 1 | Status: SHIPPED | OUTPATIENT
Start: 2022-12-10

## 2022-12-30 DIAGNOSIS — R23.8 SKIN IRRITATION: ICD-10-CM

## 2022-12-30 RX ORDER — NYSTATIN 100000 [USP'U]/G
POWDER TOPICAL
Qty: 240 G | Refills: 3 | Status: SHIPPED | OUTPATIENT
Start: 2022-12-30

## 2023-01-17 DIAGNOSIS — S31.109S OPEN WOUND OF ANTERIOR ABDOMINAL WALL, SEQUELA: ICD-10-CM

## 2023-01-17 RX ORDER — MUPIROCIN 20 MG/G
OINTMENT TOPICAL
Qty: 22 G | Refills: 3 | Status: SHIPPED | OUTPATIENT
Start: 2023-01-17

## 2023-01-31 DIAGNOSIS — R23.8 SKIN IRRITATION: ICD-10-CM

## 2023-01-31 RX ORDER — TRIAMCINOLONE ACETONIDE 1 MG/G
CREAM TOPICAL
Qty: 240 G | Refills: 1 | Status: SHIPPED | OUTPATIENT
Start: 2023-01-31

## 2023-02-05 DIAGNOSIS — R23.8 SKIN IRRITATION: ICD-10-CM

## 2023-02-06 RX ORDER — AMMONIUM LACTATE 12 G/100G
CREAM TOPICAL
Qty: 385 G | Refills: 11 | Status: SHIPPED | OUTPATIENT
Start: 2023-02-06

## 2023-02-07 RX ORDER — NYSTATIN 100000 U/G
CREAM TOPICAL
Qty: 240 G | Refills: 5 | Status: SHIPPED | OUTPATIENT
Start: 2023-02-07

## 2023-02-15 ENCOUNTER — DOCUMENTATION ONLY (OUTPATIENT)
Dept: FAMILY MEDICINE CLINIC | Age: 38
End: 2023-02-15

## 2023-02-15 NOTE — PROGRESS NOTES
ior Authorization  Prior auth submitted via [email or web address]    Medication Name/ Dosage: Nystatin [id number] Unit/GM Cream     Quantity/Day Supply: 240 grams/30 Days     DX: L97.921    Reference Number: Key: Carla PAULINO Case ID: [id number] - Rx #: [id number]    Outcome: Approved,  Start Date:01/16/2023 - End Date:02/15/2024    If Denied Reason for Denial:

## 2023-03-03 NOTE — TELEPHONE ENCOUNTER
Returned call to nurse Charlene Hamilton, call transferred. X Size Of Lesion In Cm (Optional): 0 Detail Level: Detailed Introduction Text (Please End With A Colon): The following procedure was deferred:

## 2023-03-07 NOTE — TELEPHONE ENCOUNTER
Voicemail left on message for a return call to the office in regards to letter received for Dr Pranav Sierra, more information is needed. Note:    [facility]  requesting documentation for a beneficiary's item(s) to be eligible for reimbursement the beneficiary must have a qualifying wound. SSKI Counseling:  I discussed with the patient the risks of SSKI including but not limited to thyroid abnormalities, metallic taste, GI upset, fever, headache, acne, arthralgias, paraesthesias, lymphadenopathy, easy bleeding, arrhythmias, and allergic reaction.

## 2023-03-16 LAB — HBA1C MFR BLD HPLC: 6.5 %

## 2023-03-18 DIAGNOSIS — R23.8 SKIN IRRITATION: ICD-10-CM

## 2023-03-20 ENCOUNTER — TELEPHONE (OUTPATIENT)
Dept: FAMILY MEDICINE CLINIC | Age: 38
End: 2023-03-20

## 2023-03-20 RX ORDER — TRIAMCINOLONE ACETONIDE 1 MG/G
CREAM TOPICAL
Qty: 240 G | Refills: 1 | Status: SHIPPED | OUTPATIENT
Start: 2023-03-20

## 2023-03-20 NOTE — TELEPHONE ENCOUNTER
This writer attempted to contact [address] [place] [address] in reference to message. This writer was not able to reach Santiago Ok at this time. A detail message was left on a secure voicemail to contact the office.

## 2023-03-20 NOTE — TELEPHONE ENCOUNTER
Incoming call from Robson Simms with [employer]. Per Moshe Lloyd, she is currently the  manager for [employer],  States due to patient's intellectual disability, her mother is currently her primary paid caregiver. Due to her current state waiver, consumer [facility] services is requiring documentation or a letter to justify why her mother is her primary care giver and why she is required to have a primary care given written and signed by her primary care physician. Moshe Lloyd states she will fax over a brief summary and a letter template for further clarification.      Documentation received via fax offering further clarification of request.

## 2023-03-20 NOTE — TELEPHONE ENCOUNTER
Alan Bolanos, , would like to speak with you re: care for pt and need to know if you would be willing to sign off on a letter pertaining to her care. Thank you.

## 2023-04-05 RX ORDER — PANTOPRAZOLE SODIUM 40 MG/1
TABLET, DELAYED RELEASE ORAL
Qty: 90 TABLET | Refills: 3 | Status: SHIPPED
Start: 2023-04-05

## 2023-05-22 ENCOUNTER — OFFICE VISIT (OUTPATIENT)
Age: 38
End: 2023-05-22
Payer: MEDICARE

## 2023-05-22 VITALS
TEMPERATURE: 98.6 F | OXYGEN SATURATION: 95 % | HEIGHT: 55 IN | BODY MASS INDEX: 67.81 KG/M2 | HEART RATE: 89 BPM | WEIGHT: 293 LBS | RESPIRATION RATE: 20 BRPM

## 2023-05-22 DIAGNOSIS — Z00.00 MEDICARE ANNUAL WELLNESS VISIT, SUBSEQUENT: Primary | ICD-10-CM

## 2023-05-22 DIAGNOSIS — Z71.89 ACP (ADVANCE CARE PLANNING): ICD-10-CM

## 2023-05-22 DIAGNOSIS — E11.29 TYPE 2 DIABETES MELLITUS WITH OTHER DIABETIC KIDNEY COMPLICATION (HCC): ICD-10-CM

## 2023-05-22 DIAGNOSIS — I50.9 CONGESTIVE HEART FAILURE, UNSPECIFIED HF CHRONICITY, UNSPECIFIED HEART FAILURE TYPE (HCC): ICD-10-CM

## 2023-05-22 PROBLEM — S92.909A CLOSED FRACTURE OF FOOT: Status: ACTIVE | Noted: 2017-09-13

## 2023-05-22 PROBLEM — L97.909 CHRONIC SKIN ULCER OF LOWER LEG (HCC): Status: RESOLVED | Noted: 2021-09-15 | Resolved: 2023-05-22

## 2023-05-22 PROBLEM — L97.909 CHRONIC SKIN ULCER OF LOWER LEG (HCC): Status: ACTIVE | Noted: 2021-09-15

## 2023-05-22 PROCEDURE — 3046F HEMOGLOBIN A1C LEVEL >9.0%: CPT | Performed by: FAMILY MEDICINE

## 2023-05-22 PROCEDURE — G0439 PPPS, SUBSEQ VISIT: HCPCS | Performed by: FAMILY MEDICINE

## 2023-05-22 RX ORDER — ASPIRIN 81 MG/1
TABLET ORAL
COMMUNITY

## 2023-05-22 ASSESSMENT — PATIENT HEALTH QUESTIONNAIRE - PHQ9
2. FEELING DOWN, DEPRESSED OR HOPELESS: 0
1. LITTLE INTEREST OR PLEASURE IN DOING THINGS: 0
SUM OF ALL RESPONSES TO PHQ QUESTIONS 1-9: 0
SUM OF ALL RESPONSES TO PHQ9 QUESTIONS 1 & 2: 0
SUM OF ALL RESPONSES TO PHQ QUESTIONS 1-9: 0

## 2023-05-22 ASSESSMENT — LIFESTYLE VARIABLES
HOW OFTEN DO YOU HAVE A DRINK CONTAINING ALCOHOL: NEVER
HOW MANY STANDARD DRINKS CONTAINING ALCOHOL DO YOU HAVE ON A TYPICAL DAY: PATIENT DOES NOT DRINK

## 2023-05-22 NOTE — PROGRESS NOTES
Medicare Annual Wellness Visit    Tami Cannon is here for Medicare AWV    Assessment & Plan   Medicare annual wellness visit, subsequent  Type 2 diabetes mellitus with other diabetic kidney complication ([facility]  Congestive heart failure, unspecified HF chronicity, unspecified heart failure type ([facility] [address] [facility]  Body mass index (BMI) 70 or greater, adult ([facility] [address] [facility]  ACP (advance care planning)  -     FULL CODE    Recommendations for Preventive Services Due: see orders and patient instructions/AVS.  Recommended screening schedule for the next 5-10 years is provided to the patient in written form: see Patient Instructions/AVS.     Return in about 1 year (around 2024) for Medicare Wellness Visit. Subjective   {OPTIONAL - WILL AUTO-DELETE IF NOT [facility]:[id number]}    Patient's complete Health Risk Assessment and screening values have been reviewed and are found in Flowsheets. The following problems were reviewed today and where indicated follow up appointments were made and/or referrals ordered. Positive Risk Factor Screenings with Interventions:                 Weight and Activity:  Physical Activity: Sufficiently Active    Days of Exercise per Week: 7 days    Minutes of Exercise per Session: 30 min     On average, how many days per week do you engage in moderate to strenuous exercise (like a brisk walk)?: 7 days  Have you lost any weight without trying in the past 3 months?: No  Body mass index is 72.52 kg/m². (!) Abnormal    Obesity Interventions:  See AVS for additional education material    {Optional - Use if Billing for Obesity Counselin}        Vision Screen:  Do you have difficulty driving, watching TV, or doing any of your daily activities because of your eyesight?: No  Have you had an eye exam within the past year?: (!) No  No results found.     Interventions:   See AVS for additional education material     ADL's:   Patient reports needing help with:  Select all that apply: (!) Eating, Dressing,

## 2023-05-22 NOTE — PATIENT INSTRUCTIONS
Personalized Preventive Plan for Cathi Hennessy - 5/22/2023  Medicare offers a range of preventive health benefits. Some of the tests and screenings are paid in full while other may be subject to a deductible, co-insurance, and/or copay. Some of these benefits include a comprehensive review of your medical history including lifestyle, illnesses that may run in your family, and various assessments and screenings as appropriate. After reviewing your medical record and screening and assessments performed today your provider may have ordered immunizations, labs, imaging, and/or referrals for you. A list of these orders (if applicable) as well as your Preventive Care list are included within your After Visit Summary for your review. Other Preventive Recommendations:    A preventive eye exam performed by an eye specialist is recommended every 1-2 years to screen for glaucoma; cataracts, macular degeneration, and other eye disorders. A preventive dental visit is recommended every 6 months. Try to get at least 150 minutes of exercise per week or 10,000 steps per day on a pedometer . Order or download the FREE \"Exercise & Physical Activity: Your Everyday Guide\" from [employer]. Call 8-358.856.7135 or search [employer] online. You need 7628-8698 mg of calcium and 6369-5494 IU of vitamin D per day. It is possible to meet your calcium requirement with diet alone, but a vitamin D supplement is usually necessary to meet this goal.  When exposed to the sun, use a sunscreen that protects against both UVA and UVB radiation with an SPF of 30 or greater. Reapply every 2 to 3 hours or after sweating, drying off with a towel, or swimming. Always wear a seat belt when traveling in a car. Always wear a helmet when riding a bicycle or motorcycle.

## 2023-05-22 NOTE — PROGRESS NOTES
Medicare Annual Wellness Visit    Cathi Hennessy is here for Medicare AWV    Assessment & Plan   Medicare annual wellness visit, subsequent  Type 2 diabetes mellitus with other diabetic kidney complication ([facility]  Congestive heart failure, unspecified HF chronicity, unspecified heart failure type ([facility]  Body mass index (BMI) 70 or greater, adult ([facility]  ACP (advance care planning)  -     FULL CODE    Recommendations for Preventive Services Due: see orders and patient instructions/AVS.  Recommended screening schedule for the next 5-10 years is provided to the patient in written form: see Patient Instructions/AVS.     Return in about 1 year (around 5/22/2024) for Medicare Wellness Visit. Subjective   The following acute and/or chronic problems were also addressed today:  Eye exam to be scheduled  Followed by podiatry, Dr. Leroy Terry  Now followed by adult cardiology, Dr. Lauryn Moraes by Dr. Mychal Rios for Nephrology, reviewed labs from 3/2023 in Care Everywhere  Followed by Dr. Rakel Lang for Endocrinology annually    Patient's complete Health Risk Assessment and screening values have been reviewed and are found in Flowsheets. The following problems were reviewed today and where indicated follow up appointments were made and/or referrals ordered. Positive Risk Factor Screenings with Interventions:                 Weight and Activity:  Physical Activity: Not on file           Body mass index is 72.52 kg/m². (!) Abnormal  Obesity Interventions:  Underlying genetic disorder contributing to weight gain/ severe obesity. Weight stable over the past 1 year. Objective   Vitals:    05/22/23 1338   Pulse: 89   Resp: 20   Temp: 98.6 °F (37 °C)   TempSrc: Axillary   SpO2: 95%   Weight: (!) 312 lb (141.5 kg)   Height: 4' 7\" (1.397 m)      Body mass index is 72.52 kg/m². General Appearance: in no acute distress, severe obesity, alert and interactive, ambulatory.    Pulmonary/Chest: clear to auscultation bilaterally- no

## 2023-06-05 ENCOUNTER — TELEPHONE (OUTPATIENT)
Age: 38
End: 2023-06-05

## 2023-06-05 DIAGNOSIS — Q87.11 PRADER-WILLI SYNDROME: ICD-10-CM

## 2023-06-05 DIAGNOSIS — R13.10 SWALLOWING PROBLEM: Primary | ICD-10-CM

## 2023-06-05 NOTE — TELEPHONE ENCOUNTER
Pt's mother called and asked if it would be possible for you refer pt to have a swallow function test due to difficulty with swallowing when eating. She stated it would be hard to bring pt back in for an appt due to pt's limited mobility.      LOV 5/22/23    Thank you

## 2023-06-06 NOTE — TELEPHONE ENCOUNTER
Thank you, Dr. Carly Pizano. Pt's mother was contacted and informed that the referral has been place. She said \"thank you. \"

## 2023-07-07 ENCOUNTER — TELEPHONE (OUTPATIENT)
Age: 38
End: 2023-07-07

## 2023-07-07 NOTE — TELEPHONE ENCOUNTER
----- Message from [facility] [employer] sent at 6/27/2023  8:26 AM EDT -----  Subject: Appointment Request    Reason for Call: Established Patient Appointment needed: Routine Existing   Condition Follow Up (Diabetes)    QUESTIONS    Reason for appointment request? Available appointments did not meet   patient need     Additional Information for Provider? Patients mom called in to schedule   patient an appointment for med check and to discuss the choking test.   Appointment in 08/23 but patients mom would like to get her in sooner.    Please call to schedule.  ---------------------------------------------------------------------------  --------------  Gabriel Bird INFO  [phone]; OK to leave message on voicemail  ---------------------------------------------------------------------------  --------------  SCRIPT ANSWERS

## 2023-07-11 ENCOUNTER — TELEPHONE (OUTPATIENT)
Age: 38
End: 2023-07-11

## 2023-07-11 NOTE — TELEPHONE ENCOUNTER
I left a voice mail for the mother of the patient so that she could call back to schedule an earlier appointment with Dr. Reynaldo Guevara.

## 2023-07-28 ENCOUNTER — OFFICE VISIT (OUTPATIENT)
Age: 38
End: 2023-07-28
Payer: MEDICARE

## 2023-07-28 VITALS
WEIGHT: 293 LBS | HEIGHT: 55 IN | TEMPERATURE: 98.4 F | RESPIRATION RATE: 20 BRPM | BODY MASS INDEX: 67.81 KG/M2 | OXYGEN SATURATION: 100 % | HEART RATE: 100 BPM

## 2023-07-28 DIAGNOSIS — R63.2 HYPERPHAGIA: ICD-10-CM

## 2023-07-28 DIAGNOSIS — Q87.11 PRADER-WILLI SYNDROME: ICD-10-CM

## 2023-07-28 DIAGNOSIS — L30.4 INTERTRIGO: ICD-10-CM

## 2023-07-28 DIAGNOSIS — E11.29 TYPE 2 DIABETES MELLITUS WITH OTHER DIABETIC KIDNEY COMPLICATION (HCC): Primary | ICD-10-CM

## 2023-07-28 DIAGNOSIS — E66.01 SEVERE OBESITY (BMI >= 40) (HCC): ICD-10-CM

## 2023-07-28 PROBLEM — I83.029 VARICOSE VEINS OF LEFT LOWER EXTREMITY WITH ULCER OF UNSPECIFIED SITE (CODE) (HCC): Status: RESOLVED | Noted: 2023-07-28 | Resolved: 2023-07-28

## 2023-07-28 PROBLEM — I83.029 VARICOSE VEINS OF LEFT LOWER EXTREMITY WITH ULCER OF UNSPECIFIED SITE (CODE) (HCC): Status: ACTIVE | Noted: 2023-07-28

## 2023-07-28 LAB — HBA1C MFR BLD: 6.6 %

## 2023-07-28 PROCEDURE — 83036 HEMOGLOBIN GLYCOSYLATED A1C: CPT | Performed by: FAMILY MEDICINE

## 2023-07-28 PROCEDURE — 99214 OFFICE O/P EST MOD 30 MIN: CPT | Performed by: FAMILY MEDICINE

## 2023-08-17 DIAGNOSIS — L30.4 INTERTRIGO: ICD-10-CM

## 2023-08-17 NOTE — TELEPHONE ENCOUNTER
Prior Authorization    Prior auth submitted via covermymeds. [email or web address]    Insurance Carrier/Provider: [employer] [facility]    Medication Name/ Dosage: Nystatin-Triamcinolone 083264-9.1UNIT/GM-% cream    Quantity/Day Supply: 240 grams/30Days    DX: [employer] (L30.4)    Reference Number: [id number]    Outcome: Approved; Approved  Coverage Start Date:07/18/2023; Coverage End Date:08/16/2024;     If Denied Reason for Denial: N?A    Prior Authorization Notes/Comments:   [employer] [facility] notified of [place] Approval

## 2023-08-17 NOTE — TELEPHONE ENCOUNTER
Incoming Fax from [address] [place] [address]    Patient is requesting 240grams of Nystatin/Triamcinolone cream per 30days. New prescription is pending.

## 2023-08-23 ENCOUNTER — OFFICE VISIT (OUTPATIENT)
Age: 38
End: 2023-08-23
Payer: MEDICARE

## 2023-08-23 ENCOUNTER — CLINICAL DOCUMENTATION (OUTPATIENT)
Age: 38
End: 2023-08-23

## 2023-08-23 VITALS — HEIGHT: 55 IN | BODY MASS INDEX: 67.81 KG/M2 | WEIGHT: 293 LBS

## 2023-08-23 DIAGNOSIS — E11.29 TYPE 2 DIABETES MELLITUS WITH OTHER DIABETIC KIDNEY COMPLICATION (HCC): ICD-10-CM

## 2023-08-23 DIAGNOSIS — G47.33 OSA (OBSTRUCTIVE SLEEP APNEA): Primary | ICD-10-CM

## 2023-08-23 DIAGNOSIS — Q87.11 PRADER-WILLI SYNDROME: ICD-10-CM

## 2023-08-23 PROCEDURE — 1036F TOBACCO NON-USER: CPT | Performed by: NURSE PRACTITIONER

## 2023-08-23 PROCEDURE — 2022F DILAT RTA XM EVC RTNOPTHY: CPT | Performed by: NURSE PRACTITIONER

## 2023-08-23 PROCEDURE — 3046F HEMOGLOBIN A1C LEVEL >9.0%: CPT | Performed by: NURSE PRACTITIONER

## 2023-08-23 PROCEDURE — 99213 OFFICE O/P EST LOW 20 MIN: CPT | Performed by: NURSE PRACTITIONER

## 2023-08-23 PROCEDURE — G8417 CALC BMI ABV UP PARAM F/U: HCPCS | Performed by: NURSE PRACTITIONER

## 2023-08-23 PROCEDURE — G8427 DOCREV CUR MEDS BY ELIG CLIN: HCPCS | Performed by: NURSE PRACTITIONER

## 2023-08-23 ASSESSMENT — SLEEP AND FATIGUE QUESTIONNAIRES
HOW LIKELY ARE YOU TO NOD OFF OR FALL ASLEEP WHILE SITTING QUIETLY AFTER LUNCH WITHOUT ALCOHOL: 0
ESS TOTAL SCORE: 0
HOW LIKELY ARE YOU TO NOD OFF OR FALL ASLEEP WHILE SITTING INACTIVE IN A PUBLIC PLACE: 0
HOW LIKELY ARE YOU TO NOD OFF OR FALL ASLEEP WHILE SITTING AND READING: 0
HOW LIKELY ARE YOU TO NOD OFF OR FALL ASLEEP WHILE SITTING AND TALKING TO SOMEONE: 0
HOW LIKELY ARE YOU TO NOD OFF OR FALL ASLEEP WHILE WATCHING TV: 0
HOW LIKELY ARE YOU TO NOD OFF OR FALL ASLEEP WHEN YOU ARE A PASSENGER IN A CAR FOR AN HOUR WITHOUT A BREAK: 0
HOW LIKELY ARE YOU TO NOD OFF OR FALL ASLEEP IN A CAR, WHILE STOPPED FOR A FEW MINUTES IN TRAFFIC: 0
HOW LIKELY ARE YOU TO NOD OFF OR FALL ASLEEP WHILE LYING DOWN TO REST IN THE AFTERNOON WHEN CIRCUMSTANCES PERMIT: 0

## 2023-08-23 NOTE — PROGRESS NOTES
[address] [facility] [place], [address]   Tel.  [phone]   Fax. [facility], [address] [facility]   Tel.  [phone]   Fax. [phone]  [facility], [address] [facility]   Tel.  [phone]   Fax. [phone]     Carlota Sharma (: 1985) is a 39 y.o. female, established patient, seen for positive airway pressure follow-up and evaluation. She was last seen by me 2022, previously seen by Dr. Alison Moy on 2019, prior notes reviewed in detail. She was diagnosed with sleep apnea many years ago and has remained on her device ever since. She is seen today for follow up. ASSESSMENT/PLAN:   Diagnosis Orders   1. DIMA (obstructive sleep apnea)  DME Order for [facility]) as OP      2. Prader-Willi syndrome        3. Type 2 diabetes mellitus with other diabetic kidney complication ([address])        4. BMI 70 and over, adult ([address])          AHI = ?. On CPAP :  8 cmH2O. Set up . She is adherent with PAP therapy and PAP continues to benefit patient and remains necessary for control of her sleep apnea. No follow-up provider specified. Sleep Apnea - continue on current pressures. Orders Placed This Encounter   Procedures    DME Order for (Specify) as OP     Diagnosis: (G47.33) DIMA (obstructive sleep apnea)  (primary encounter diagnosis)     Replacement Supplies for Positive Airway Pressure Therapy Device:   Duration of need: 99 months.  Full Face Mask 1 every 3 months.  Full Face Mask Cushion 1 per month.  Headgear 1 every 6 months.  Positive Airway Pressure chinstrap 1 every 6 months.  Tubing with heating element 1 every 3 months.  Filter(s) Disposable 2 per month.  Filter(s) Non-Disposable 1 every 6 months. .   [address] for Humidifier (Replace) 1 every 6 months. Harriet Abad [facility] NPI: [id number]    Electronically signed.  Date:- 23     * Counseling

## 2023-08-23 NOTE — PATIENT INSTRUCTIONS
you cannot see the time. If you worry when you lie down, start a worry book. Well before bedtime, write down your worries, and then set the book and your concerns aside. Try meditation or other relaxation techniques before you go to bed. If you cannot fall asleep, get up and go to another room until you feel sleepy. Do something relaxing. Repeat your bedtime routine before you go to bed again. Make your house quiet and calm about an hour before bedtime. Turn down the lights, turn off the TV, log off the computer, and turn down the volume on music. This can help you relax after a busy day. Drowsy Driving: The [address] [place] [address] cites drowsiness as a causing factor in more than 359,524 police reported crashes annually, resulting in 76,000 injuries and 1,500 deaths. Other surveys suggest 55% of people polled have driven while drowsy in the past year, 23% had fallen asleep but not crashed, 3% crashed, and 2% had and accident due to drowsy driving. Who is at risk? Young Drivers: One study of drowsy driving accidents states that 55% of the drivers were under 25 years. Of those, 75% were male. Shift Workers and Travelers: People who work overnight or travel across time zones frequently are at higher risk of experiencing Circadian Rhythm Disorders. They are trying to work and function when their body is programed to sleep. Sleep Deprived: Lack of sleep has a serious impact on your ability to pay attention or focus on a task. Consistently getting less than the average of 8 hours your body needs creates partial or cumulative sleep deprivation. Untreated Sleep Disorders: Sleep Apnea, Narcolepsy, R.L.S., and other sleep disorders (untreated) prevent a person from getting enough restful sleep. This leads to excessive daytime sleepiness and increases the risk for drowsy driving accidents by up to 7 times.   Medications / Alcohol: Even over the counter medications can cause

## 2023-09-22 ENCOUNTER — OFFICE VISIT (OUTPATIENT)
Age: 38
End: 2023-09-22
Payer: MEDICARE

## 2023-09-22 VITALS
TEMPERATURE: 98 F | HEART RATE: 94 BPM | BODY MASS INDEX: 67.81 KG/M2 | HEIGHT: 55 IN | RESPIRATION RATE: 18 BRPM | WEIGHT: 293 LBS | OXYGEN SATURATION: 95 %

## 2023-09-22 DIAGNOSIS — E11.29 TYPE 2 DIABETES MELLITUS WITH OTHER DIABETIC KIDNEY COMPLICATION (HCC): Primary | ICD-10-CM

## 2023-09-22 DIAGNOSIS — Z23 ENCOUNTER FOR IMMUNIZATION: ICD-10-CM

## 2023-09-22 DIAGNOSIS — L85.3 DRY SKIN: ICD-10-CM

## 2023-09-22 PROCEDURE — 90739 HEPB VACC 2/4 DOSE ADULT IM: CPT | Performed by: FAMILY MEDICINE

## 2023-09-22 PROCEDURE — 99214 OFFICE O/P EST MOD 30 MIN: CPT | Performed by: FAMILY MEDICINE

## 2023-09-22 RX ORDER — ARIPIPRAZOLE 15 MG/1
TABLET ORAL
COMMUNITY
Start: 2023-09-14

## 2023-09-22 RX ORDER — AMMONIUM LACTATE 12 G/100G
CREAM TOPICAL PRN
Qty: 385 G | Refills: 3 | Status: SHIPPED | OUTPATIENT
Start: 2023-09-22

## 2023-09-22 SDOH — ECONOMIC STABILITY: HOUSING INSECURITY
IN THE LAST 12 MONTHS, WAS THERE A TIME WHEN YOU DID NOT HAVE A STEADY PLACE TO SLEEP OR SLEPT IN A SHELTER (INCLUDING NOW)?: NO

## 2023-09-22 SDOH — ECONOMIC STABILITY: FOOD INSECURITY: WITHIN THE PAST 12 MONTHS, YOU WORRIED THAT YOUR FOOD WOULD RUN OUT BEFORE YOU GOT MONEY TO BUY MORE.: NEVER TRUE

## 2023-09-22 SDOH — ECONOMIC STABILITY: INCOME INSECURITY: HOW HARD IS IT FOR YOU TO PAY FOR THE VERY BASICS LIKE FOOD, HOUSING, MEDICAL CARE, AND HEATING?: NOT HARD AT ALL

## 2023-09-22 SDOH — ECONOMIC STABILITY: FOOD INSECURITY: WITHIN THE PAST 12 MONTHS, THE FOOD YOU BOUGHT JUST DIDN'T LAST AND YOU DIDN'T HAVE MONEY TO GET MORE.: NEVER TRUE

## 2023-09-22 ASSESSMENT — ENCOUNTER SYMPTOMS
NAUSEA: 0
CONSTIPATION: 0
ABDOMINAL PAIN: 0

## 2023-09-22 NOTE — PROGRESS NOTES
History of Present Illness:     Chief Complaint   Patient presents with    1 Month Follow-Up     Started Victoza at Last Office Visit 07/28/2023       Dora Finch is a 45 y.o. female   History provided by mother, her caregiver, due to underlying     Would like to discuss wt management  Prader-Willi syndrome causes her to eat all the time and difficulty losing wt    Started on Victoza for diabetes  Hoping to aid in wt loss in addition to BG management    Currently taking Victoza 0.6mg daily  Mother does seem like her preoccupation with food is better    Blood sugars continue to be unpredictable   Currently taking Lantus 80 units nightly, Novolog 40 units before meals      PMH (REVIEWED):  Past Medical History:   Diagnosis Date    Anxiety disorder     Severe    Cellulitis, abdominal wall 5/18/2018    Congestive heart failure, unspecified     Diabetes ([address])     Hypertension     Ill-defined condition     lymphedema    Lymph edema     Open wound of abdominal wall with complication 2/24/1597    Sleep apnea     cpap    Varicose veins of left lower extremity with ulcer of unspecified site (CODE) ([address]) 7/28/2023       Current Medications/Allergies (REVIEWED):     Current Outpatient Medications on File Prior to Visit   Medication Sig Dispense Refill    nystatin-triamcinolone (MYCOLOG II) 597480-7.1 UNIT/GM-% cream Apply topically to affected areas four times per day 240 g 1    aspirin 81 MG EC tablet aspirin 81 mg tablet,delayed release   Take 1 tablet every day by oral route. albuterol sulfate HFA (PROVENTIL;VENTOLIN;PROAIR) 108 (90 Base) MCG/ACT inhaler Inhale 1 puff into the lungs every 4 hours as needed      ALPRAZolam (XANAX) 0.25 MG tablet Take 1 tablet by mouth 3 times daily as needed.       ARIPiprazole (ABILIFY) 10 MG tablet Take 1.5 tablets by mouth daily ceived the following from [facility]: Outside name: ARIPiprazole (ABILIFY) 10 mg tablet      clindamycin (CLINDAGEL) 1 % gel APPLY TO

## 2023-10-13 ENCOUNTER — TELEPHONE (OUTPATIENT)
Age: 38
End: 2023-10-13

## 2023-10-13 NOTE — TELEPHONE ENCOUNTER
Pt's mother stated that pt is at the 1.8 mg of Victoza. She contacted [facility] for a refill; however, she was informed a new Rx is needed.     Thank you

## 2023-10-13 NOTE — TELEPHONE ENCOUNTER
Pharmacy faxed and states: patient says she is now using 1.6mg of Victoza daily. Please send over a new rx with the updated directions and quantity so insurance will cover rx. I will also place this in the black box. Thanks!

## 2023-10-19 ENCOUNTER — PATIENT MESSAGE (OUTPATIENT)
Age: 38
End: 2023-10-19

## 2023-10-23 DIAGNOSIS — L30.4 INTERTRIGO: ICD-10-CM

## 2023-10-23 NOTE — TELEPHONE ENCOUNTER
Medication Refill Request    Yadiel Meyer is requesting a refill of the following medication(s):   Requested Prescriptions     Pending Prescriptions Disp Refills    nystatin-triamcinolone (MYCOLOG II) 491093-4.2 UNIT/GM-% cream [Pharmacy Med Name: NYSTATIN/TRIAMCINOLONE CREAM 60GM] 240 g 1     Sig: APPLY TOPICALLY TO THE AFFECTED AREA FOUR TIMES DAILY      Last provider to prescribe medication: Dr. Bayron Cunningham  Last Date of Medication Prescribed: 08/17/2023   Last Office Visit Date: 09/22/2023    Please send refill to:    [facility] #[id number] - [address] [phone]  [address] [place] [address]  Phone: [phone] Fax: [phone]

## 2023-11-06 NOTE — TELEPHONE ENCOUNTER
Medication Refill Request    Dina Velásquez is requesting a refill of the following medication(s):   Requested Prescriptions     Pending Prescriptions Disp Refills    mupirocin (BACTROBAN) 2 % ointment [Pharmacy Med Name: MUPIROCIN 2% OINTMENT 22GM] 22 g      [facility] FOR 5 DAYS      Last provider to prescribe medication: Dr. Gi Mejia  Last Date of Medication Prescribed: 2023  Last Office Visit Date: 2023    Please send refill to:    [facility] #[id number] - [address] [phone]  [address] [place] [address]  Phone: [phone] Fax: [phone]

## 2023-11-10 ENCOUNTER — PATIENT MESSAGE (OUTPATIENT)
Age: 38
End: 2023-11-10

## 2023-11-10 RX ORDER — ERGOCALCIFEROL 1.25 MG/1
50000 CAPSULE ORAL WEEKLY
Qty: 12 CAPSULE | Refills: 1 | Status: SHIPPED | OUTPATIENT
Start: 2023-11-10

## 2023-11-10 NOTE — TELEPHONE ENCOUNTER
Medication Refill Request    Arden Negrete is requesting a refill of the following medication(s):   Requested Prescriptions     Pending Prescriptions Disp Refills    vitamin D (ERGOCALCIFEROL) 1.25 MG (11244 UT) CAPS capsule [Pharmacy Med Name: VITAMIN D2 50,000IU (ERGO) CAP RX] 12 capsule      Sig: TAKE 1 CAPSULE BY MOUTH EVERY WEEK      Medication is not on current medication list.  Last Office Visit Date: 09/22/2023    Please send refill to:    [facility] #[id number] - [address] [phone]  [address] [facility] [address] [address]  Phone: [phone] Fax: [phone]

## 2023-11-21 RX ORDER — ALBUTEROL SULFATE 2.5 MG/3ML
SOLUTION RESPIRATORY (INHALATION)
Qty: 75 ML | Refills: 3 | Status: SHIPPED | OUTPATIENT
Start: 2023-11-21 | End: 2023-12-04 | Stop reason: SDUPTHER

## 2023-11-21 RX ORDER — ALBUTEROL SULFATE 90 UG/1
1 AEROSOL, METERED RESPIRATORY (INHALATION) EVERY 4 HOURS PRN
Qty: 18 G | Refills: 3 | Status: SHIPPED | OUTPATIENT
Start: 2023-11-21

## 2023-11-21 RX ORDER — ALBUTEROL SULFATE 2.5 MG/3ML
SOLUTION RESPIRATORY (INHALATION)
Qty: 75 ML | Refills: 3 | Status: SHIPPED | OUTPATIENT
Start: 2023-11-21

## 2023-11-21 NOTE — TELEPHONE ENCOUNTER
Requested Prescriptions     Pending Prescriptions Disp Refills    albuterol (PROVENTIL) (2.5 MG/3ML) 0.083% nebulizer solution [Pharmacy Med Name: ALBUTEROL 0.083%(2.5MG/3ML) 25X3ML] 75 mL      Sig: USE 1 VIAL VIA NEBULIZER EVERY 6 HOURS AS NEEDED FOR COUGH AND WHEEZING    albuterol (PROVENTIL) (2.5 MG/3ML) 0.083% nebulizer solution [Pharmacy Med Name: ALBUTEROL 0.083%(2.5MG/3ML) 25X3ML] 75 mL      Sig: USE 1 VIAL VIA NEBULIZER EVERY 6 HOURS AS NEEDED FOR COUGH AND WHEEZING     Medication Refill Request    Huyen Hornerbeth is requesting a refill of the following medication(s):   Requested Prescriptions     Pending Prescriptions Disp Refills    albuterol (PROVENTIL) (2.5 MG/3ML) 0.083% nebulizer solution [Pharmacy Med Name: ALBUTEROL 0.083%(2.5MG/3ML) 25X3ML] 75 mL      Sig: USE 1 VIAL VIA NEBULIZER EVERY 6 HOURS AS NEEDED FOR COUGH AND WHEEZING    albuterol (PROVENTIL) (2.5 MG/3ML) 0.083% nebulizer solution [Pharmacy Med Name: ALBUTEROL 0.083%(2.5MG/3ML) 25X3ML] 75 mL      Sig: USE 1 VIAL VIA NEBULIZER EVERY 6 HOURS AS NEEDED FOR COUGH AND WHEEZING      Last provider to prescribe medication: Dr. Desouza  Last Date of Medication Prescribed: 06/10/2022   Last Office Visit Date: 09/22/2023    Please send refill to:    [facility] #[id number] [place], [place] - [address] - P [phone] - F [phone]  [address] [place], [place] [address]  [facility] [address]  Phone: [phone] Fax: [phone]

## 2023-12-01 RX ORDER — ALBUTEROL SULFATE 2.5 MG/3ML
SOLUTION RESPIRATORY (INHALATION)
Qty: 75 ML | OUTPATIENT
Start: 2023-12-01

## 2023-12-04 RX ORDER — ALBUTEROL SULFATE 2.5 MG/3ML
SOLUTION RESPIRATORY (INHALATION)
Qty: 75 ML | Refills: 3 | Status: SHIPPED | OUTPATIENT
Start: 2023-12-04

## 2023-12-27 DIAGNOSIS — L30.4 INTERTRIGO: ICD-10-CM

## 2024-01-25 ENCOUNTER — PATIENT MESSAGE (OUTPATIENT)
Age: 39
End: 2024-01-25

## 2024-01-25 NOTE — TELEPHONE ENCOUNTER
From: Huyen Ahumada  To: Dr. Dary Desouza  Sent: 1/25/2024 9:43 AM EST  Subject: Rehab Therapist at Home    Good Morning Dr. Desouza,  I contacted [facility] to see if Huyen could qualify for a feather weight wheel chair or transport chair. She recommended that a physical therapist come to our house and do an evaluation for a wheelchair that will meet her specials needs. Also we need an evaluation for her bath tub. She is having problems getting in and out.    If you can give us a referral, I will be glad to follow up if you think that will work. Thank you so very much for your help.  Katja

## 2024-02-15 DIAGNOSIS — L30.4 INTERTRIGO: ICD-10-CM

## 2024-02-23 ENCOUNTER — OFFICE VISIT (OUTPATIENT)
Age: 39
End: 2024-02-23
Payer: MEDICARE

## 2024-02-23 VITALS
RESPIRATION RATE: 18 BRPM | WEIGHT: 293 LBS | BODY MASS INDEX: 67.81 KG/M2 | OXYGEN SATURATION: 96 % | HEART RATE: 92 BPM | TEMPERATURE: 98 F | HEIGHT: 55 IN

## 2024-02-23 DIAGNOSIS — Q87.11 PRADER-WILLI SYNDROME: Primary | ICD-10-CM

## 2024-02-23 DIAGNOSIS — E11.29 TYPE 2 DIABETES MELLITUS WITH OTHER DIABETIC KIDNEY COMPLICATION (HCC): ICD-10-CM

## 2024-02-23 DIAGNOSIS — R63.2 HYPERPHAGIA: ICD-10-CM

## 2024-02-23 DIAGNOSIS — R26.81 UNSTEADY GAIT: ICD-10-CM

## 2024-02-23 DIAGNOSIS — T88.7XXA SIDE EFFECT OF MEDICATION: ICD-10-CM

## 2024-02-23 DIAGNOSIS — I89.0 LYMPHEDEMA: ICD-10-CM

## 2024-02-23 DIAGNOSIS — I50.9 CONGESTIVE HEART FAILURE, UNSPECIFIED HF CHRONICITY, UNSPECIFIED HEART FAILURE TYPE (HCC): ICD-10-CM

## 2024-02-23 PROCEDURE — G8417 CALC BMI ABV UP PARAM F/U: HCPCS | Performed by: FAMILY MEDICINE

## 2024-02-23 PROCEDURE — G8427 DOCREV CUR MEDS BY ELIG CLIN: HCPCS | Performed by: FAMILY MEDICINE

## 2024-02-23 PROCEDURE — 1036F TOBACCO NON-USER: CPT | Performed by: FAMILY MEDICINE

## 2024-02-23 PROCEDURE — G8484 FLU IMMUNIZE NO ADMIN: HCPCS | Performed by: FAMILY MEDICINE

## 2024-02-23 PROCEDURE — 2022F DILAT RTA XM EVC RTNOPTHY: CPT | Performed by: FAMILY MEDICINE

## 2024-02-23 PROCEDURE — 99214 OFFICE O/P EST MOD 30 MIN: CPT | Performed by: FAMILY MEDICINE

## 2024-02-23 PROCEDURE — 3046F HEMOGLOBIN A1C LEVEL >9.0%: CPT | Performed by: FAMILY MEDICINE

## 2024-02-23 RX ORDER — ORAL SEMAGLUTIDE 3 MG/1
3 TABLET ORAL DAILY
Qty: 30 TABLET | Refills: 0 | Status: SHIPPED | OUTPATIENT
Start: 2024-02-23

## 2024-02-23 RX ORDER — ORAL SEMAGLUTIDE 7 MG/1
1 TABLET ORAL DAILY
Qty: 90 TABLET | Refills: 0 | Status: SHIPPED | OUTPATIENT
Start: 2024-03-23

## 2024-02-23 ASSESSMENT — PATIENT HEALTH QUESTIONNAIRE - PHQ9
SUM OF ALL RESPONSES TO PHQ QUESTIONS 1-9: 0
SUM OF ALL RESPONSES TO PHQ QUESTIONS 1-9: 0
2. FEELING DOWN, DEPRESSED OR HOPELESS: 0
SUM OF ALL RESPONSES TO PHQ9 QUESTIONS 1 & 2: 0
SUM OF ALL RESPONSES TO PHQ QUESTIONS 1-9: 0
SUM OF ALL RESPONSES TO PHQ QUESTIONS 1-9: 0
1. LITTLE INTEREST OR PLEASURE IN DOING THINGS: 0

## 2024-02-23 ASSESSMENT — ENCOUNTER SYMPTOMS
ABDOMINAL PAIN: 0
NAUSEA: 0
CONSTIPATION: 0

## 2024-02-23 NOTE — PROGRESS NOTES
Patient has been identified by name and .    Chief Complaint   Patient presents with    Check-Up     Pt reports for a F/U on wheel chair. No other concerns today.       Vitals:    24 1039   Pulse: 92   Resp: 18   Temp: 98 °F (36.7 °C)   TempSrc: Axillary   SpO2: 96%   Weight: (!) 145.6 kg (321 lb)   Height: 1.397 m (4' 7\")        \"Have you been to the ER, urgent care clinic since your last visit?  Hospitalized since your last visit?\"    NO    “Have you seen or consulted any other health care providers outside of [facility] since your last visit?”    NO

## 2024-02-23 NOTE — PROGRESS NOTES
History of Present Illness:     Chief Complaint   Patient presents with    Check-Up     Pt reports for a F/U on wheel chair. No other concerns today.       Huyen Ahumada is a 39 y.o. female   History provided by mother, her caregiver, due to underlying     Would like to discuss wt management  Prader-Willi syndrome causes her to eat all the time and difficulty losing wt  Wt continues to be an issue  Victoza bothered her stomach and did not lose weight    Back to using Lantus and Humalog    Difficulty getting her in and out of the tub  Inquiring about a lift for the bath  Power wheelchair for mobility       PMH (REVIEWED):  Past Medical History:   Diagnosis Date    Anxiety disorder     Severe    Cellulitis, abdominal wall 5/18/2018    Congestive heart failure, unspecified     Diabetes (HCC)     Hypertension     Ill-defined condition     lymphedema    Lymph edema     Open wound of abdominal wall with complication 5/23/2018    Sleep apnea     cpap    Varicose veins of left lower extremity with ulcer of unspecified site (CODE) (HCC) 7/28/2023       Current Medications/Allergies (REVIEWED):     Current Outpatient Medications on File Prior to Visit   Medication Sig Dispense Refill    nystatin-triamcinolone (MYCOLOG II) 730534-6.1 UNIT/GM-% cream APPLY TOPICALLY TO THE AFFECTED AREA FOUR TIMES DAILY 240 g 3    albuterol (PROVENTIL) (2.5 MG/3ML) 0.083% nebulizer solution USE 1 VIAL VIA NEBULIZER EVERY 6 HOURS AS NEEDED FOR COUGH AND WHEEZING 75 mL 3    vitamin D (ERGOCALCIFEROL) 1.25 MG (23221 UT) CAPS capsule TAKE 1 CAPSULE BY MOUTH EVERY WEEK 12 capsule 1    mupirocin (BACTROBAN) 2 % ointment APPLY TOPICALLY TO THE AFFECTED AREA THREE TIMES DAILY FOR 5 DAYS 22 g 1    Insulin Pen Needle 31G X 5 MM MISC Use a directed to inject insulin/victoza as directed. DX Code E11.9 Type 2 diabetes mellitus with other diabetic kidney complication (HCC) 400 each 3    ammonium lactate (AMLACTIN) 12 % cream Apply topically as needed for

## 2024-03-06 ENCOUNTER — TELEPHONE (OUTPATIENT)
Age: 39
End: 2024-03-06

## 2024-03-06 ENCOUNTER — CLINICAL DOCUMENTATION (OUTPATIENT)
Age: 39
End: 2024-03-06

## 2024-03-06 NOTE — PROGRESS NOTES
Prior Authorization    Prior auth submitted via [email or web address]    Insurance Carrier/Provider: [employer]    Medication Name/ Dosage: Rybelsus 7MG tablets    Quantity/Day Supply: 30 tablets/30 days    DX: Type 2 diabetes mellitus with other diabetic kidney complication (HCC)     Reference Number: CaseId:[id number]     Outcome: Approved; Start Date:02/05/2024;Coverage End Date:03/06/2025;     If Denied Reason for Denial: N/A    .

## 2024-03-06 NOTE — TELEPHONE ENCOUNTER
SW attempted to reach patient regarding DME request. Left voicemail requesting a return call.    MANSOOR Mims Navigator

## 2024-03-11 ENCOUNTER — TELEPHONE (OUTPATIENT)
Age: 39
End: 2024-03-11

## 2024-03-11 NOTE — TELEPHONE ENCOUNTER
SW contacted patient's mother regarding DME request.     SW and physician discussed the need for DME and recommended an in home PT assessment to determine specific DME needs. Physician to submit referral request for in-home PT.    Patient's mother advised.    MANSOOR Mims Navigator

## 2024-03-12 ENCOUNTER — PATIENT MESSAGE (OUTPATIENT)
Age: 39
End: 2024-03-12

## 2024-03-12 DIAGNOSIS — L85.3 DRY SKIN: ICD-10-CM

## 2024-03-12 DIAGNOSIS — L30.4 INTERTRIGO: ICD-10-CM

## 2024-03-12 NOTE — TELEPHONE ENCOUNTER
Medication Refill Request    Huyen Ahumada is requesting a refill of the following medication(s):   Requested Prescriptions     Pending Prescriptions Disp Refills    nystatin-triamcinolone (MYCOLOG II) 070917-2.1 UNIT/GM-% cream 240 g 3     Sig: Apply topically 4 times daily.        Listed PCP is Dary Desouza MD   Last provider to prescribe medication: Dr. Desouza  Last Date of Medication Prescribed: 2/16/24   Date of Last Office Visit at [facility]: 2/23/24  FUTURE APPOINTMENT:   Future Appointments   Date Time Provider Department Center   8/29/2024 11:10 AM Daisy Herrera, APRN - NP [facility] BS AMB       Please send refill to:    [facility][id number] [place], [place] - [address] - P [phone] - F [phone] [address]  [facility] [address]  Phone: [phone] Fax: [phone]      Please review request and approve or deny with recommendations.

## 2024-03-13 ENCOUNTER — HOME HEALTH ADMISSION (OUTPATIENT)
Dept: HOME HEALTH SERVICES | Facility: HOME HEALTH | Age: 39
End: 2024-03-13

## 2024-03-14 ENCOUNTER — HOME CARE VISIT (OUTPATIENT)
Facility: HOME HEALTH | Age: 39
End: 2024-03-14

## 2024-03-14 PROCEDURE — 0221000100 HH NO PAY CLAIM PROCEDURE

## 2024-03-14 PROCEDURE — G0151 HHCP-SERV OF PT,EA 15 MIN: HCPCS

## 2024-03-14 NOTE — HOME HEALTH
notified of the following:  PT evaluation only; requested OT and MSW consults    Discharge planning discussed with patient and caregiver. Discharge planning as follows: pt to remain at home with assist of caregiver and guidance from MD:  Patient/caregiver did verbalize agreement with discharge planning.     Specific plan for next visit: n/a  evaluation only

## 2024-03-15 VITALS — OXYGEN SATURATION: 96 % | HEART RATE: 88 BPM | TEMPERATURE: 97.5 F | RESPIRATION RATE: 14 BRPM

## 2024-03-15 RX ORDER — AMMONIUM LACTATE 12 G/100G
CREAM TOPICAL
Qty: 560 G | Refills: 5 | Status: SHIPPED | OUTPATIENT
Start: 2024-03-15

## 2024-03-15 ASSESSMENT — ENCOUNTER SYMPTOMS: DYSPNEA ACTIVITY LEVEL: AFTER AMBULATING MORE THAN 20 FT

## 2024-03-15 NOTE — TELEPHONE ENCOUNTER
Medication Refill Request    Huyen Ahumada is requesting a refill of the following medication(s):   Requested Prescriptions     Pending Prescriptions Disp Refills    ammonium lactate (AMLACTIN) 12 % cream [Pharmacy Med Name: AMMONIUM LAC 12% CREAM 280GM] 560 g      Sig: APPLY TOPICALLY AS NEEDED FOR DRY SKIN        Listed PCP is Dary Desouza MD   Last provider to prescribe medication: Dr. Desouza  Last Date of Medication Prescribed: 09/22/2023   Date of Last [facility] Visit at [facility]: 02/23/2024  Last Labs:  Future Appointment:   Future Appointments   Date Time Provider Department Center   8/29/2024 11:10 AM Daisy Herrera, APRN - NP [facility] BS AMB       Please send refill to:    [facility] #[id number] [place], [place] - [address] - P [phone] - F [phone] [address] [facility] [address]  Phone: [phone] Fax: [phone]    [facility] [address] [place] [address] [facility] [phone] - F [phone] [address] [facility] [address]  Phone: [phone] Fax: [phone]

## 2024-03-18 ENCOUNTER — PATIENT MESSAGE (OUTPATIENT)
Age: 39
End: 2024-03-18

## 2024-03-18 ENCOUNTER — HOME CARE VISIT (OUTPATIENT)
Facility: HOME HEALTH | Age: 39
End: 2024-03-18

## 2024-03-18 ENCOUNTER — HOME CARE VISIT (OUTPATIENT)
Dept: HOME HEALTH SERVICES | Facility: HOME HEALTH | Age: 39
End: 2024-03-18

## 2024-03-18 VITALS — RESPIRATION RATE: 18 BRPM | OXYGEN SATURATION: 97 % | HEART RATE: 88 BPM | TEMPERATURE: 97.9 F

## 2024-03-18 DIAGNOSIS — Q87.11 PRADER-WILLI SYNDROME: Primary | ICD-10-CM

## 2024-03-18 DIAGNOSIS — I50.9 CONGESTIVE HEART FAILURE, UNSPECIFIED HF CHRONICITY, UNSPECIFIED HEART FAILURE TYPE (HCC): ICD-10-CM

## 2024-03-18 DIAGNOSIS — R26.81 UNSTEADY GAIT: ICD-10-CM

## 2024-03-18 PROCEDURE — G0152 HHCP-SERV OF OT,EA 15 MIN: HCPCS

## 2024-03-19 ENCOUNTER — HOME CARE VISIT (OUTPATIENT)
Facility: HOME HEALTH | Age: 39
End: 2024-03-19

## 2024-03-19 PROCEDURE — G0155 HHCP-SVS OF CSW,EA 15 MIN: HCPCS

## 2024-03-19 NOTE — TELEPHONE ENCOUNTER
From: Huyen Ahumada  To: Dr. Dary Desouza  Sent: 3/18/2024 1:16 PM EDT  Subject: Huyen’s Ultra lite wheelchair    Hi Carline Michel’s  suggested we contact [facility] for an evaluation for an ultra light wheelchair with power or without which one her Medicaid will pay for. Could you please send [employer] a prescription for an ultra light wheelchair. The prescription can be faxed to [facility] [phone]. They will contact the insurance company for approval. (Hopefully). Thank you so very much.

## 2024-03-19 NOTE — HOME HEALTH
VALENTINA MCKINLEY conducted the initial SW assessment in the home with pt and mother.  Pt is a 39 year old female with a dx of Prader-Willi Syndrome. On arrival pt was sitting in chair in dining room area listening to music.  Pt lives with her mother in a 1 story home that was clean and well kept.  Pt acknowledge MSW and engaged minimally throughout visit answering only closed ended questions.  MSW met with pt mother who is a strong advocate for pt and was inquiring about resources to assist sporadically during times of pt bathing when pt father is out of town. This would be for 1 hour 3x/week as needed.  Mother and father are ; however, they are supportive of each other and have been co-parenting well for years. Pt is a recipient of Medicaid and Medicare and is utilizing PCA hours through the consumer-directed plan; which pt mother wishes to continue with as she is the primary CG for pt.  Mother is not interested in exploring group home options, prefers to maintain pt in the home, and is not interested in changing Medicaid plan to accommodate agency coverage at this time; however, is aware that this is an option for later when mother is no longer able to be PCG for pt. Pt has 3 other siblings- 1 who lives in [place] (manages Consumer Direct hours), 1 in [place] (will assist with care needs when visiting), and 1 in [place].  Due to the limited times and inconsistency of needs MSW educated mother on posting the specific needs on care.com, [employer], and MSW provided an agency ([employer] [facility]) as an option as they do not have the 20 hr weekly minimum for PCA.  Mother verbalized understanding of information provided with 100% accuracy and MSW provided written contact info for [employer].

## 2024-03-19 NOTE — HOME HEALTH
Reason for referral, PMH SUMMARY of clinical condition: Pt is a 38 yo female referred by PCP for OT evaluation to assist with acquiring medical equipment; PMH significant for: CHF, HLD, anxiety, lymphedema, DM2, DVT. Pt lives with her elderly mother in a one story home with 4 DASHA. Pt has manual w/c and bath lift.    Clinical Assessment/Skilled reason for admission to home health (What this means for the patient overall and need for ongoing skilled care): Pt appears to be at baseline level of functioning with ADLs and functional mobility, however her mother reports more difficulty recently with tub transfers and getting pt out for medical appts. Pt has a jaccloud.IQi style tub with an Medisas bath lift seat that lowers pt into bottom of tub for bathing, however pt still has to climb onto step stool to step over edge of tub, which is very difficult for pt and cg to manage. Pt's mother is requesting a larger bath lift as pt has recently gained weight and it is difficult for cg to assist her on existing lift seat. OT contacted [employer], the supplier for the bath lift, and was informed that the weight limit for pt's current bath lift is 375lbs. As pt is under the weight limit at approximately 335lbs and pt has only had her current bath lift for 3 years, insurance will not cover a new bath lift until 2026. There is a walk in shower with glass doors in the secondary bathroom in pt's home. OT suggested purchasing a bariatric shower chair to use in the shower as it would be easier and safer for pt to manage the transfer. OT suggested removing the glass door and replacing with a tension curtain kai/shower curtain to allow cg more space for assisting with shower routine, however pt's mother is not receptive to making these changes as she wants pt to be able to continue to use bathtub, as she states it is therapeutic for her. Per pt's mother, it is very difficult to get pt out to medical appts in manual w/c d/t pt's weight and

## 2024-03-20 ENCOUNTER — HOME CARE VISIT (OUTPATIENT)
Dept: HOME HEALTH SERVICES | Facility: HOME HEALTH | Age: 39
End: 2024-03-20

## 2024-03-26 ASSESSMENT — ENCOUNTER SYMPTOMS: DYSPNEA ACTIVITY LEVEL: AFTER AMBULATING 10 - 20 FT

## 2024-04-02 ENCOUNTER — TELEPHONE (OUTPATIENT)
Age: 39
End: 2024-04-02

## 2024-04-08 RX ORDER — PANTOPRAZOLE SODIUM 40 MG/1
TABLET, DELAYED RELEASE ORAL
Qty: 90 TABLET | Refills: 3 | Status: SHIPPED | OUTPATIENT
Start: 2024-04-08

## 2024-04-08 NOTE — TELEPHONE ENCOUNTER
Medication Refill Request    Huyen Ahumada is requesting a refill of the following medication(s):   Requested Prescriptions     Pending Prescriptions Disp Refills    pantoprazole (PROTONIX) 40 MG tablet [Pharmacy Med Name: PANTOPRAZOLE 40MG TABLETS] 90 tablet      Sig: TAKE 1 TABLET BY MOUTH DAILY        Listed PCP is Dray Desouza MD   Last provider to prescribe medication: Dr. Desouza  Last Date of Medication Prescribed: 06/21/2022   Date of Last Office Visit at [facility]: 02/23/2024     Future Appointment:   Future Appointments   Date Time Provider Department Center   8/29/2024 11:10 AM Daisy Herrera, APRN - NP [facility] BS AMB       Please send refill to:    [facility][id number] [place], [place] - [address]  P [phone] - f [phone] [address] [facility] [facility] [address]  Phone: [phone] Fax: [phone]    [id number] [place] [address] [phone] - F [phone] [address] [facility] [address]  Phone: [phone] Fax: [phone]

## 2024-04-18 RX ORDER — PEN NEEDLE, DIABETIC 31 GX5/16"
1 NEEDLE, DISPOSABLE MISCELLANEOUS DAILY
Qty: 100 EACH | Refills: 5 | Status: SHIPPED | OUTPATIENT
Start: 2024-04-18

## 2024-05-06 ENCOUNTER — PATIENT MESSAGE (OUTPATIENT)
Age: 39
End: 2024-05-06

## 2024-05-06 DIAGNOSIS — K64.9 BLEEDING HEMORRHOIDS: Primary | ICD-10-CM

## 2024-05-06 NOTE — TELEPHONE ENCOUNTER
From: Huyen Ahumada  To: Dr. Dary Desouza  Sent: 5/6/2024 12:43 PM EDT  Subject: Additional information for prescription refill request    Hi Dr. Desouza and Jayla Osorio doesn’t have the lidocaine 3% hydrocortisone 0.5% rectal cream kit.    [facility] has it in stock. Their phone number is [phone] Maura. I should have checked it out before sending you our request. Thanks.

## 2024-05-14 LAB — HBA1C MFR BLD HPLC: 6.3 %

## 2024-05-15 ENCOUNTER — TELEPHONE (OUTPATIENT)
Age: 39
End: 2024-05-15

## 2024-05-15 NOTE — TELEPHONE ENCOUNTER
----- Message from Molly Xie sent at 5/14/2024  1:53 PM EDT -----  Subject: Refill Request    QUESTIONS  Name of Medication? Lidocaine-Hydrocortisone Ace 3-0.5 % CREA  Patient-reported dosage and instructions? As needed  How many days do you have left? 0  Preferred Pharmacy? [id number]  Pharmacy phone number (if available)? [phone]  ---------------------------------------------------------------------------  --------------  CALL BACK INFO  What is the best way for the office to contact you? OK to leave message on   voicemail  Preferred Call Back Phone Number? [phone]  ---------------------------------------------------------------------------  --------------  SCRIPT ANSWERS  Relationship to Patient? Parent  Representative Name? Laz  Patient is under 18 and the Parent has custody? Yes  Additional information verified (besides Name and Date of Birth)? Phone   Number

## 2024-05-16 ENCOUNTER — APPOINTMENT (OUTPATIENT)
Facility: HOSPITAL | Age: 39
End: 2024-05-16
Payer: MEDICARE

## 2024-05-16 ENCOUNTER — HOSPITAL ENCOUNTER (EMERGENCY)
Facility: HOSPITAL | Age: 39
Discharge: HOME | End: 2024-05-16
Attending: EMERGENCY MEDICINE
Payer: MEDICARE

## 2024-05-16 VITALS
DIASTOLIC BLOOD PRESSURE: 74 MMHG | BODY MASS INDEX: 74.02 KG/M2 | OXYGEN SATURATION: 99 % | SYSTOLIC BLOOD PRESSURE: 128 MMHG | WEIGHT: 293 LBS | HEART RATE: 104 BPM | TEMPERATURE: 98.1 F | RESPIRATION RATE: 17 BRPM

## 2024-05-16 DIAGNOSIS — N76.2 VULVAR CELLULITIS: Primary | ICD-10-CM

## 2024-05-16 LAB
ALBUMIN SERPL-MCNC: 3.3 G/DL (ref 3.5–5)
ALBUMIN/GLOB SERPL: 1 (ref 1.1–2.2)
ALP SERPL-CCNC: 54 U/L (ref 45–117)
ALT SERPL-CCNC: 30 U/L (ref 12–78)
ANION GAP SERPL CALC-SCNC: 3 MMOL/L (ref 5–15)
AST SERPL-CCNC: 17 U/L (ref 15–37)
BASOPHILS # BLD: 0 K/UL (ref 0–0.1)
BASOPHILS NFR BLD: 0 % (ref 0–1)
BILIRUB SERPL-MCNC: 0.3 MG/DL (ref 0.2–1)
BUN SERPL-MCNC: 11 MG/DL (ref 6–20)
BUN/CREAT SERPL: 20 (ref 12–20)
CALCIUM SERPL-MCNC: 9.2 MG/DL (ref 8.5–10.1)
CHLORIDE SERPL-SCNC: 98 MMOL/L (ref 97–108)
CO2 SERPL-SCNC: 30 MMOL/L (ref 21–32)
CREAT SERPL-MCNC: 0.55 MG/DL (ref 0.55–1.02)
DIFFERENTIAL METHOD BLD: NORMAL
EOSINOPHIL # BLD: 0.1 K/UL (ref 0–0.4)
EOSINOPHIL NFR BLD: 1 % (ref 0–7)
ERYTHROCYTE [DISTWIDTH] IN BLOOD BY AUTOMATED COUNT: 12.9 % (ref 11.5–14.5)
GLOBULIN SER CALC-MCNC: 3.3 G/DL (ref 2–4)
GLUCOSE BLD STRIP.AUTO-MCNC: 99 MG/DL (ref 65–117)
GLUCOSE SERPL-MCNC: 127 MG/DL (ref 65–100)
HCT VFR BLD AUTO: 37 % (ref 35–47)
HGB BLD-MCNC: 12.1 G/DL (ref 11.5–16)
IMM GRANULOCYTES # BLD AUTO: 0 K/UL (ref 0–0.04)
IMM GRANULOCYTES NFR BLD AUTO: 0 % (ref 0–0.5)
LACTATE SERPL-SCNC: 1 MMOL/L (ref 0.4–2)
LYMPHOCYTES # BLD: 2.3 K/UL (ref 0.8–3.5)
LYMPHOCYTES NFR BLD: 23 % (ref 12–49)
MCH RBC QN AUTO: 28.2 PG (ref 26–34)
MCHC RBC AUTO-ENTMCNC: 32.7 G/DL (ref 30–36.5)
MCV RBC AUTO: 86.2 FL (ref 80–99)
MONOCYTES # BLD: 0.8 K/UL (ref 0–1)
MONOCYTES NFR BLD: 8 % (ref 5–13)
NEUTS SEG # BLD: 7 K/UL (ref 1.8–8)
NEUTS SEG NFR BLD: 68 % (ref 32–75)
NRBC # BLD: 0 K/UL (ref 0–0.01)
NRBC BLD-RTO: 0 PER 100 WBC
PLATELET # BLD AUTO: 282 K/UL (ref 150–400)
PMV BLD AUTO: 9.2 FL (ref 8.9–12.9)
POTASSIUM SERPL-SCNC: 3.7 MMOL/L (ref 3.5–5.1)
PROT SERPL-MCNC: 6.6 G/DL (ref 6.4–8.2)
RBC # BLD AUTO: 4.29 M/UL (ref 3.8–5.2)
SERVICE CMNT-IMP: NORMAL
SODIUM SERPL-SCNC: 131 MMOL/L (ref 136–145)
WBC # BLD AUTO: 10.3 K/UL (ref 3.6–11)

## 2024-05-16 PROCEDURE — 87040 BLOOD CULTURE FOR BACTERIA: CPT

## 2024-05-16 PROCEDURE — 36415 COLL VENOUS BLD VENIPUNCTURE: CPT

## 2024-05-16 PROCEDURE — 85025 COMPLETE CBC W/AUTO DIFF WBC: CPT

## 2024-05-16 PROCEDURE — 82962 GLUCOSE BLOOD TEST: CPT

## 2024-05-16 PROCEDURE — 83605 ASSAY OF LACTIC ACID: CPT

## 2024-05-16 PROCEDURE — 74177 CT ABD & PELVIS W/CONTRAST: CPT

## 2024-05-16 PROCEDURE — 80053 COMPREHEN METABOLIC PANEL: CPT

## 2024-05-16 PROCEDURE — 6360000004 HC RX CONTRAST MEDICATION: Performed by: EMERGENCY MEDICINE

## 2024-05-16 PROCEDURE — 99285 EMERGENCY DEPT VISIT HI MDM: CPT

## 2024-05-16 RX ORDER — DOXYCYCLINE HYCLATE 100 MG
100 TABLET ORAL 2 TIMES DAILY
Qty: 14 TABLET | Refills: 0 | Status: SHIPPED | OUTPATIENT
Start: 2024-05-16 | End: 2024-05-23

## 2024-05-16 RX ADMIN — IOPAMIDOL 100 ML: 755 INJECTION, SOLUTION INTRAVENOUS at 11:39

## 2024-05-16 NOTE — ED NOTES
DC paperwork reviewed with pts PCG, mother, verbalized back understanding, IV x2 removed, Pt wheeled out via staff, no distress noted.

## 2024-05-16 NOTE — ED TRIAGE NOTES
Pt brought in by EMS from home for a possible wound in her right groin area from  her skin rubbing together per mother. Pt went to [facility] ER yesterday bc her PCP said her sodium was low at 122. They were unable to get an IV but got blood work done and it was 129 yesterday. They sent her home to follow up today for sodium level.     Pt has prader-willi syndrome and DM. Pt did not take insulin this morning.

## 2024-05-16 NOTE — ED PROVIDER NOTES
[facility] EMERGENCY DEPT  EMERGENCY DEPARTMENT ENCOUNTER      Pt Name: Huyen Ahumada  MRN: [id number]  Birthdate 1985  Date of evaluation: 5/16/2024  Provider: Tyrell Arteaga MD    CHIEF COMPLAINT     No chief complaint on file.        HISTORY OF PRESENT ILLNESS   (Location/Symptom, Timing/Onset, Context/Setting, Quality, Duration, Modifying Factors, Severity)  Note limiting factors.   39-year-old with a history of hypertension, diabetes, morbid obesity, chronic lymphedema, anxiety, congestive heart failure, sleep apnea.  She presents via EMS with complaints of a 1 day history of a painful right sided vulvar abscess.  She states that she noted some discomfort yesterday, and it became worse today.  She denies any fever, nausea, vomiting, fatigue.  She and EMS personnel report that she was seen at the [facility] ED yesterday for abnormal blood work that she reports was low sodium.  No family at bedside at present.  The patient is a limited historian.          Review of External Medical Records:     Nursing Notes were reviewed.    REVIEW OF SYSTEMS    (2-9 systems for level 4, 10 or more for level 5)     Review of Systems    Except as noted above the remainder of the review of systems was reviewed and negative.       PAST MEDICAL HISTORY     Past Medical History:   Diagnosis Date    Anxiety disorder     Severe    Cellulitis, abdominal wall 5/18/2018    Congestive heart failure, unspecified     Diabetes (HCC)     Hypertension     Ill-defined condition     lymphedema    Lymph edema     Open wound of abdominal wall with complication 5/23/2018    Sleep apnea     cpap    Varicose veins of left lower extremity with ulcer of unspecified site (CODE) (HCC) 7/28/2023         SURGICAL HISTORY       Past Surgical History:   Procedure Laterality Date    CHOLECYSTECTOMY  age 18    HEENT      TONSILLECTOMY AND ADENOIDECTOMY  age 7    WISDOM TOOTH EXTRACTION  10/1/13         CURRENT MEDICATIONS       Previous Medications

## 2024-05-17 ENCOUNTER — TELEPHONE (OUTPATIENT)
Age: 39
End: 2024-05-17

## 2024-05-17 ENCOUNTER — PATIENT MESSAGE (OUTPATIENT)
Age: 39
End: 2024-05-17

## 2024-05-17 DIAGNOSIS — K64.9 BLEEDING HEMORRHOIDS: Primary | ICD-10-CM

## 2024-05-17 DIAGNOSIS — K64.9 BLEEDING HEMORRHOIDS: ICD-10-CM

## 2024-05-17 RX ORDER — LIDOCAINE 30 MG/G
CREAM TOPICAL
Qty: 85 G | Refills: 1 | Status: SHIPPED | OUTPATIENT
Start: 2024-05-17 | End: 2024-05-17 | Stop reason: SDUPTHER

## 2024-05-17 RX ORDER — LIDOCAINE HYDROCHLORIDE AND HYDROCORTISONE ACETATE 30; 25 MG/G; MG/G
GEL RECTAL
Refills: 0 | OUTPATIENT
Start: 2024-05-17

## 2024-05-17 RX ORDER — LIDOCAINE 30 MG/G
CREAM TOPICAL
Qty: 85 G | Refills: 1 | Status: SHIPPED | OUTPATIENT
Start: 2024-05-17

## 2024-05-17 NOTE — TELEPHONE ENCOUNTER
From: Huyen Ahumada  To: Dr. Dary Desouza  Sent: 5/17/2024 10:09 AM EDT  Subject: Lidocaine 3-2,5 Hydrocortisone o.5 Rectal Cream Kit    Good Morning Dr. Desouza and Jo. [facility] said they would need a new prescription for the rectal cream kit because the kits they have read: Lidocaine 3 -2.5 instead of the old kits which read Lidocaine 3%. If it’s not too much to ask, could you order what they have in stock. Carline and I will really be grateful. Thanks    [facility] [phone].

## 2024-05-17 NOTE — TELEPHONE ENCOUNTER
Iban with [facility] called stating that the Pt does not want Lidocaine 3 % CREA  or Gel. She wants the Lidocaine Kit. If this can be sent over please advise.      [facility] #[id number] - [place], [place] - [address] - P [phone] - F [phone]

## 2024-05-19 LAB
BACTERIA SPEC CULT: NORMAL
BACTERIA SPEC CULT: NORMAL
SERVICE CMNT-IMP: NORMAL
SERVICE CMNT-IMP: NORMAL

## 2024-05-22 ENCOUNTER — PATIENT MESSAGE (OUTPATIENT)
Age: 39
End: 2024-05-22

## 2024-05-23 DIAGNOSIS — K64.9 BLEEDING HEMORRHOIDS: Primary | ICD-10-CM

## 2024-05-23 RX ORDER — LIDOCAINE HYDROCHLORIDE AND HYDROCORTISONE ACETATE 30; 25 MG/G; MG/G
GEL RECTAL
Qty: 1 KIT | Refills: 5 | Status: SHIPPED | OUTPATIENT
Start: 2024-05-23

## 2024-05-23 RX ORDER — LIDOCAINE HYDROCHLORIDE AND HYDROCORTISONE ACETATE 30; 25 MG/G; MG/G
GEL RECTAL
Qty: 1 KIT | Refills: 5 | Status: SHIPPED | OUTPATIENT
Start: 2024-05-23 | End: 2024-05-23

## 2024-05-23 NOTE — TELEPHONE ENCOUNTER
This writer spoke with Deirdre Myers with [facility]. Pharmacist submitted prescription as sent by physician, per pharmacist insurance will not cover medication with saving cards, resulted in a $53.00 out of packet cost. Per pharmacist prescription was filled, but patient's mother submitted prescription refill request for Hydrocortisone-Lidocaine 2.5%-3% kit. Pharmacist provided with writer with correct NDC to associate with prescription. Prescription pending and forwarded to provided with NDC number noted.

## 2024-05-23 NOTE — TELEPHONE ENCOUNTER
Hi Dr. Desouza,    I spoke with mom and she is needing the medication sent to [facility]- [address] [place] [address] [place] [address].

## 2024-05-24 ENCOUNTER — OFFICE VISIT (OUTPATIENT)
Age: 39
End: 2024-05-24
Payer: MEDICARE

## 2024-05-24 VITALS
HEART RATE: 90 BPM | HEIGHT: 55 IN | TEMPERATURE: 98.3 F | OXYGEN SATURATION: 99 % | RESPIRATION RATE: 18 BRPM | BODY MASS INDEX: 74.02 KG/M2

## 2024-05-24 DIAGNOSIS — L98.491 SKIN ULCER, LIMITED TO BREAKDOWN OF SKIN (HCC): ICD-10-CM

## 2024-05-24 DIAGNOSIS — N76.2 CELLULITIS OF LABIA MAJORA: Primary | ICD-10-CM

## 2024-05-24 PROCEDURE — G8417 CALC BMI ABV UP PARAM F/U: HCPCS | Performed by: FAMILY MEDICINE

## 2024-05-24 PROCEDURE — 1036F TOBACCO NON-USER: CPT | Performed by: FAMILY MEDICINE

## 2024-05-24 PROCEDURE — 99214 OFFICE O/P EST MOD 30 MIN: CPT | Performed by: FAMILY MEDICINE

## 2024-05-24 PROCEDURE — G8427 DOCREV CUR MEDS BY ELIG CLIN: HCPCS | Performed by: FAMILY MEDICINE

## 2024-05-24 RX ORDER — CEPHALEXIN 500 MG/1
500 CAPSULE ORAL 4 TIMES DAILY
Qty: 20 CAPSULE | Refills: 0 | Status: SHIPPED | OUTPATIENT
Start: 2024-05-24 | End: 2024-05-29

## 2024-05-24 NOTE — PROGRESS NOTES
History of Present Illness:     Chief Complaint   Patient presents with    Emergency Department Follow Up     DX with [facility] between vagina and leg, Prescribed Doxycycline, completed antibiotic regimen   Per mother the day prior evaluated at [facility] ER due to low sodium levels per Endocrinologist        Huyen Ahumada is a 39 y.o. female   History provided by mother, her caregiver, due to underlying intellectual disability    ED follow up  Evaluated for vulvar cellulitis  Continues to have pain despite completing Doxycycline       PMH (REVIEWED):  Past Medical History:   Diagnosis Date    Anxiety disorder     Severe    Cellulitis, abdominal wall 5/18/2018    Congestive heart failure, unspecified     Diabetes (HCC)     Hypertension     Ill-defined condition     lymphedema    Lymph edema     Open wound of abdominal wall with complication 5/23/2018    Sleep apnea     cpap    Varicose veins of left lower extremity with ulcer of unspecified site (CODE) ([facility]) 7/28/2023       Current Medications/Allergies (REVIEWED):     Current Outpatient Medications on File Prior to Visit   Medication Sig Dispense Refill    Lidocaine-Hydrocortisone Ace 3-2.5 % KIT NDC #[id number] 1 kit 5    lisinopril (PRINIVIL;ZESTRIL) 10 MG tablet Take 1 tablet by mouth daily      Multiple Vitamin (MULTIVITAMIN ADULT PO) Take 1 tablet by mouth daily.      mupirocin (BACTROBAN) 2 % ointment APPLY TOPICALLY TO THE AFFECTED AREA THREE TIMES DAILY FOR 5 DAYS 22 g 1    Insulin Pen Needle (TECHLITE PEN NEEDLES) 31G X 8 MM MISC Inject 1 each into the skin daily 100 each 5    pantoprazole (PROTONIX) 40 MG tablet TAKE 1 TABLET BY MOUTH DAILY 90 tablet 3    vitamin E 400 UNIT capsule Take 400 Units by mouth daily.      Vitamin A Palmitate 3 MG (75060 UT) TABS Take 1 tablet by mouth daily.      ammonium lactate (AMLACTIN) 12 % cream APPLY TOPICALLY AS NEEDED FOR DRY SKIN 560 g 5    nystatin-triamcinolone (MYCOLOG II) 742929-4.1

## 2024-05-24 NOTE — PROGRESS NOTES
Room 25     Identified pt with two pt identifiers(name and ). Reviewed record in preparation for visit and have obtained necessary documentation.    Chief Complaint   Patient presents with    Emergency Department Follow Up     DX with [facility] between vagina and leg, Prescribed Doxycycline, completed antibiotic regimen   Per mother the day prior evaluated at [facility] ER due to low sodium levels per Endocrinologist         Health Maintenance Due   Topic    Diabetic Alb to Cr ratio (uACR) test     Cervical cancer screen     Lipids     Diabetic foot exam     Diabetic retinal exam     COVID-19 Vaccine (3 - 2023-24 season)    Hepatitis B vaccine (2 of 2 - CpG 2-dose series)    Annual Wellness Visit (Medicare)        Vitals:    24 1357   Pulse: 90   Resp: 18   Temp: 98.3 °F (36.8 °C)   TempSrc: Oral   SpO2: 99%   Height: 1.397 m (4' 7\")         \"Have you been to the ER, urgent care clinic since your last visit?  Hospitalized since your last visit?\"    NO    “Have you seen or consulted any other health care providers outside of [facility] since your last visit?”    NO     “Have you had a pap smear?”    NO    No cervical cancer screening on file       Click Here for Release of Records Request     This patient is accompanied in the office by her mother.  I have received verbal consent from Huyen Ahumada to discuss any/all medical information while they are present in the room.

## 2024-05-28 ENCOUNTER — TELEPHONE (OUTPATIENT)
Age: 39
End: 2024-05-28

## 2024-05-28 DIAGNOSIS — L98.491 SKIN ULCER, LIMITED TO BREAKDOWN OF SKIN (HCC): ICD-10-CM

## 2024-05-28 DIAGNOSIS — N76.2 CELLULITIS OF LABIA MAJORA: Primary | ICD-10-CM

## 2024-05-28 NOTE — TELEPHONE ENCOUNTER
Medina from [facility] called stating that they don't have the Mupirocin-Lidocaine. She stated that they could do the Mupirocin by itself or they have Lidocaine 5% ointment or Lidocaine 3% cream. Would like for new prescription to be sent in at your earliest convenience.    Thanks!

## 2024-05-29 ENCOUNTER — OFFICE VISIT (OUTPATIENT)
Age: 39
End: 2024-05-29
Payer: MEDICARE

## 2024-05-29 VITALS
HEIGHT: 55 IN | OXYGEN SATURATION: 93 % | RESPIRATION RATE: 18 BRPM | HEART RATE: 96 BPM | WEIGHT: 293 LBS | BODY MASS INDEX: 67.81 KG/M2

## 2024-05-29 DIAGNOSIS — Z23 ENCOUNTER FOR IMMUNIZATION: ICD-10-CM

## 2024-05-29 DIAGNOSIS — E87.1 HYPONATREMIA: ICD-10-CM

## 2024-05-29 DIAGNOSIS — Z53.20 CERVICAL CANCER SCREENING DECLINED: ICD-10-CM

## 2024-05-29 DIAGNOSIS — N76.2 CELLULITIS OF LABIA MAJORA: ICD-10-CM

## 2024-05-29 DIAGNOSIS — Z71.89 ACP (ADVANCE CARE PLANNING): ICD-10-CM

## 2024-05-29 DIAGNOSIS — Z00.00 MEDICARE ANNUAL WELLNESS VISIT, SUBSEQUENT: Primary | ICD-10-CM

## 2024-05-29 PROCEDURE — 90739 HEPB VACC 2/4 DOSE ADULT IM: CPT | Performed by: FAMILY MEDICINE

## 2024-05-29 PROCEDURE — PBSHW HEP B, HEPLISAV-B, (AGE 18 YRS+), IM, 0.5ML, 2-DOSE: Performed by: FAMILY MEDICINE

## 2024-05-29 PROCEDURE — G0439 PPPS, SUBSEQ VISIT: HCPCS | Performed by: FAMILY MEDICINE

## 2024-05-29 RX ORDER — LANCETS 28 GAUGE
EACH MISCELLANEOUS
COMMUNITY
Start: 2024-05-28

## 2024-05-29 RX ORDER — TIRZEPATIDE 5 MG/.5ML
5 INJECTION, SOLUTION SUBCUTANEOUS WEEKLY
COMMUNITY

## 2024-05-29 ASSESSMENT — PATIENT HEALTH QUESTIONNAIRE - PHQ9
SUM OF ALL RESPONSES TO PHQ QUESTIONS 1-9: 0
1. LITTLE INTEREST OR PLEASURE IN DOING THINGS: NOT AT ALL
SUM OF ALL RESPONSES TO PHQ QUESTIONS 1-9: 0
SUM OF ALL RESPONSES TO PHQ9 QUESTIONS 1 & 2: 0
SUM OF ALL RESPONSES TO PHQ QUESTIONS 1-9: 0
2. FEELING DOWN, DEPRESSED OR HOPELESS: NOT AT ALL
SUM OF ALL RESPONSES TO PHQ QUESTIONS 1-9: 0

## 2024-05-29 ASSESSMENT — LIFESTYLE VARIABLES
HOW MANY STANDARD DRINKS CONTAINING ALCOHOL DO YOU HAVE ON A TYPICAL DAY: PATIENT DOES NOT DRINK
HOW OFTEN DO YOU HAVE A DRINK CONTAINING ALCOHOL: NEVER

## 2024-05-29 NOTE — PROGRESS NOTES
Room 16    Identified pt with two pt identifiers(name and ). Reviewed record in preparation for visit and have obtained necessary documentation.    Chief Complaint   Patient presents with    Referral Follow Up - Endocrinology     Discuss Mounjaro, Started medication yesterday experienced abdomen pain and jaw pain    this morning    Medicare AWV        Health Maintenance Due   Topic    Diabetic Alb to Cr ratio (uACR) test     Cervical cancer screen     Lipids     Diabetic foot exam     Diabetic retinal exam     COVID-19 Vaccine (3 - 2023-24 season)    Hepatitis B vaccine (2 of 2 - CpG 2-dose series)    Annual Wellness Visit (Medicare)        Vitals:    24 1445   Pulse: 96   Resp: 18   SpO2: 93%   Weight: (!) 140.1 kg (308 lb 12.8 oz)   Height: 1.397 m (4' 7\")         \"Have you been to the ER, urgent care clinic since your last visit?  Hospitalized since your last visit?\"    NO    “Have you seen or consulted any other health care providers outside of [facility] since your last visit?”    NO     “Have you had a pap smear?”    NO    No cervical cancer screening on file       Click Here for Release of Records Request     This patient is accompanied in the office by her mother.  I have received verbal consent from Huyen Ahumada to discuss any/all medical information while they are present in the room.    
SOLOSTAR) 100 UNIT/ML injection pen Inject 80 Units into the skin 2 times daily  Patient not taking: Reported on 5/29/2024  Automatic Reconciliation, Ar       CareTeam (Including outside providers/suppliers regularly involved in providing care):   Patient Care Team:  Dary Desouza MD as PCP - General  Dary Desouza MD as PCP - [facility]  John Kumar MD as Physician  Ary Tinoco MD as Consulting Physician  Loyd Salas Jr., MD as Consulting Physician     Reviewed and updated this visit:  Tobacco  Allergies  Meds  Med Hx  Surg Hx  Soc Hx  Fam Hx

## 2024-05-29 NOTE — ACP (ADVANCE CARE PLANNING)
Advance Care Planning     Advance Care Planning (ACP) Physician/NP/PA Conversation    Date of Conversation: 5/29/2024  Conducted with: Patient with Decision Making Capacity    Healthcare Decision Maker:      Primary Decision Maker: Arely Ahumada - Parent - [phone]    Secondary Decision Maker: Xochilt Rutherford - Other - [phone]    Supplemental (Other) Decision Maker: Onofre Ahumada - Parent - [phone]    Click here to complete Healthcare Decision Makers including selection of the Healthcare Decision Maker Relationship (ie \"Primary\")  Today we documented Decision Maker(s) consistent with ACP documents on file.    Care Preferences:    Hospitalization:  \"If your health worsens and it becomes clear that your chance of recovery is unlikely, what would be your preference regarding hospitalization?\"  The patient would prefer hospitalization.    Ventilation:  \"If you were unable to breath on your own and your chance of recovery was unlikely, what would be your preference about the use of a ventilator (breathing machine) if it was available to you?\"  The patient would desire the use of a ventilator.    Resuscitation:  \"In the event your heart stopped as a result of an underlying serious health condition, would you want attempts made to restart your heart, or would you prefer a natural death?\"  Yes, attempt to resuscitate.    treatment goals, ventilation preferences, and resuscitation preferences    Conversation Outcomes / Follow-Up Plan:  ACP complete - no further action today  Reviewed DNR/DNI and patient elects Full Code (Attempt Resuscitation)    Length of Voluntary ACP Conversation in minutes:  <16 minutes (Non-Billable)    Dary Desouza MD

## 2024-06-03 ENCOUNTER — TELEPHONE (OUTPATIENT)
Age: 39
End: 2024-06-03

## 2024-06-03 NOTE — TELEPHONE ENCOUNTER
Margo with [employer] called checking on a fax she sent over on 5-28-24 regarding orders for a wheel chair and bath chair.

## 2024-06-07 NOTE — TELEPHONE ENCOUNTER
Completed forms received and faxed to [facility] with confirmation of receipt.

## 2024-06-17 ENCOUNTER — PATIENT MESSAGE (OUTPATIENT)
Age: 39
End: 2024-06-17

## 2024-06-17 NOTE — TELEPHONE ENCOUNTER
From: Huyen Ahumada  To: Dr. Dary Desouza  Sent: 6/17/2024 9:36 AM EDT  Subject: Lidocaine ointmentusp 5%    Good Morning Dr. Desouza and Huyen Osorio needs help with a couple of her meds if you can help us with:    Free Style Lancets. It needs a diabetic Medicare form because it is billed through Medicare directly.    Albuterol sulfate inhalation solution0.083%. This med is also billed directly to Medicare and will need the diabetic Medicare form filled out.    Lidocaine ointment use 5%  Could you please send a prescription to [facility] so I can mix it with the mupirocin ointment. Medina at [facility] said they don’t make a med with both meds. I have used all of the packets you gave us    Medina at [facility] said she will send you the diabetic Medicare forms. Thank you so very much.    DeNA came out this weekend . I am still working on the wound on [place] and now have another wound on the other leg.    Thank you so very much.

## 2024-06-18 RX ORDER — LIDOCAINE 5% 5 G/100G
CREAM TOPICAL
Qty: 90 G | Refills: 2 | Status: SHIPPED | OUTPATIENT
Start: 2024-06-18

## 2024-07-08 ENCOUNTER — TELEPHONE (OUTPATIENT)
Age: 39
End: 2024-07-08

## 2024-07-08 NOTE — TELEPHONE ENCOUNTER
Called to inform, referral order has been faxed to [facility]. Provided the name and provider's contact information. You may proceed to schedule an appointment.    [facility]  [phone]

## 2024-07-26 ENCOUNTER — TELEPHONE (OUTPATIENT)
Age: 39
End: 2024-07-26

## 2024-09-25 ENCOUNTER — TELEMEDICINE (OUTPATIENT)
Age: 39
End: 2024-09-25
Payer: MEDICARE

## 2024-09-25 DIAGNOSIS — E11.29 TYPE 2 DIABETES MELLITUS WITH OTHER DIABETIC KIDNEY COMPLICATION (HCC): ICD-10-CM

## 2024-09-25 DIAGNOSIS — G47.33 OSA (OBSTRUCTIVE SLEEP APNEA): Primary | ICD-10-CM

## 2024-09-25 PROCEDURE — 1036F TOBACCO NON-USER: CPT | Performed by: NURSE PRACTITIONER

## 2024-09-25 PROCEDURE — 99213 OFFICE O/P EST LOW 20 MIN: CPT | Performed by: NURSE PRACTITIONER

## 2024-09-25 PROCEDURE — G8417 CALC BMI ABV UP PARAM F/U: HCPCS | Performed by: NURSE PRACTITIONER

## 2024-09-25 PROCEDURE — 2022F DILAT RTA XM EVC RTNOPTHY: CPT | Performed by: NURSE PRACTITIONER

## 2024-09-25 PROCEDURE — 3046F HEMOGLOBIN A1C LEVEL >9.0%: CPT | Performed by: NURSE PRACTITIONER

## 2024-09-25 PROCEDURE — G8427 DOCREV CUR MEDS BY ELIG CLIN: HCPCS | Performed by: NURSE PRACTITIONER

## 2024-09-25 ASSESSMENT — SLEEP AND FATIGUE QUESTIONNAIRES
HOW LIKELY ARE YOU TO NOD OFF OR FALL ASLEEP WHILE SITTING INACTIVE IN A PUBLIC PLACE: WOULD NEVER DOZE
HOW LIKELY ARE YOU TO NOD OFF OR FALL ASLEEP WHILE LYING DOWN TO REST IN THE AFTERNOON WHEN CIRCUMSTANCES PERMIT: WOULD NEVER DOZE
HOW LIKELY ARE YOU TO NOD OFF OR FALL ASLEEP IN A CAR, WHILE STOPPED FOR A FEW MINUTES IN TRAFFIC: WOULD NEVER DOZE
ESS TOTAL SCORE: 1
HOW LIKELY ARE YOU TO NOD OFF OR FALL ASLEEP WHILE WATCHING TV: WOULD NEVER DOZE
HOW LIKELY ARE YOU TO NOD OFF OR FALL ASLEEP WHILE SITTING QUIETLY AFTER LUNCH WITHOUT ALCOHOL: WOULD NEVER DOZE
HOW LIKELY ARE YOU TO NOD OFF OR FALL ASLEEP WHEN YOU ARE A PASSENGER IN A CAR FOR AN HOUR WITHOUT A BREAK: SLIGHT CHANCE OF DOZING
HOW LIKELY ARE YOU TO NOD OFF OR FALL ASLEEP WHILE SITTING AND READING: WOULD NEVER DOZE
HOW LIKELY ARE YOU TO NOD OFF OR FALL ASLEEP WHILE SITTING AND TALKING TO SOMEONE: WOULD NEVER DOZE

## 2024-09-26 ENCOUNTER — CLINICAL DOCUMENTATION (OUTPATIENT)
Age: 39
End: 2024-09-26

## 2024-10-23 ENCOUNTER — PATIENT MESSAGE (OUTPATIENT)
Age: 39
End: 2024-10-23

## 2024-10-24 RX ORDER — NYSTATIN 100000 U/G
CREAM TOPICAL
Qty: 180 G | Refills: 2 | Status: SHIPPED | OUTPATIENT
Start: 2024-10-24

## 2024-10-27 DIAGNOSIS — L85.3 DRY SKIN: ICD-10-CM

## 2024-10-28 RX ORDER — AMMONIUM LACTATE 12 G/100G
CREAM TOPICAL
Qty: 560 G | Refills: 5 | Status: SHIPPED | OUTPATIENT
Start: 2024-10-28

## 2024-10-28 NOTE — TELEPHONE ENCOUNTER
Medication Refill Request    Huyen Ahumada is requesting a refill of the following medication(s):   Requested Prescriptions     Pending Prescriptions Disp Refills    ammonium lactate (AMLACTIN) 12 % cream [Pharmacy Med Name: AMMONIUM LAC 12% CREAM 280GM] 560 g 5     Sig: APPLY TOPICALLY AS NEEDED FOR DRY SKIN        Listed PCP is Dary Desouza MD   Last provider to prescribe medication: Dr. Desouza   Last Date of Medication Prescribed: 03/15/2024   Date of Last Office Visit at [facility]: 05/29/2024     Future Appointment: No future appointments.    Please send refill to:    [facility][id number] [place], [place] - [address] - P [phone] - F [phone]  [address] [place], [place] [address] [facility] [address]  Phone: [phone] Fax: [phone]    [facility] [address] [place] [address] [id number] [place] [address] [phone] - F [phone]  [address] [place] [address]  Phone: [phone] Fax: [phone]    [facility] [address] [place] [address] [phone] - F [phone]  [address] [facility] [address]  Phone: [phone] Fax: [phone]    [facility] #[id number] - [place], [place] - [address] - P [phone] - F [phone]  [address] [facility] [address]  Phone: [phone] Fax: [phone]    ]

## 2024-11-15 ENCOUNTER — OFFICE VISIT (OUTPATIENT)
Age: 39
End: 2024-11-15
Payer: MEDICARE

## 2024-11-15 VITALS
WEIGHT: 293 LBS | TEMPERATURE: 97.3 F | BODY MASS INDEX: 67.81 KG/M2 | OXYGEN SATURATION: 95 % | RESPIRATION RATE: 16 BRPM | HEIGHT: 55 IN | HEART RATE: 99 BPM

## 2024-11-15 DIAGNOSIS — H57.89 DISCHARGE FROM EYE: Primary | ICD-10-CM

## 2024-11-15 PROCEDURE — 99213 OFFICE O/P EST LOW 20 MIN: CPT

## 2024-11-15 RX ORDER — ERYTHROMYCIN 5 MG/G
OINTMENT OPHTHALMIC
Qty: 3.5 G | Refills: 0 | Status: SHIPPED | OUTPATIENT
Start: 2024-11-15 | End: 2024-11-25

## 2024-11-15 NOTE — PATIENT INSTRUCTIONS
- if you have any eye pain hat persists, worsening vision, or persistent symptoms that do not improve with the use of the eye drops, please call the clinic or schedule an appointment with your eye doctor  - an eye ointment has been prescribed for use if symptoms are worsening or not improving over the next 3-5 days.

## 2024-11-15 NOTE — PROGRESS NOTES
[facility]  [address] [place] [address]   Office [phone], Fax [phone]      Chief Complaint:     Chief Complaint   Patient presents with    Conjunctivitis     Had crust in her eyes and red and painful irritation in her eyes just today.       SUBJECTIVE  Huyen Ahumada is an 39 y.o. female with h/o T2DM, HTN, CHF, Prader-Willi, CKD who presents for eye symptoms    #eye symptoms  - onset today, bilaterally  - eye crusting this morning with some redness and pain  - symptoms have since resolved  - denies fever, change in vision, headache, pain with eye movement, sore throat, cough or other symptoms at this time  - used OTC ointment to the eyelashes without relief      Allergies   Allergies   Allergen Reactions    Sulfa Antibiotics Other (See Comments)     Bactrim    Sulfamethoxazole     Byetta 10 Mcg Pen [Exenatide] Rash     Other reaction(s): SKIN RASH    Sulfamethoxazole-Trimethoprim Nausea And Vomiting     Other Reaction(s): GI distress         Medications   Current Outpatient Medications   Medication Sig    erythromycin (ROMYCIN) 5 MG/GM ophthalmic ointment Apply 0.5in to the lower eyelid four times daily for 5-7 days    ammonium lactate (AMLACTIN) 12 % cream APPLY TOPICALLY AS NEEDED FOR DRY SKIN    nystatin (MYCOSTATIN) 525330 UNIT/GM cream Apply topically 2 times daily.    Lidocaine 5 % CREA Apply topically as needed    Tirzepatide 12.5 MG/0.5ML SOAJ Inject 12.5 mg into the skin once a week    FreeStyle Lancets MISC check blood sugars DX: E11.65 in vitro 3 times a day for 90 days    Lidocaine-Hydrocortisone Ace 3-2.5 % KIT NDC #[id number]    Insulin Pen Needle (TECHLITE PEN NEEDLES) 31G X 8 MM MISC Inject 1 each into the skin daily    pantoprazole (PROTONIX) 40 MG tablet TAKE 1 TABLET BY MOUTH DAILY    lisinopril (PRINIVIL;ZESTRIL) 10 MG tablet Take 1 tablet by mouth daily    vitamin E 400 UNIT capsule Take 1 capsule by mouth daily    Multiple Vitamin

## 2024-11-15 NOTE — PROGRESS NOTES
Huyen Ahumada is a 39 y.o. female    Chief Complaint   Patient presents with    Conjunctivitis     Had crust in her eyes and red and painful irritation in her eyes just today.       \"Have you been to the ER, urgent care clinic since your last visit?  Hospitalized since your last visit?\"    NO    “Have you seen or consulted any other health care providers outside of [facility] since your last visit?”    NO        “Have you had a pap smear?”    NO.    No cervical cancer screening on file            There were no vitals filed for this visit.       No data to display              Health Maintenance Due   Topic Date Due    Cervical cancer screen  Never done    Lipids  11/15/2019    Flu vaccine (1) 08/01/2024    COVID-19 Vaccine (3 - 2023-24 season) 09/01/2024

## 2025-02-21 DIAGNOSIS — L30.4 INTERTRIGO: ICD-10-CM

## 2025-02-21 RX ORDER — NYSTATIN AND TRIAMCINOLONE ACETONIDE 100000; 1 [USP'U]/G; MG/G
CREAM TOPICAL
Qty: 240 G | Refills: 3 | Status: SHIPPED | OUTPATIENT
Start: 2025-02-21

## 2025-02-21 NOTE — TELEPHONE ENCOUNTER
Medication Refill Request    Huyen Ahumada is requesting a refill of the following medication(s):   Requested Prescriptions     Pending Prescriptions Disp Refills    nystatin-triamcinolone (MYCOLOG II) 942325-9.1 UNIT/GM-% cream [Pharmacy Med Name: NYSTATIN/TRIAMCINOLONE CREAM 15GM] 240 g 3     Sig: APPLY TOPICALLY TO AFFECTED AREA FOUR TIMES DAILY        Listed PCP is Dary Desouza MD   Last provider to prescribe medication: Deo    Last Date of Medication Prescribed: 03/12/2024  Date of Last Office Visit at [facility]: 11/15/2024  FUTURE APPOINTMENT: No future appointments.    Please send refill to:    [id number] [place], [place] - [address] - P [phone] - F [phone] [address] [facility] [address]  Phone: [phone] Fax: [phone]      Please review request and approve or deny with recommendations.

## 2025-03-06 DIAGNOSIS — L30.4 INTERTRIGO: ICD-10-CM

## 2025-03-07 RX ORDER — NYSTATIN AND TRIAMCINOLONE ACETONIDE 100000; 1 [USP'U]/G; MG/G
CREAM TOPICAL
Qty: 240 G | Refills: 3 | OUTPATIENT
Start: 2025-03-07

## 2025-03-27 LAB
ESTIMATED AVERAGE GLUCOSE: ABNORMAL
HBA1C MFR BLD: 6.3 %

## 2025-04-03 NOTE — TELEPHONE ENCOUNTER
Hi Dr. Desouza,    Received a fax from patient pharmacy requesting refills on:    B-D Pen NDL Mini 97SI9MG(3/16)PRPL    Please send to:    Pharmacy    [id number] - [place], [place] - [address] - P [phone] - F [phone] [address] [facility] [address]  Phone: [phone]  Fax: [phone]       Fax attached to this message.

## 2025-04-03 NOTE — TELEPHONE ENCOUNTER
Medication Refill Request    Huyen Ahumada is requesting a refill of the following medication(s):   Requested Prescriptions     Pending Prescriptions Disp Refills    Insulin Pen Needle 31G X 5 MM MISC 400 each 3     Sig: Use a directed to inject insulin/victoza as directed. DX Code E11.9 Type 2 diabetes mellitus with other diabetic kidney complication (HCC)        Listed PCP is Dary Desouza MD   Last provider to prescribe medication:   Last Date of Medication Prescribed: 10.19.23   Date of Last Office Visit at [facility]: 11.15.24   FUTURE APPOINTMENT:   Future Appointments   Date Time Provider Department Center   4/29/2025 10:40 AM Dary Desouza MD [facility] ECC DEP       Please send refill to:    [facility][id number] [place], [place] - [address] - P [phone] - F [phone]  [address] [address]  Phone: [phone] Fax: [phone]    [facility] [address] [place] [address] [facility] [phone] - F [phone]  [id number] [place] [address]  Phone: [phone] Fax: [phone]    [facility] [address] [place] [address] -  [phone] - F [phone]  [address] [facility] [address]  Phone: [phone] Fax: [phone]    [facility] #[id number] - [place], [place] - [address] - P [phone] - F [phone]  [address] [place] [address]  Phone: [phone] Fax: [phone]      Please review request and approve or deny with recommendations.

## 2025-04-25 DIAGNOSIS — L30.4 INTERTRIGO: ICD-10-CM

## 2025-04-25 RX ORDER — PANTOPRAZOLE SODIUM 40 MG/1
40 TABLET, DELAYED RELEASE ORAL DAILY
Qty: 90 TABLET | Refills: 3 | Status: SHIPPED | OUTPATIENT
Start: 2025-04-25

## 2025-04-25 RX ORDER — NYSTATIN AND TRIAMCINOLONE ACETONIDE 100000; 1 [USP'U]/G; MG/G
CREAM TOPICAL
Qty: 60 G | Refills: 0 | Status: SHIPPED | OUTPATIENT
Start: 2025-04-25

## 2025-04-25 NOTE — TELEPHONE ENCOUNTER
Medication Refill Request    Huyen Ahumada is requesting a refill of the following medication(s):   Requested Prescriptions     Pending Prescriptions Disp Refills    nystatin-triamcinolone (MYCOLOG II) 442219-4.1 UNIT/GM-% cream [Pharmacy Med Name: Nystatin-Triamcinolone 433905-7.1 UNIT/GM-% External Cream] 60 g 0     Sig: APPLY  CREAM TOPICALLY TO AFFECTED AREA 4 TIMES DAILY        Listed PCP is Dary Desouza MD   Last provider to prescribe medication: Deo  Last Date of Medication Prescribed:  02/21/2025  Date of Last Office Visit at [facility]:  11/15/2024  FUTURE APPOINTMENT:   Future Appointments   Date Time Provider Department Center   4/29/2025 10:40 AM Dary Desouza MD [facility] DEP       Please send refill to:  [facility] [address] [place] [address] [facility] [phone] - F [phone]     Please review request and approve or deny with recommendations.

## 2025-04-25 NOTE — TELEPHONE ENCOUNTER
Medication Refill Request    Huyen Ahumada is requesting a refill of the following medication(s):   Requested Prescriptions     Pending Prescriptions Disp Refills    pantoprazole (PROTONIX) 40 MG tablet [Pharmacy Med Name: PANTOPRAZOLE 40MG TABLETS] 90 tablet 3     Sig: TAKE 1 TABLET BY MOUTH DAILY        Listed PCP is Dary Desouza MD   Last provider to prescribe medication: Deo  Last Date of Medication Prescribed:  04/08/2024  Date of Last Office Visit at [facility]:  11/15/2024  FUTURE APPOINTMENT:   Future Appointments   Date Time Provider Department Center   4/29/2025 10:40 AM Dary Desouza MD [facility] DEP       Please send refill to:    [id number] [place], [place] - [address] - P [phone] - F [phone] [address] [facility] [address]  Phone: [phone] Fax: [phone]      Please review request and approve or deny with recommendations.      complains of pain/discomfort

## 2025-04-29 ENCOUNTER — OFFICE VISIT (OUTPATIENT)
Age: 40
End: 2025-04-29
Payer: MEDICARE

## 2025-04-29 VITALS
TEMPERATURE: 98 F | HEART RATE: 86 BPM | RESPIRATION RATE: 16 BRPM | OXYGEN SATURATION: 95 % | HEIGHT: 55 IN | BODY MASS INDEX: 69.96 KG/M2

## 2025-04-29 DIAGNOSIS — R26.89 ABNORMALITY OF GAIT DUE TO IMPAIRMENT OF BALANCE: ICD-10-CM

## 2025-04-29 DIAGNOSIS — H92.02 LEFT EAR PAIN: ICD-10-CM

## 2025-04-29 DIAGNOSIS — L30.4 INTERTRIGO: Primary | ICD-10-CM

## 2025-04-29 DIAGNOSIS — E11.29 TYPE 2 DIABETES MELLITUS WITH OTHER DIABETIC KIDNEY COMPLICATION (HCC): ICD-10-CM

## 2025-04-29 DIAGNOSIS — I50.9 CONGESTIVE HEART FAILURE, UNSPECIFIED HF CHRONICITY, UNSPECIFIED HEART FAILURE TYPE (HCC): ICD-10-CM

## 2025-04-29 PROCEDURE — 2022F DILAT RTA XM EVC RTNOPTHY: CPT | Performed by: FAMILY MEDICINE

## 2025-04-29 PROCEDURE — 99213 OFFICE O/P EST LOW 20 MIN: CPT | Performed by: FAMILY MEDICINE

## 2025-04-29 PROCEDURE — G8427 DOCREV CUR MEDS BY ELIG CLIN: HCPCS | Performed by: FAMILY MEDICINE

## 2025-04-29 PROCEDURE — 1036F TOBACCO NON-USER: CPT | Performed by: FAMILY MEDICINE

## 2025-04-29 PROCEDURE — 3046F HEMOGLOBIN A1C LEVEL >9.0%: CPT | Performed by: FAMILY MEDICINE

## 2025-04-29 PROCEDURE — G8417 CALC BMI ABV UP PARAM F/U: HCPCS | Performed by: FAMILY MEDICINE

## 2025-04-29 PROCEDURE — G2211 COMPLEX E/M VISIT ADD ON: HCPCS | Performed by: FAMILY MEDICINE

## 2025-04-29 RX ORDER — NYSTATIN AND TRIAMCINOLONE ACETONIDE 100000; 1 [USP'U]/G; MG/G
OINTMENT TOPICAL
COMMUNITY
Start: 2025-03-06

## 2025-04-29 RX ORDER — TIRZEPATIDE 15 MG/.5ML
15 INJECTION, SOLUTION SUBCUTANEOUS
COMMUNITY
Start: 2025-03-21

## 2025-04-29 RX ORDER — NYSTATIN AND TRIAMCINOLONE ACETONIDE 100000; 1 [USP'U]/G; MG/G
CREAM TOPICAL
Qty: 180 G | Refills: 3 | Status: SHIPPED | OUTPATIENT
Start: 2025-04-29

## 2025-04-29 RX ORDER — BLOOD-GLUCOSE METER
KIT MISCELLANEOUS
COMMUNITY

## 2025-04-29 RX ORDER — BUTALBITAL, ACETAMINOPHEN AND CAFFEINE 50; 325; 40 MG/1; MG/1; MG/1
1 TABLET ORAL
COMMUNITY
Start: 2024-11-10

## 2025-04-29 RX ORDER — MUPIROCIN 20 MG/G
OINTMENT TOPICAL
COMMUNITY
Start: 2025-01-16

## 2025-04-29 RX ORDER — ERYTHROMYCIN 5 MG/G
OINTMENT OPHTHALMIC
COMMUNITY

## 2025-04-29 SDOH — ECONOMIC STABILITY: FOOD INSECURITY: WITHIN THE PAST 12 MONTHS, YOU WORRIED THAT YOUR FOOD WOULD RUN OUT BEFORE YOU GOT MONEY TO BUY MORE.: NEVER TRUE

## 2025-04-29 SDOH — ECONOMIC STABILITY: FOOD INSECURITY: WITHIN THE PAST 12 MONTHS, THE FOOD YOU BOUGHT JUST DIDN'T LAST AND YOU DIDN'T HAVE MONEY TO GET MORE.: NEVER TRUE

## 2025-04-29 ASSESSMENT — PATIENT HEALTH QUESTIONNAIRE - PHQ9
1. LITTLE INTEREST OR PLEASURE IN DOING THINGS: NOT AT ALL
2. FEELING DOWN, DEPRESSED OR HOPELESS: NOT AT ALL
SUM OF ALL RESPONSES TO PHQ QUESTIONS 1-9: 0
SUM OF ALL RESPONSES TO PHQ QUESTIONS 1-9: 0

## 2025-04-29 ASSESSMENT — ENCOUNTER SYMPTOMS: SHORTNESS OF BREATH: 0

## 2025-04-29 NOTE — PROGRESS NOTES
Huyen Ahumada is a 40 y.o. female      Chief Complaint   Patient presents with    Ear Pain     Left  - feeling a little dizzy  - refill for nystatin-triamcinolone   - PT order for fall risk     Shoulder Injury     Partially dislocated right shoulder      Cellulitis     Right leg       \"Have you been to the ER, urgent care clinic since your last visit?  Hospitalized since your last visit?\"    NO    “Have you seen or consulted any other health care providers outside of [facility] since your last visit?”    NO       Have you had a mammogram?”   NO    No breast cancer screening on file      “Have you had a pap smear?”    NO    No cervical cancer screening on file            Vitals:    04/29/25 1119   Pulse: 86   Resp: 16   Temp: 98 °F (36.7 °C)   TempSrc: Temporal   SpO2: 95%   Height: 1.397 m (4' 7\")            Health Maintenance Due   Topic Date Due    Cervical cancer screen  Never done    Lipids  11/15/2019    COVID-19 Vaccine (3 - 2024-25 season) 09/01/2024    Breast cancer screen  Never done    A1C test (Diabetic or Prediabetic)  05/14/2025    GFR test (Diabetes, CKD 3-4, OR last GFR 15-59)  05/16/2025    Depression Screen  05/29/2025         Medication Reconciliation completed, changes noted.  Please  Update medication list.   
      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  Medication risks, benefits, costs, interactions, and alternatives were discussed as indicated.  I advised her to contact the office if her condition worsens, changes or fails to improve as anticipated. She expressed understanding with the diagnosis(es) and plan.

## 2025-05-02 ENCOUNTER — PATIENT MESSAGE (OUTPATIENT)
Age: 40
End: 2025-05-02

## 2025-05-02 DIAGNOSIS — E66.01 SEVERE OBESITY (BMI >= 40) (HCC): ICD-10-CM

## 2025-05-02 DIAGNOSIS — I89.0 LYMPHEDEMA: Primary | ICD-10-CM

## 2025-05-19 NOTE — PATIENT INSTRUCTIONS
Eye Doctor Referral:     [facility]  Karthikeyan Aguilar [address] [facility], [address]  Phone: [phone]      [employer]  (Numerous locations; see web site)  [facility], [address] [facility] [address]  [facility], [id number] [place] [address] [phone]. [employer]
Yes

## 2025-07-22 DIAGNOSIS — L30.4 INTERTRIGO: ICD-10-CM

## 2025-07-23 RX ORDER — NYSTATIN AND TRIAMCINOLONE ACETONIDE 100000; 1 [USP'U]/G; MG/G
CREAM TOPICAL
Qty: 60 G | Refills: 5 | Status: SHIPPED | OUTPATIENT
Start: 2025-07-23

## 2025-07-23 NOTE — TELEPHONE ENCOUNTER
Medication Refill Request    Huyen Ahumada is requesting a refill of the following medication(s):   Requested Prescriptions     Pending Prescriptions Disp Refills    nystatin-triamcinolone (MYCOLOG II) 151916-4.1 UNIT/GM-% cream [Pharmacy Med Name: Nystatin-Triamcinolone 559296-6.1 UNIT/GM-% External Cream] 60 g 0     Sig: APPLY  CREAM TOPICALLY TO AFFECTED AREA 4 TIMES DAILY        Listed PCP is Dary Desouza MD   Last provider to prescribe medication: Dary Desouza MD  Last Date of Medication Prescribed: 04.29.25   Date of Last Office Visit at [facility]: 04.29.25   FUTURE APPOINTMENT: No future appointments.    Please send refill to:    [facility][id number] [place], [place] - [address] - P [phone] - F [phone] [address] [facility] [address]  Phone: [phone] Fax: [phone]    [facility] [address] [place] [address] [facility] [phone] - F [phone]  [address] [place] [address]  Phone: [phone] Fax: [phone]    [facility] [address] [phone] - F [phone]  [address] [facility] [address]  Phone: [phone] Fax: [phone]    [facility] #[id number] - [place], [place] - [address] [facility] - P [phone] - F [phone]  [address] [place] [address]  Phone: [phone] Fax: [phone]      Please review request and approve or deny with recommendations.

## 2025-08-13 ENCOUNTER — PATIENT MESSAGE (OUTPATIENT)
Age: 40
End: 2025-08-13

## 2025-08-13 DIAGNOSIS — I89.0 LYMPHEDEMA: ICD-10-CM

## 2025-08-13 DIAGNOSIS — Q87.11 PRADER-WILLI SYNDROME: Primary | ICD-10-CM

## 2025-08-14 ENCOUNTER — OFFICE VISIT (OUTPATIENT)
Age: 40
End: 2025-08-14
Payer: MEDICARE

## 2025-08-14 VITALS
BODY MASS INDEX: 65.95 KG/M2 | HEIGHT: 55 IN | OXYGEN SATURATION: 94 % | RESPIRATION RATE: 18 BRPM | HEART RATE: 93 BPM | TEMPERATURE: 97.2 F | WEIGHT: 285 LBS

## 2025-08-14 DIAGNOSIS — F79 INTELLECTUAL DISABILITY: ICD-10-CM

## 2025-08-14 DIAGNOSIS — Q87.11 PRADER-WILLI SYNDROME: ICD-10-CM

## 2025-08-14 DIAGNOSIS — L08.9 INFECTED OPEN WOUND: Primary | ICD-10-CM

## 2025-08-14 DIAGNOSIS — L03.116 CELLULITIS OF LEFT THIGH: ICD-10-CM

## 2025-08-14 DIAGNOSIS — T14.8XXA INFECTED OPEN WOUND: Primary | ICD-10-CM

## 2025-08-14 DIAGNOSIS — E66.01 SEVERE OBESITY (BMI >= 40) (HCC): ICD-10-CM

## 2025-08-14 PROCEDURE — G8427 DOCREV CUR MEDS BY ELIG CLIN: HCPCS | Performed by: FAMILY MEDICINE

## 2025-08-14 PROCEDURE — G2211 COMPLEX E/M VISIT ADD ON: HCPCS | Performed by: FAMILY MEDICINE

## 2025-08-14 PROCEDURE — 99214 OFFICE O/P EST MOD 30 MIN: CPT | Performed by: FAMILY MEDICINE

## 2025-08-14 PROCEDURE — 1036F TOBACCO NON-USER: CPT | Performed by: FAMILY MEDICINE

## 2025-08-14 PROCEDURE — G8417 CALC BMI ABV UP PARAM F/U: HCPCS | Performed by: FAMILY MEDICINE

## 2025-08-14 RX ORDER — TRAZODONE HYDROCHLORIDE 50 MG/1
50 TABLET ORAL NIGHTLY
COMMUNITY
Start: 2025-07-21

## 2025-08-14 RX ORDER — CEPHALEXIN 500 MG/1
500 CAPSULE ORAL 4 TIMES DAILY
Qty: 20 CAPSULE | Refills: 0 | Status: SHIPPED | OUTPATIENT
Start: 2025-08-14 | End: 2025-08-19

## 2025-08-14 RX ORDER — NAFTIFINE HYDROCHLORIDE 10 MG/G
CREAM TOPICAL
COMMUNITY

## 2025-09-03 DIAGNOSIS — Z23 ENCOUNTER FOR IMMUNIZATION: Primary | ICD-10-CM
